# Patient Record
Sex: FEMALE | Race: WHITE | NOT HISPANIC OR LATINO | Employment: OTHER | URBAN - METROPOLITAN AREA
[De-identification: names, ages, dates, MRNs, and addresses within clinical notes are randomized per-mention and may not be internally consistent; named-entity substitution may affect disease eponyms.]

---

## 2018-03-01 ENCOUNTER — AMBULATORY CONVERSION ENCOUNTER (OUTPATIENT)
Dept: CARDIOLOGY | Facility: CLINIC | Age: 63
End: 2018-03-01

## 2018-03-14 ENCOUNTER — TRANSCRIBE ORDERS (OUTPATIENT)
Dept: SCHEDULING | Age: 63
End: 2018-03-14

## 2018-03-14 DIAGNOSIS — E11.9 DIABETES MELLITUS WITHOUT COMPLICATION (CMS/HCC): Primary | ICD-10-CM

## 2018-04-02 ENCOUNTER — HOSPITAL ENCOUNTER (OUTPATIENT)
Dept: RADIOLOGY | Facility: HOSPITAL | Age: 63
Discharge: HOME | End: 2018-04-02
Attending: INTERNAL MEDICINE
Payer: COMMERCIAL

## 2018-04-02 DIAGNOSIS — E11.9 DIABETES MELLITUS WITHOUT COMPLICATION (CMS/HCC): ICD-10-CM

## 2018-04-02 PROCEDURE — 75571 CT HRT W/O DYE W/CA TEST: CPT

## 2018-04-09 ENCOUNTER — TELEPHONE (OUTPATIENT)
Dept: CARDIOLOGY | Facility: CLINIC | Age: 63
End: 2018-04-09

## 2018-04-09 ENCOUNTER — TELEPHONE (OUTPATIENT)
Dept: SCHEDULING | Facility: CLINIC | Age: 63
End: 2018-04-09

## 2018-04-09 NOTE — TELEPHONE ENCOUNTER
Dr. Daniel Pt.  Pt returning Dr. Middleton regarding calcium score results. Pt can be reached at number provided.

## 2018-04-09 NOTE — TELEPHONE ENCOUNTER
Spoke with pt. Calcium score reviewed, elevated evidence of definite CAD. On asa/statin. Continue. Discussed symptoms, call with any concerns. Lifestyle modification.

## 2018-06-11 PROBLEM — M19.90 ARTHRITIS: Status: ACTIVE | Noted: 2018-06-11

## 2018-06-12 ENCOUNTER — OFFICE VISIT (OUTPATIENT)
Dept: FAMILY MEDICINE | Facility: CLINIC | Age: 63
End: 2018-06-12
Attending: FAMILY MEDICINE
Payer: COMMERCIAL

## 2018-06-12 VITALS
OXYGEN SATURATION: 98 % | TEMPERATURE: 97.1 F | HEART RATE: 79 BPM | BODY MASS INDEX: 38.87 KG/M2 | HEIGHT: 60 IN | WEIGHT: 198 LBS

## 2018-06-12 DIAGNOSIS — F41.9 ANXIETY: ICD-10-CM

## 2018-06-12 DIAGNOSIS — E11.69 DM TYPE 2 WITH DIABETIC MIXED HYPERLIPIDEMIA (CMS/HCC)  (CMS/HCC): Primary | ICD-10-CM

## 2018-06-12 DIAGNOSIS — E78.2 DM TYPE 2 WITH DIABETIC MIXED HYPERLIPIDEMIA (CMS/HCC)  (CMS/HCC): Primary | ICD-10-CM

## 2018-06-12 DIAGNOSIS — I10 ESSENTIAL HYPERTENSION: ICD-10-CM

## 2018-06-12 PROCEDURE — 36415 COLL VENOUS BLD VENIPUNCTURE: CPT | Performed by: FAMILY MEDICINE

## 2018-06-12 PROCEDURE — 99213 OFFICE O/P EST LOW 20 MIN: CPT | Mod: 25 | Performed by: FAMILY MEDICINE

## 2018-06-12 RX ORDER — RAMIPRIL 2.5 MG/1
2.5 CAPSULE ORAL DAILY
Qty: 90 CAPSULE | Refills: 1 | Status: SHIPPED | OUTPATIENT
Start: 2018-06-12 | End: 2018-12-18 | Stop reason: SDUPTHER

## 2018-06-12 RX ORDER — NYSTATIN AND TRIAMCINOLONE ACETONIDE 100000; 1 [USP'U]/G; MG/G
1 CREAM TOPICAL 3 TIMES DAILY
COMMUNITY
Start: 2017-06-06 | End: 2018-06-12 | Stop reason: SDUPTHER

## 2018-06-12 RX ORDER — RAMIPRIL 2.5 MG/1
1 CAPSULE ORAL DAILY
COMMUNITY
Start: 2017-12-12 | End: 2018-06-12 | Stop reason: SDUPTHER

## 2018-06-12 RX ORDER — NYSTATIN AND TRIAMCINOLONE ACETONIDE 100000; 1 [USP'U]/G; MG/G
1 CREAM TOPICAL 3 TIMES DAILY
Qty: 30 G | Refills: 1 | Status: SHIPPED | OUTPATIENT
Start: 2018-06-12 | End: 2019-06-24

## 2018-06-12 RX ORDER — ATORVASTATIN CALCIUM 40 MG/1
40 TABLET, FILM COATED ORAL DAILY
Qty: 90 TABLET | Refills: 1 | Status: SHIPPED | OUTPATIENT
Start: 2018-06-12 | End: 2018-12-18 | Stop reason: SDUPTHER

## 2018-06-12 RX ORDER — TRIAMTERENE AND HYDROCHLOROTHIAZIDE 37.5; 25 MG/1; MG/1
1 CAPSULE ORAL DAILY
COMMUNITY
Start: 2017-12-12 | End: 2018-06-12 | Stop reason: SDUPTHER

## 2018-06-12 RX ORDER — ATORVASTATIN CALCIUM 40 MG/1
1 TABLET, FILM COATED ORAL DAILY
COMMUNITY
Start: 2017-12-12 | End: 2018-06-12 | Stop reason: SDUPTHER

## 2018-06-12 RX ORDER — LORAZEPAM 1 MG/1
1 TABLET ORAL 3 TIMES DAILY
COMMUNITY
Start: 2017-12-12 | End: 2024-07-22 | Stop reason: SDUPTHER

## 2018-06-12 RX ORDER — TRIAMTERENE AND HYDROCHLOROTHIAZIDE 37.5; 25 MG/1; MG/1
1 CAPSULE ORAL DAILY
Qty: 90 CAPSULE | Refills: 1 | Status: SHIPPED | OUTPATIENT
Start: 2018-06-12 | End: 2019-06-24 | Stop reason: SDUPTHER

## 2018-06-12 RX ORDER — VIT C/E/ZN/COPPR/LUTEIN/ZEAXAN 250MG-90MG
1 CAPSULE ORAL DAILY
COMMUNITY
Start: 2013-03-11

## 2018-06-12 RX ORDER — METFORMIN HYDROCHLORIDE 500 MG/1
1 TABLET ORAL DAILY
COMMUNITY
Start: 2017-12-12 | End: 2018-06-12 | Stop reason: SDUPTHER

## 2018-06-12 RX ORDER — METFORMIN HYDROCHLORIDE 500 MG/1
500 TABLET ORAL DAILY
Qty: 90 TABLET | Refills: 1 | Status: SHIPPED | OUTPATIENT
Start: 2018-06-12 | End: 2018-12-18 | Stop reason: SDUPTHER

## 2018-06-12 NOTE — PROGRESS NOTES
Subjective     Isabel Spivey is a 62 y.o. female who presents for follow up of diabetes.. Current symptoms include: none. Patient denies foot ulcerations, nausea, paresthesia of the feet, visual disturbances and weight loss. Evaluation to date has been: hemoglobin A1C. Home sugars: patient does not check sugars. Current treatments: Continued metformin which has been somewhat effective. Last dilated eye exam last  Eye exam two mths ago.    The following portions of the patient's history were reviewed and updated as appropriate: allergies, current medications, past family history, past medical history, past social history, past surgical history and problem list..    Review of Systems  Constitutional: negative  Eyes: negative  Ears, nose, mouth, throat, and face: negative  Respiratory: negative  Cardiovascular: negative.    Objective     Pulse 79   Temp 36.2 °C (97.1 °F)   Ht 1.524 m (5')   Wt 89.8 kg (198 lb)   SpO2 98%   BMI 38.67 kg/m²   General appearance: alert, appears stated age and cooperative  Head: normocephalic, without obvious abnormality, atraumatic  Eyes: conjunctivae/corneas clear. PERRL, EOM's intact. Fundi benign.  Ears: normal TM's and external ear canals both ears  Throat: lips, mucosa, and tongue normal; teeth and gums normal  Lungs: clear to auscultation bilaterally  Extremities: extremities normal, warm and well-perfused; no cyanosis, clubbing, or edema  Pulses: 2+ and symmetric      Physical Exam    Patient was not evaluated for proper footwear and sizing.    Laboratory:  Lab Results   Component Value Date    HGBA1C 6.0 (H) 12/13/2017    HGBA1C 6.3 (H) 10/07/2016       Assessment/Plan     Diabetes mellitus Type II, under good control..    Reminded to get yearly retinal exam.  Follow up in 3 months or as needed.     Julianne CANALES, rony scribing for, and in the presence of, Fabian Padron DO.    6/12/2018 8:57 AM  Fabian CANALES DO, personally performed the services  described in this documentation as scribed by Julianne Coates in my presence, and it is both accurate and complete.

## 2018-06-13 LAB
ALBUMIN SERPL-MCNC: 4.3 G/DL (ref 3.6–5.1)
ALBUMIN/GLOB SERPL: 1.5 (CALC) (ref 1–2.5)
ALP SERPL-CCNC: 116 U/L (ref 33–130)
ALT SERPL-CCNC: 17 U/L (ref 6–29)
AST SERPL-CCNC: 16 U/L (ref 10–35)
BASOPHILS # BLD AUTO: 59 CELLS/UL (ref 0–200)
BASOPHILS NFR BLD AUTO: 1 %
BILIRUB SERPL-MCNC: 0.7 MG/DL (ref 0.2–1.2)
BUN SERPL-MCNC: 19 MG/DL (ref 7–25)
BUN/CREAT SERPL: ABNORMAL (CALC) (ref 6–22)
CALCIUM SERPL-MCNC: 9.7 MG/DL (ref 8.6–10.4)
CHLORIDE SERPL-SCNC: 105 MMOL/L (ref 98–110)
CHOLEST SERPL-MCNC: 165 MG/DL
CHOLEST/HDLC SERPL: 3.2 (CALC)
CO2 SERPL-SCNC: 26 MMOL/L (ref 20–31)
CREAT SERPL-MCNC: 0.73 MG/DL (ref 0.5–0.99)
EOSINOPHIL # BLD AUTO: 171 CELLS/UL (ref 15–500)
EOSINOPHIL NFR BLD AUTO: 2.9 %
ERYTHROCYTE [DISTWIDTH] IN BLOOD BY AUTOMATED COUNT: 12.9 % (ref 11–15)
GFR SERPL CREATININE-BSD FRML MDRD: 87 ML/MIN/1.73M2
GLOBULIN SER CALC-MCNC: 2.8 G/DL (CALC) (ref 1.9–3.7)
GLUCOSE SERPL-MCNC: 117 MG/DL (ref 65–99)
HBA1C MFR BLD: 5.9 % OF TOTAL HGB
HCT VFR BLD AUTO: 45.4 % (ref 35–45)
HDLC SERPL-MCNC: 51 MG/DL
HGB BLD-MCNC: 14.9 G/DL (ref 11.7–15.5)
LDLC SERPL CALC-MCNC: 86 MG/DL (CALC)
LYMPHOCYTES # BLD AUTO: 1410 CELLS/UL (ref 850–3900)
LYMPHOCYTES NFR BLD AUTO: 23.9 %
MCH RBC QN AUTO: 29.6 PG (ref 27–33)
MCHC RBC AUTO-ENTMCNC: 32.8 G/DL (ref 32–36)
MCV RBC AUTO: 90.3 FL (ref 80–100)
MONOCYTES # BLD AUTO: 525 CELLS/UL (ref 200–950)
MONOCYTES NFR BLD AUTO: 8.9 %
NEUTROPHILS # BLD AUTO: 3735 CELLS/UL (ref 1500–7800)
NEUTROPHILS NFR BLD AUTO: 63.3 %
NONHDLC SERPL-MCNC: 114 MG/DL (CALC)
PLATELET # BLD AUTO: 236 THOUSAND/UL (ref 140–400)
PMV BLD REES-ECKER: 10 FL (ref 7.5–12.5)
POTASSIUM SERPL-SCNC: 5 MMOL/L (ref 3.5–5.3)
PROT SERPL-MCNC: 7.1 G/DL (ref 6.1–8.1)
RBC # BLD AUTO: 5.03 MILLION/UL (ref 3.8–5.1)
SODIUM SERPL-SCNC: 142 MMOL/L (ref 135–146)
TRIGL SERPL-MCNC: 179 MG/DL
WBC # BLD AUTO: 5.9 THOUSAND/UL (ref 3.8–10.8)

## 2018-09-05 RX ORDER — PENCICLOVIR 10 MG/G
CREAM TOPICAL 4 TIMES DAILY
Qty: 1.5 G | Refills: 1 | Status: SHIPPED | OUTPATIENT
Start: 2018-09-05 | End: 2023-01-17

## 2018-12-18 ENCOUNTER — OFFICE VISIT (OUTPATIENT)
Dept: FAMILY MEDICINE | Facility: CLINIC | Age: 63
End: 2018-12-18
Payer: COMMERCIAL

## 2018-12-18 VITALS
DIASTOLIC BLOOD PRESSURE: 76 MMHG | OXYGEN SATURATION: 96 % | WEIGHT: 196 LBS | TEMPERATURE: 97.8 F | HEIGHT: 60 IN | BODY MASS INDEX: 38.48 KG/M2 | SYSTOLIC BLOOD PRESSURE: 122 MMHG | HEART RATE: 81 BPM

## 2018-12-18 DIAGNOSIS — Z23 NEED FOR VACCINATION WITH 13-POLYVALENT PNEUMOCOCCAL CONJUGATE VACCINE: ICD-10-CM

## 2018-12-18 DIAGNOSIS — E11.69 DM TYPE 2 WITH DIABETIC MIXED HYPERLIPIDEMIA (CMS/HCC)  (CMS/HCC): Primary | ICD-10-CM

## 2018-12-18 DIAGNOSIS — E11.9 DIABETES MELLITUS WITH COINCIDENT HYPERTENSION (CMS/HCC)  (CMS/HCC): ICD-10-CM

## 2018-12-18 DIAGNOSIS — E78.2 DM TYPE 2 WITH DIABETIC MIXED HYPERLIPIDEMIA (CMS/HCC)  (CMS/HCC): Primary | ICD-10-CM

## 2018-12-18 DIAGNOSIS — I10 DIABETES MELLITUS WITH COINCIDENT HYPERTENSION (CMS/HCC)  (CMS/HCC): ICD-10-CM

## 2018-12-18 PROCEDURE — 90670 PCV13 VACCINE IM: CPT | Performed by: FAMILY MEDICINE

## 2018-12-18 PROCEDURE — 99214 OFFICE O/P EST MOD 30 MIN: CPT | Mod: 25 | Performed by: FAMILY MEDICINE

## 2018-12-18 PROCEDURE — 36415 COLL VENOUS BLD VENIPUNCTURE: CPT | Performed by: FAMILY MEDICINE

## 2018-12-18 PROCEDURE — 90471 IMMUNIZATION ADMIN: CPT | Performed by: FAMILY MEDICINE

## 2018-12-18 RX ORDER — RAMIPRIL 2.5 MG/1
2.5 CAPSULE ORAL DAILY
Qty: 90 CAPSULE | Refills: 1 | Status: SHIPPED | OUTPATIENT
Start: 2018-12-18 | End: 2019-12-02 | Stop reason: SDUPTHER

## 2018-12-18 RX ORDER — ATORVASTATIN CALCIUM 40 MG/1
40 TABLET, FILM COATED ORAL DAILY
Qty: 90 TABLET | Refills: 1 | Status: SHIPPED | OUTPATIENT
Start: 2018-12-18 | End: 2019-03-07

## 2018-12-18 RX ORDER — HYDROCORTISONE 25 MG/G
CREAM TOPICAL
COMMUNITY
Start: 2015-03-24 | End: 2019-06-24

## 2018-12-18 RX ORDER — METFORMIN HYDROCHLORIDE 500 MG/1
500 TABLET ORAL DAILY
Qty: 90 TABLET | Refills: 1 | Status: SHIPPED | OUTPATIENT
Start: 2018-12-18 | End: 2019-06-24 | Stop reason: SDUPTHER

## 2018-12-18 ASSESSMENT — ENCOUNTER SYMPTOMS
BLURRED VISION: 0
CHEST TIGHTNESS: 0
HYPERTENSION: 1
FATIGUE: 0
PSYCHIATRIC NEGATIVE: 1
MUSCULOSKELETAL NEGATIVE: 1
GASTROINTESTINAL NEGATIVE: 1
WHEEZING: 0
UNEXPECTED WEIGHT CHANGE: 0
NEUROLOGICAL NEGATIVE: 1
CARDIOVASCULAR NEGATIVE: 1
WEAKNESS: 0
VISUAL CHANGE: 0

## 2018-12-18 NOTE — PROGRESS NOTES
Subjective     Patient ID: Isabel Spivey is a 63 y.o. female.    Diabetes   She presents for her follow-up diabetic visit. She has type 2 diabetes mellitus. Her disease course has been stable. There are no hypoglycemic associated symptoms. Pertinent negatives for diabetes include no blurred vision, no fatigue, no foot ulcerations, no visual change and no weakness. There are no hypoglycemic complications. Symptoms are stable. There are no diabetic complications. Risk factors for coronary artery disease include diabetes mellitus, dyslipidemia, hypertension and obesity. Current diabetic treatment includes oral agent (monotherapy). She is compliant with treatment all of the time. Her weight is stable. She has not had a previous visit with a dietitian. Her home blood glucose trend is fluctuating minimally. She does not see a podiatrist.Eye exam is not current.   Hypertension   Pertinent negatives include no blurred vision.       Review of Systems   Constitutional: Negative for fatigue and unexpected weight change.   HENT: Negative.    Eyes: Negative for blurred vision and visual disturbance.   Respiratory: Negative for chest tightness and wheezing.    Cardiovascular: Negative.    Gastrointestinal: Negative.    Genitourinary: Negative.    Musculoskeletal: Negative.    Skin: Negative.    Neurological: Negative.  Negative for weakness.   Psychiatric/Behavioral: Negative.        Objective     Vitals:    12/18/18 0825   BP: 122/76   BP Location: Left upper arm   Patient Position: Sitting   Pulse: 81   Temp: 36.6 °C (97.8 °F)   TempSrc: Oral   SpO2: 96%   Weight: 88.9 kg (196 lb)   Height: 1.524 m (5')     Body mass index is 38.28 kg/m².    Physical Exam   Constitutional: She is oriented to person, place, and time. She appears well-developed and well-nourished.   HENT:   Head: Normocephalic.   Eyes: Pupils are equal, round, and reactive to light.   Cardiovascular: Normal rate, regular rhythm and normal heart sounds.     Pulmonary/Chest: Effort normal and breath sounds normal.   Musculoskeletal: Normal range of motion.   Neurological: She is alert and oriented to person, place, and time.   Skin: Skin is warm and dry.   Psychiatric: She has a normal mood and affect.   Vitals reviewed.      Assessment/Plan   Problem List Items Addressed This Visit     None      Visit Diagnoses     DM type 2 with diabetic mixed hyperlipidemia (CMS/HCC) ([facility])    -  Primary    Relevant Medications    atorvastatin (LIPITOR) 40 mg tablet    metFORMIN (GLUCOPHAGE) 500 mg tablet    Other Relevant Orders    Comprehensive metabolic panel    Hemoglobin A1c    Diabetes mellitus with coincident hypertension (CMS/HCC)        Relevant Medications    ramipril (ALTACE) 2.5 mg capsule    metFORMIN (GLUCOPHAGE) 500 mg tablet    Other Relevant Orders    Comprehensive metabolic panel    Hemoglobin A1c      Advised to follow up in 3 month , continue with present regimen.     Julianne CANALES, am scribing for, and in the presence of, Fabian Padron DO.    12/18/2018 8:54 AM  Fabian CANALES DO, personally performed the services described in this documentation as scribed by Julianne Coates in my presence, and it is both accurate and complete.

## 2018-12-19 LAB
ALBUMIN SERPL-MCNC: 4.3 G/DL (ref 3.6–5.1)
ALBUMIN/GLOB SERPL: 1.5 (CALC) (ref 1–2.5)
ALP SERPL-CCNC: 106 U/L (ref 33–130)
ALT SERPL-CCNC: 18 U/L (ref 6–29)
AST SERPL-CCNC: 18 U/L (ref 10–35)
BILIRUB SERPL-MCNC: 0.6 MG/DL (ref 0.2–1.2)
BUN SERPL-MCNC: 16 MG/DL (ref 7–25)
BUN/CREAT SERPL: ABNORMAL (CALC) (ref 6–22)
CALCIUM SERPL-MCNC: 9.9 MG/DL (ref 8.6–10.4)
CHLORIDE SERPL-SCNC: 105 MMOL/L (ref 98–110)
CO2 SERPL-SCNC: 25 MMOL/L (ref 20–32)
CREAT SERPL-MCNC: 0.78 MG/DL (ref 0.5–0.99)
GFR SERPL CREATININE-BSD FRML MDRD: 80 ML/MIN/1.73M2
GLOBULIN SER CALC-MCNC: 2.8 G/DL (CALC) (ref 1.9–3.7)
GLUCOSE SERPL-MCNC: 120 MG/DL (ref 65–99)
HBA1C MFR BLD: 6.2 % OF TOTAL HGB
POTASSIUM SERPL-SCNC: 4.8 MMOL/L (ref 3.5–5.3)
PROT SERPL-MCNC: 7.1 G/DL (ref 6.1–8.1)
SODIUM SERPL-SCNC: 139 MMOL/L (ref 135–146)

## 2019-02-26 ENCOUNTER — OFFICE VISIT (OUTPATIENT)
Dept: FAMILY MEDICINE | Facility: CLINIC | Age: 64
End: 2019-02-26
Payer: COMMERCIAL

## 2019-02-26 VITALS
HEIGHT: 60 IN | WEIGHT: 200 LBS | OXYGEN SATURATION: 95 % | HEART RATE: 97 BPM | BODY MASS INDEX: 39.27 KG/M2 | TEMPERATURE: 98.1 F | SYSTOLIC BLOOD PRESSURE: 122 MMHG | DIASTOLIC BLOOD PRESSURE: 72 MMHG

## 2019-02-26 DIAGNOSIS — L30.9 DERMATITIS: Primary | ICD-10-CM

## 2019-02-26 PROCEDURE — 99214 OFFICE O/P EST MOD 30 MIN: CPT | Performed by: FAMILY MEDICINE

## 2019-02-26 RX ORDER — HYDROCORTISONE 25 MG/G
CREAM TOPICAL 2 TIMES DAILY
Qty: 30 G | Refills: 1 | Status: SHIPPED | OUTPATIENT
Start: 2019-02-26 | End: 2019-03-18 | Stop reason: SDUPTHER

## 2019-02-26 RX ORDER — METHYLPREDNISOLONE 4 MG/1
TABLET ORAL
Qty: 21 TABLET | Refills: 0 | Status: SHIPPED | OUTPATIENT
Start: 2019-02-26 | End: 2019-03-05

## 2019-02-26 ASSESSMENT — ENCOUNTER SYMPTOMS
SORE THROAT: 0
FEVER: 0
SHORTNESS OF BREATH: 0
FATIGUE: 0

## 2019-02-26 NOTE — PROGRESS NOTES
Subjective     Patient ID: Isabel Spivey is a 63 y.o. female.    Rash   This is a new problem. Episode onset: in the past 3 days  The problem is unchanged. The affected locations include the abdomen. The rash is characterized by itchiness and redness. It is unknown if there was an exposure to a precipitant. Pertinent negatives include no facial edema, fatigue, fever, shortness of breath or sore throat. Past treatments include nothing. There is no history of eczema.       Review of Systems   Constitutional: Negative for fatigue and fever.   HENT: Negative for sore throat.    Respiratory: Negative for shortness of breath.    Skin: Positive for rash.   All other systems reviewed and are negative.      Objective     Vitals:    02/26/19 1006   BP: 122/72   BP Location: Left upper arm   Patient Position: Sitting   Pulse: 97   Temp: 36.7 °C (98.1 °F)   TempSrc: Tympanic   SpO2: 95%   Weight: 90.7 kg (200 lb)   Height: 1.524 m (5')     Body mass index is 39.06 kg/m².    Physical Exam   Constitutional: She is oriented to person, place, and time. She appears well-developed and well-nourished.   HENT:   Head: Normocephalic.   Eyes: EOM are normal. Pupils are equal, round, and reactive to light.   Cardiovascular: Normal rate, regular rhythm and normal heart sounds.    Pulmonary/Chest: Effort normal and breath sounds normal.   Musculoskeletal: Normal range of motion.   Neurological: She is alert and oriented to person, place, and time.   Skin: Skin is warm and dry. Rash noted. There is erythema. No pallor.   Psychiatric: She has a normal mood and affect.   Vitals reviewed.      Assessment/Plan   Diagnoses and all orders for this visit:    Dermatitis (Primary)    Other orders  -     methylPREDNISolone (MEDROL DOSEPACK) 4 mg tablet; Follow package directions.  -     hydrocortisone 2.5 % cream; Apply topically 2 (two) times a day.        Advised to follow up if condition worsened or fails to improve with treatment.     Julianne CANALES  Susana Coates, am scribing for, and in the presence of, Fabian Padron DO.    2/26/2019 10:25 AM  I, Fabian Padron DO, personally performed the services described in this documentation as scribed by Julianne Coates in my presence, and it is both accurate and complete.

## 2019-03-07 ENCOUNTER — OFFICE VISIT (OUTPATIENT)
Dept: CARDIOLOGY | Facility: CLINIC | Age: 64
End: 2019-03-07
Payer: COMMERCIAL

## 2019-03-07 VITALS
SYSTOLIC BLOOD PRESSURE: 124 MMHG | BODY MASS INDEX: 38.87 KG/M2 | HEIGHT: 60 IN | WEIGHT: 198 LBS | DIASTOLIC BLOOD PRESSURE: 82 MMHG

## 2019-03-07 DIAGNOSIS — E78.2 MIXED HYPERLIPIDEMIA: ICD-10-CM

## 2019-03-07 DIAGNOSIS — I10 ESSENTIAL HYPERTENSION: ICD-10-CM

## 2019-03-07 DIAGNOSIS — I25.10 CORONARY ARTERY DISEASE INVOLVING NATIVE CORONARY ARTERY OF NATIVE HEART WITHOUT ANGINA PECTORIS: Primary | ICD-10-CM

## 2019-03-07 DIAGNOSIS — E66.01 CLASS 2 SEVERE OBESITY DUE TO EXCESS CALORIES WITH SERIOUS COMORBIDITY AND BODY MASS INDEX (BMI) OF 38.0 TO 38.9 IN ADULT (CMS/HCC): ICD-10-CM

## 2019-03-07 DIAGNOSIS — E66.812 CLASS 2 SEVERE OBESITY DUE TO EXCESS CALORIES WITH SERIOUS COMORBIDITY AND BODY MASS INDEX (BMI) OF 38.0 TO 38.9 IN ADULT (CMS/HCC): ICD-10-CM

## 2019-03-07 DIAGNOSIS — E11.8 TYPE 2 DIABETES MELLITUS WITH COMPLICATION, WITHOUT LONG-TERM CURRENT USE OF INSULIN (CMS/HCC): ICD-10-CM

## 2019-03-07 PROCEDURE — 93000 ELECTROCARDIOGRAM COMPLETE: CPT | Performed by: INTERNAL MEDICINE

## 2019-03-07 PROCEDURE — 99214 OFFICE O/P EST MOD 30 MIN: CPT | Performed by: INTERNAL MEDICINE

## 2019-03-07 RX ORDER — ASPIRIN 81 MG/1
81 TABLET ORAL DAILY
COMMUNITY

## 2019-03-07 RX ORDER — ATORVASTATIN CALCIUM 40 MG/1
60 TABLET, FILM COATED ORAL DAILY
Qty: 135 TABLET | Refills: 3 | COMMUNITY
Start: 2019-03-07 | End: 2019-06-24 | Stop reason: SDUPTHER

## 2019-03-07 ASSESSMENT — ENCOUNTER SYMPTOMS
ORTHOPNEA: 0
VOMITING: 0
LEFT EYE: 0
DEPRESSION: 0
WEIGHT GAIN: 0
SHORTNESS OF BREATH: 0
MYALGIAS: 0
PALPITATIONS: 0
SYNCOPE: 0
ABDOMINAL PAIN: 0
NEAR-SYNCOPE: 0
HEMATURIA: 0
LIGHT-HEADEDNESS: 0
BRUISES/BLEEDS EASILY: 0
HEMATOCHEZIA: 0
PND: 0
IRREGULAR HEARTBEAT: 0
ALTERED MENTAL STATUS: 0
DYSPNEA ON EXERTION: 0
WHEEZING: 0
DIZZINESS: 0
COUGH: 0
SNORING: 0
RIGHT EYE: 0
NAUSEA: 0

## 2019-03-07 NOTE — ASSESSMENT & PLAN NOTE
BMI 38.67.  --Lifestyle modification discussed, weight loss encouraged. Reviewed the cardiovascular benefits of a heart healthy diet and regular aerobic exercise.   --Consider sleep study.

## 2019-03-07 NOTE — ASSESSMENT & PLAN NOTE
From 6/12/18: Total cholesterol 165, triglycerides 179, HDL 51, LDL 86.  Comorbidities: Coronary atherosclerosis and diabetes mellitus.  --Recommend increasing atorvastatin to 60 mg daily.  --Repeat fasting lipids and CMP in 3 months.  --Goal LDL less than 70 given comorbidities.  --Lifestyle modification discussed at length.

## 2019-03-07 NOTE — ASSESSMENT & PLAN NOTE
From 12/18/18: Hemoglobin A1c 6.2%, adequately controlled with oral therapy.  --Management as per Dr. Padron.

## 2019-03-07 NOTE — ASSESSMENT & PLAN NOTE
Blood pressure acceptable, measuring 128/82 mmHg on my manual reading.   --Continue current regimen: Ramipril 2.5 mg daily and triamterene-HCTZ.   --Goal blood pressure less than 130/80 mmHg.

## 2019-03-07 NOTE — ASSESSMENT & PLAN NOTE
Coronary calcium score 4/2/18: Left main 0.  .  .  LCx 169. Composite 562 is between the 95 and 100 percentile for females her age.  Asymptomatic.  --Diagnosis discussed at length using illustration.  --Continue aspirin and high intensity statin.  --Lifestyle modification discussed at length, particularly recommending improve dietary habits, increase regular aerobic activity, and weight loss.

## 2019-03-07 NOTE — PROGRESS NOTES
Elaina Daniel MD, [facility]  Cardiology    [facility]    [facility]  [address]  [address] [place] [address]    TEL  [phone]  [facility]     2019    Reason for visit: Cardiovascular follow-up.    Isabel Spivey is a 63 y.o. female who presents for cardiovascular follow-up.  Isabel has a past medical history of hypertension, hyperlipidemia, and diabetes mellitus.  I met her in initial cardiovascular consultation on 2018 for a preventative cardiovascular visit.  I recommended a coronary calcium score given her risk factor profile, which was subsequently performed on 2018 and returned elevated with a composite of 562.  We reviewed these findings over the phone.    Today, Isabel returns for follow-up and reports feeling well from a cardiovascular standpoint.  She is quite sedentary.  She denies chest pain, shortness of breath, orthopnea, PND, lower extremity edema, palpitations, dizziness, lightens, or syncope.    She is scheduled for a [place] cruise in October and hopes to become more active in anticipation of this, although remains sedentary at this time.    Past Medical History:  1. Coronary artery disease   2. Hypertension  3. Hyperlipidemia  4. Diabetes mellitus  5. Osteoarthritis  6. Obstetric history: , no complications    Past Surgical History:  1. Ovarian cysts resection: Right,   2. Carpal tunnel repair: Bilateral  3. Hysterectomy:   4. Colon resection secondary to complication of hysterectomy:   5. Total knee arthroplasty: Right,   6. Trigger finger release    Medications: Aspirin 81 mg daily, atorvastatin 40 mg daily, metformin 500 mg twice daily, ramipril 2.5 mg daily, triamterene-HCTZ 37.5-25 mg daily, fish oil.    Allergies: No known drug allergies    Social History: , Michael. 2 adult children. Former smoker, 1 pack per month, quit . Social alcohol use.  Denies illicit drug use.    Family History: Reviewed and notable for unknown heart disease in her mother, who ultimately  of metastatic cancer at age 85.    Review of Systems   Constitution: Negative for malaise/fatigue and weight gain.   HENT: Negative for nosebleeds.    Eyes: Negative for vision loss in left eye, vision loss in right eye and visual disturbance.   Cardiovascular: Negative for chest pain, cyanosis, dyspnea on exertion, irregular heartbeat, leg swelling, near-syncope, orthopnea, palpitations, paroxysmal nocturnal dyspnea and syncope.   Respiratory: Negative for cough, shortness of breath, snoring and wheezing.    Endocrine: Negative for polyuria.   Hematologic/Lymphatic: Negative for bleeding problem. Does not bruise/bleed easily.   Skin: Negative for rash.   Musculoskeletal: Negative for arthritis and myalgias.   Gastrointestinal: Negative for abdominal pain, hematochezia, melena, nausea and vomiting.   Genitourinary: Negative for hematuria.   Neurological: Negative for dizziness and light-headedness.   Psychiatric/Behavioral: Negative for altered mental status and depression.       Exam:  Objective   Vitals:    19 1004   BP: 124/82     Body mass index is 38.67 kg/m².  Physical Exam   Constitutional: She appears well-developed and well-nourished. No distress.   HENT:   Head: Normocephalic.   Mouth/Throat: Mucous membranes are normal. Mucous membranes are not cyanotic.   Eyes: Conjunctivae are normal.   Neck: No JVD present. Carotid bruit is not present.   Cardiovascular: Normal rate, regular rhythm, S1 normal and S2 normal.   No extrasystoles are present. Exam reveals no gallop.    No murmur heard.  Pulses:       Carotid pulses are 2+ on the right side, and 2+ on the left side.       Radial pulses are 2+ on the right side, and 2+ on the left side.   Pulmonary/Chest: Effort normal. No respiratory distress. She has no wheezes. She has no rales.   Abdominal: Soft. Bowel sounds are normal.  "There is no tenderness.   Musculoskeletal: She exhibits no edema.   Lymphadenopathy:     She has no cervical adenopathy.   Neurological: She is alert.   Skin: Skin is warm and dry. No cyanosis.   Psychiatric: She has a normal mood and affect. Her speech is normal.       Labs:  Lab Results   Component Value Date    WBC 5.9 06/12/2018    HGB 14.9 06/12/2018     06/12/2018    CHOL 165 06/12/2018    TRIG 179 (H) 06/12/2018    HDL 51 06/12/2018    ALT 18 12/18/2018    AST 18 12/18/2018     12/18/2018    K 4.8 12/18/2018    CREATININE 0.78 12/18/2018    TSH 1.40 12/05/2016    HGBA1C 6.2 (H) 12/18/2018    MICROALBUR 1.0 12/05/2016     Personally reviewed, my interpretation: LDL 86.  Acceptable glucose control given diabetes.  Normal renal and liver function.    Cardiovascular Studies:   1.  Coronary calcium score 4/2/18: Composite 562.  Left main 0.  .  .  LCx 169. Composite 562 is between the 95 and 100 percentile for females between the ages of 55 and 64, as per [employer] 2006 Database. This correlates with \"extensive atherosclerotic plaque\" with \"high likelihood of at least one significant coronary narrowing\".    ECG from today personally reviewed and discussed with the patient shows sinus rhythm, low voltage.    Assessment/Plan   Problem List Items Addressed This Visit     Diabetes mellitus (CMS/HCC) (HCC)     From 12/18/18: Hemoglobin A1c 6.2%, adequately controlled with oral therapy.  --Management as per Dr. Padron.         Relevant Orders    ECG 12 lead (Completed)    Hypertension     Blood pressure acceptable, measuring 128/82 mmHg on my manual reading.   --Continue current regimen: Ramipril 2.5 mg daily and triamterene-HCTZ.   --Goal blood pressure less than 130/80 mmHg.         Relevant Orders    ECG 12 lead (Completed)    Hyperlipidemia     From 6/12/18: Total cholesterol 165, triglycerides 179, HDL 51, LDL 86.  Comorbidities: Coronary atherosclerosis and diabetes mellitus.  --Recommend " increasing atorvastatin to 60 mg daily.  --Repeat fasting lipids and CMP in 3 months.  --Goal LDL less than 70 given comorbidities.  --Lifestyle modification discussed at length.         Relevant Medications    atorvastatin (LIPITOR) 40 mg tablet    Other Relevant Orders    ECG 12 lead (Completed)    Lipid panel    Comprehensive metabolic panel    Coronary artery disease involving native coronary artery of native heart without angina pectoris - Primary     Coronary calcium score 4/2/18: Left main 0.  .  .  LCx 169. Composite 562 is between the 95 and 100 percentile for females her age.  Asymptomatic.  --Diagnosis discussed at length using illustration.  --Continue aspirin and high intensity statin.  --Lifestyle modification discussed at length, particularly recommending improve dietary habits, increase regular aerobic activity, and weight loss.         Relevant Medications    aspirin 81 mg enteric coated tablet    atorvastatin (LIPITOR) 40 mg tablet    Other Relevant Orders    ECG 12 lead (Completed)    Class 2 severe obesity due to excess calories with serious comorbidity and body mass index (BMI) of 38.0 to 38.9 in adult (CMS/[facility])     BMI 38.67.  --Lifestyle modification discussed, weight loss encouraged. Reviewed the cardiovascular benefits of a heart healthy diet and regular aerobic exercise.   --Consider sleep study.                  This letter was generated using speech recognition software. Please excuse any typographical errors.    Return in about 1 year (around 3/7/2020).        Elaina Daniel MD, [facility]

## 2019-03-07 NOTE — LETTER
2019     Fabian Padron DO  [address] [address]    Patient: Isabel Spivey   YOB: 1955   Date of Visit: 3/7/2019       Dear Dr. Padron:    Thank you for referring Isabel Spivey to me for evaluation. Below are my notes for this consultation.    If you have questions, please do not hesitate to call me. I look forward to following your patient along with you.         Sincerely,        Elaina Daniel MD        CC: No Recipients  Elaina Daniel MD  3/7/2019 11:05 AM  Sign at close encounter   Elaina Daniel MD, [facility]  Cardiology    [facility]  [facility]  [address] [place] [address]    TEL  [phone]  [facility].[employer]hc     2019    Reason for visit: Cardiovascular follow-up.    Isabel Spivey is a 63 y.o. female who presents for cardiovascular follow-up.  Isabel has a past medical history of hypertension, hyperlipidemia, and diabetes mellitus.  I met her in initial cardiovascular consultation on 2018 for a preventative cardiovascular visit.  I recommended a coronary calcium score given her risk factor profile, which was subsequently performed on 2018 and returned elevated with a composite of 562.  We reviewed these findings over the phone.    Today, Isabel returns for follow-up and reports feeling well from a cardiovascular standpoint.  She is quite sedentary.  She denies chest pain, shortness of breath, orthopnea, PND, lower extremity edema, palpitations, dizziness, lightens, or syncope.    She is scheduled for a [place] cruise in October and hopes to become more active in anticipation of this, although remains sedentary at this time.    Past Medical History:  1. Coronary artery disease   2. Hypertension  3. Hyperlipidemia  4. Diabetes mellitus  5. Osteoarthritis  6. Obstetric history: , no complications    Past Surgical  History:  1. Ovarian cysts resection: Right,   2. Carpal tunnel repair: Bilateral  3. Hysterectomy:   4. Colon resection secondary to complication of hysterectomy:   5. Total knee arthroplasty: Right,   6. Trigger finger release    Medications: Aspirin 81 mg daily, atorvastatin 40 mg daily, metformin 500 mg twice daily, ramipril 2.5 mg daily, triamterene-HCTZ 37.5-25 mg daily, fish oil.    Allergies: No known drug allergies    Social History: , Michael. 2 adult children. Former smoker, 1 pack per month, quit . Social alcohol use. Denies illicit drug use.    Family History: Reviewed and notable for unknown heart disease in her mother, who ultimately  of metastatic cancer at age 85.    Review of Systems   Constitution: Negative for malaise/fatigue and weight gain.   HENT: Negative for nosebleeds.    Eyes: Negative for vision loss in left eye, vision loss in right eye and visual disturbance.   Cardiovascular: Negative for chest pain, cyanosis, dyspnea on exertion, irregular heartbeat, leg swelling, near-syncope, orthopnea, palpitations, paroxysmal nocturnal dyspnea and syncope.   Respiratory: Negative for cough, shortness of breath, snoring and wheezing.    Endocrine: Negative for polyuria.   Hematologic/Lymphatic: Negative for bleeding problem. Does not bruise/bleed easily.   Skin: Negative for rash.   Musculoskeletal: Negative for arthritis and myalgias.   Gastrointestinal: Negative for abdominal pain, hematochezia, melena, nausea and vomiting.   Genitourinary: Negative for hematuria.   Neurological: Negative for dizziness and light-headedness.   Psychiatric/Behavioral: Negative for altered mental status and depression.       Exam:  Objective   Vitals:    19 1004   BP: 124/82     Body mass index is 38.67 kg/m².  Physical Exam   Constitutional: She appears well-developed and well-nourished. No distress.   HENT:   Head: Normocephalic.   Mouth/Throat: Mucous membranes are normal. Mucous  "membranes are not cyanotic.   Eyes: Conjunctivae are normal.   Neck: No JVD present. Carotid bruit is not present.   Cardiovascular: Normal rate, regular rhythm, S1 normal and S2 normal.   No extrasystoles are present. Exam reveals no gallop.    No murmur heard.  Pulses:       Carotid pulses are 2+ on the right side, and 2+ on the left side.       Radial pulses are 2+ on the right side, and 2+ on the left side.   Pulmonary/Chest: Effort normal. No respiratory distress. She has no wheezes. She has no rales.   Abdominal: Soft. Bowel sounds are normal. There is no tenderness.   Musculoskeletal: She exhibits no edema.   Lymphadenopathy:     She has no cervical adenopathy.   Neurological: She is alert.   Skin: Skin is warm and dry. No cyanosis.   Psychiatric: She has a normal mood and affect. Her speech is normal.       Labs:  Lab Results   Component Value Date    WBC 5.9 06/12/2018    HGB 14.9 06/12/2018     06/12/2018    CHOL 165 06/12/2018    TRIG 179 (H) 06/12/2018    HDL 51 06/12/2018    ALT 18 12/18/2018    AST 18 12/18/2018     12/18/2018    K 4.8 12/18/2018    CREATININE 0.78 12/18/2018    TSH 1.40 12/05/2016    HGBA1C 6.2 (H) 12/18/2018    MICROALBUR 1.0 12/05/2016     Personally reviewed, my interpretation: LDL 86.  Acceptable glucose control given diabetes.  Normal renal and liver function.    Cardiovascular Studies:   1.  Coronary calcium score 4/2/18: Composite 562.  Left main 0.  .  .  LCx 169. Composite 562 is between the 95 and 100 percentile for females between the ages of 55 and 64, as per [employer] 2006 Database. This correlates with \"extensive atherosclerotic plaque\" with \"high likelihood of at least one significant coronary narrowing\".    ECG from today personally reviewed and discussed with the patient shows sinus rhythm, low voltage.    Assessment/Plan   Problem List Items Addressed This Visit     Diabetes mellitus (CMS/HCC) ([facility])     From 12/18/18: Hemoglobin A1c 6.2%, " adequately controlled with oral therapy.  --Management as per Dr. Padron.         Relevant Orders    ECG 12 lead (Completed)    Hypertension     Blood pressure acceptable, measuring 128/82 mmHg on my manual reading.   --Continue current regimen: Ramipril 2.5 mg daily and triamterene-HCTZ.   --Goal blood pressure less than 130/80 mmHg.         Relevant Orders    ECG 12 lead (Completed)    Hyperlipidemia     From 6/12/18: Total cholesterol 165, triglycerides 179, HDL 51, LDL 86.  Comorbidities: Coronary atherosclerosis and diabetes mellitus.  --Recommend increasing atorvastatin to 60 mg daily.  --Repeat fasting lipids and CMP in 3 months.  --Goal LDL less than 70 given comorbidities.  --Lifestyle modification discussed at length.         Relevant Medications    atorvastatin (LIPITOR) 40 mg tablet    Other Relevant Orders    ECG 12 lead (Completed)    Lipid panel    Comprehensive metabolic panel    Coronary artery disease involving native coronary artery of native heart without angina pectoris - Primary     Coronary calcium score 4/2/18: Left main 0.  .  .  LCx 169. Composite 562 is between the 95 and 100 percentile for females her age.  Asymptomatic.  --Diagnosis discussed at length using illustration.  --Continue aspirin and high intensity statin.  --Lifestyle modification discussed at length, particularly recommending improve dietary habits, increase regular aerobic activity, and weight loss.         Relevant Medications    aspirin 81 mg enteric coated tablet    atorvastatin (LIPITOR) 40 mg tablet    Other Relevant Orders    ECG 12 lead (Completed)    Class 2 severe obesity due to excess calories with serious comorbidity and body mass index (BMI) of 38.0 to 38.9 in adult (CMS/[facility])     BMI 38.67.  --Lifestyle modification discussed, weight loss encouraged. Reviewed the cardiovascular benefits of a heart healthy diet and regular aerobic exercise.   --Consider sleep study.                  This letter  was generated using speech recognition software. Please excuse any typographical errors.    Return in about 1 year (around 3/7/2020).        Elaina Daniel MD, FACC

## 2019-03-18 RX ORDER — HYDROCORTISONE 25 MG/G
CREAM TOPICAL 2 TIMES DAILY
Qty: 30 G | Refills: 1 | Status: SHIPPED | OUTPATIENT
Start: 2019-03-18 | End: 2020-01-06

## 2019-06-24 ENCOUNTER — OFFICE VISIT (OUTPATIENT)
Dept: FAMILY MEDICINE | Facility: CLINIC | Age: 64
End: 2019-06-24
Payer: COMMERCIAL

## 2019-06-24 VITALS
OXYGEN SATURATION: 98 % | SYSTOLIC BLOOD PRESSURE: 120 MMHG | WEIGHT: 197 LBS | RESPIRATION RATE: 16 BRPM | TEMPERATURE: 96.8 F | HEIGHT: 60 IN | DIASTOLIC BLOOD PRESSURE: 80 MMHG | HEART RATE: 85 BPM | BODY MASS INDEX: 38.68 KG/M2

## 2019-06-24 DIAGNOSIS — E11.9 DIABETES MELLITUS WITH COINCIDENT HYPERTENSION (CMS/HCC)  (CMS/HCC): ICD-10-CM

## 2019-06-24 DIAGNOSIS — I10 DIABETES MELLITUS WITH COINCIDENT HYPERTENSION (CMS/HCC)  (CMS/HCC): ICD-10-CM

## 2019-06-24 DIAGNOSIS — E11.69 DM TYPE 2 WITH DIABETIC MIXED HYPERLIPIDEMIA (CMS/HCC)  (CMS/HCC): Primary | ICD-10-CM

## 2019-06-24 DIAGNOSIS — Z12.11 ENCOUNTER FOR FIT (FECAL IMMUNOCHEMICAL TEST) SCREENING: ICD-10-CM

## 2019-06-24 DIAGNOSIS — E78.2 DM TYPE 2 WITH DIABETIC MIXED HYPERLIPIDEMIA (CMS/HCC)  (CMS/HCC): Primary | ICD-10-CM

## 2019-06-24 PROBLEM — E75.6 LIPIDOSIS: Status: ACTIVE | Noted: 2019-06-24

## 2019-06-24 PROCEDURE — 99214 OFFICE O/P EST MOD 30 MIN: CPT | Performed by: FAMILY MEDICINE

## 2019-06-24 RX ORDER — ATORVASTATIN CALCIUM 20 MG/1
20 TABLET, FILM COATED ORAL DAILY
Qty: 90 TABLET | Refills: 1 | Status: SHIPPED | OUTPATIENT
Start: 2019-06-24 | End: 2020-07-30 | Stop reason: SDUPTHER

## 2019-06-24 RX ORDER — ATORVASTATIN CALCIUM 20 MG/1
20 TABLET, FILM COATED ORAL DAILY
Qty: 90 TABLET | Refills: 1 | Status: SHIPPED | OUTPATIENT
Start: 2019-06-24 | End: 2021-01-29 | Stop reason: ENTERED-IN-ERROR

## 2019-06-24 RX ORDER — METFORMIN HYDROCHLORIDE 500 MG/1
500 TABLET ORAL DAILY
Qty: 90 TABLET | Refills: 1 | Status: SHIPPED | OUTPATIENT
Start: 2019-06-24 | End: 2021-01-29 | Stop reason: ENTERED-IN-ERROR

## 2019-06-24 RX ORDER — ATORVASTATIN CALCIUM 40 MG/1
40 TABLET, FILM COATED ORAL DAILY
Qty: 90 TABLET | Refills: 3 | Status: SHIPPED | OUTPATIENT
Start: 2019-06-24 | End: 2019-06-24 | Stop reason: SDUPTHER

## 2019-06-24 RX ORDER — TRIAMTERENE AND HYDROCHLOROTHIAZIDE 37.5; 25 MG/1; MG/1
1 CAPSULE ORAL DAILY
Qty: 90 CAPSULE | Refills: 1 | Status: SHIPPED | OUTPATIENT
Start: 2019-06-24 | End: 2021-01-29 | Stop reason: ENTERED-IN-ERROR

## 2019-06-24 RX ORDER — TRIAMTERENE AND HYDROCHLOROTHIAZIDE 37.5; 25 MG/1; MG/1
1 CAPSULE ORAL DAILY
Qty: 90 CAPSULE | Refills: 1 | Status: SHIPPED | OUTPATIENT
Start: 2019-06-24 | End: 2019-06-24 | Stop reason: SDUPTHER

## 2019-06-24 NOTE — PROGRESS NOTES
Subjective     Isabel Spivey is a 63 y.o. female who presents for follow up of diabetes.. Current symptoms include: none. Patient denies foot ulcerations, nausea, visual disturbances and weight loss. Evaluation to date has been: hemoglobin A1C. Home sugars: patient does not check sugars. Current treatments: Continued metformin which has been effective and Continued statin which has been effective. Last dilated eye exam patient reports within the past 6 months will request report.    The following portions of the patient's history were reviewed and updated as appropriate: allergies, current medications, past family history, past medical history, past social history, past surgical history and problem list..    Review of Systems  Constitutional: negative  Eyes: negative  Ears, nose, mouth, throat, and face: negative  Cardiovascular: negative  Musculoskeletal:negative.    Objective     /80 (BP Location: Left upper arm, Patient Position: Sitting)   Pulse 85   Temp (!) 36 °C (96.8 °F) (Tympanic)   Resp 16   Ht 1.524 m (5')   Wt 89.4 kg (197 lb)   SpO2 98%   BMI 38.47 kg/m²   General appearance: alert, appears stated age and cooperative  Head: normocephalic, without obvious abnormality, atraumatic  Eyes: conjunctivae/corneas clear. PERRL, EOM's intact. Fundi benign.  Ears: normal TM's and external ear canals both ears  Throat: lips, mucosa, and tongue normal; teeth and gums normal  Lungs: clear to auscultation bilaterally  Extremities: extremities normal, warm and well-perfused; no cyanosis, clubbing, or edema  Pulses: 2+ and symmetric      Physical Exam    Patient was not evaluated for proper footwear and sizing.    Laboratory:  Lab Results   Component Value Date    HGBA1C 6.2 (H) 12/18/2018    HGBA1C 6.3 (H) 10/07/2016       Assessment/Plan     Diabetes mellitus Type II, under adequate control..    Follow up in 3 months or as needed.     Julianne CANALES, am scribing for, and in the presence of, Fabian  STAN Padron DO.    6/24/2019 8:47 AM  IFabian DO, personally performed the services described in this documentation as scribed by Julianne Coates in my presence, and it is both accurate and complete.

## 2019-06-25 LAB — HBA1C MFR BLD: 6.1 % OF TOTAL HGB

## 2019-09-10 ENCOUNTER — TELEPHONE (OUTPATIENT)
Dept: FAMILY MEDICINE | Facility: CLINIC | Age: 64
End: 2019-09-10

## 2019-09-10 NOTE — TELEPHONE ENCOUNTER
Patient has been having chronic constipation since cardiologist changed Lipitor to 60 MG daily    Please call [phone] to see if there's anything we can do

## 2019-09-17 ENCOUNTER — OFFICE VISIT (OUTPATIENT)
Dept: FAMILY MEDICINE | Facility: CLINIC | Age: 64
End: 2019-09-17
Payer: COMMERCIAL

## 2019-09-17 VITALS
SYSTOLIC BLOOD PRESSURE: 120 MMHG | RESPIRATION RATE: 16 BRPM | TEMPERATURE: 98.3 F | BODY MASS INDEX: 37.89 KG/M2 | HEIGHT: 60 IN | HEART RATE: 96 BPM | OXYGEN SATURATION: 98 % | DIASTOLIC BLOOD PRESSURE: 72 MMHG | WEIGHT: 193 LBS

## 2019-09-17 DIAGNOSIS — Z96.659 STATUS POST REVISION OF TOTAL KNEE REPLACEMENT, UNSPECIFIED LATERALITY: Primary | ICD-10-CM

## 2019-09-17 DIAGNOSIS — J06.9 ACUTE URI: ICD-10-CM

## 2019-09-17 DIAGNOSIS — I10 ESSENTIAL HYPERTENSION: ICD-10-CM

## 2019-09-17 PROCEDURE — 99213 OFFICE O/P EST LOW 20 MIN: CPT | Performed by: FAMILY MEDICINE

## 2019-09-17 RX ORDER — AMOXICILLIN 500 MG/1
500 CAPSULE ORAL 3 TIMES DAILY
Qty: 30 CAPSULE | Refills: 1 | Status: SHIPPED | OUTPATIENT
Start: 2019-09-17 | End: 2019-09-27

## 2019-09-17 ASSESSMENT — ENCOUNTER SYMPTOMS
SINUS PAIN: 0
SORE THROAT: 1
RHINORRHEA: 1
FEVER: 0
CHILLS: 0
SINUS PRESSURE: 0
DIAPHORESIS: 0
COUGH: 1

## 2019-09-17 NOTE — PROGRESS NOTES
Subjective      Patient ID: Isabel Spivey is a 64 y.o. female.    Sore throat left   Mucous  Cough, when lay down      Going to [place] in [place] Sunday     started Friday      No systemic    No sick contacts      Clear pnd  Mild erythema   No exudate              The following have been reviewed and updated as appropriate in this visit:         Past Medical History: She  has a past medical history of Arthritis; Hypertension; Lipid disorder; and Type 2 diabetes mellitus (CMS/HCC) (HCC).  Past Surgical History: She  has a past surgical history that includes Oophorectomy (Right); Carpal tunnel release (Bilateral); Trigger finger release (Right); Colon surgery; Joint replacement (Right); and Hysterectomy.    Current Outpatient Prescriptions:   •  aspirin 81 mg enteric coated tablet, Take 81 mg by mouth daily., Disp: , Rfl:   •  atorvastatin (LIPITOR) 20 mg tablet, Take 1 tablet (20 mg total) by mouth daily., Disp: 90 tablet, Rfl: 1  •  atorvastatin (LIPITOR) 20 mg tablet, Take 1 tablet (20 mg total) by mouth daily., Disp: 90 tablet, Rfl: 1  •  hydrocortisone 2.5 % cream, Apply topically 2 (two) times a day., Disp: 30 g, Rfl: 1  •  LORazepam (ATIVAN) 1 mg tablet, Take 1 tablet by mouth 3 (three) times a day., Disp: , Rfl:   •  metFORMIN (GLUCOPHAGE) 500 mg tablet, Take 1 tablet (500 mg total) by mouth daily., Disp: 90 tablet, Rfl: 1  •  omega-3 fatty acids-fish oil (omega-3) 360-1,200 mg capsule, Take 1 capsule by mouth daily., Disp: , Rfl:   •  penciclovir (DENAVIR) 1 % cream, Apply topically 4 (four) times a day., Disp: 1.5 g, Rfl: 1  •  triamterene-hydrochlorothiazid (DYAZIDE) 37.5-25 mg per capsule, Take 1 capsule by mouth daily., Disp: 90 capsule, Rfl: 1  Allergies: She is allergic to no known drug allergies.  Social History: She  reports that she quit smoking about 9 years ago. She has never used smokeless tobacco. She reports that she drinks alcohol. She reports that she does not use drugs.    Review of  Systems:  Review of Systems   Constitutional: Negative for chills, diaphoresis and fever.   HENT: Positive for ear pain, postnasal drip, rhinorrhea and sore throat. Negative for ear discharge, sinus pain and sinus pressure.    Respiratory: Positive for cough.        /72 (BP Location: Left upper arm, Patient Position: Sitting)   Pulse 96   Temp 36.8 °C (98.3 °F) (Oral)   Resp 16   Ht 1.524 m (5')   Wt 87.5 kg (193 lb)   SpO2 98%   BMI 37.69 kg/m²   Objective     Physical Exam   Constitutional: She is oriented to person, place, and time. She appears well-developed and well-nourished. No distress.   HENT:   Head: Normocephalic and atraumatic.   Right Ear: External ear normal.   Left Ear: External ear normal.   Nose: Nose normal.   Mouth/Throat: No oropharyngeal exudate.   Clear pnd, mild erythema   Eyes: Pupils are equal, round, and reactive to light. Conjunctivae and EOM are normal. Right eye exhibits no discharge. Left eye exhibits no discharge. No scleral icterus.   Neck: Neck supple. No JVD present. No tracheal deviation present.   Cardiovascular: Normal rate, regular rhythm and normal heart sounds.    Pulmonary/Chest: Effort normal and breath sounds normal. No stridor. No respiratory distress. She has no wheezes. She has no rales.   Abdominal: Bowel sounds are normal.   Musculoskeletal: She exhibits no edema or deformity.   Lymphadenopathy:     She has no cervical adenopathy.   Neurological: She is alert and oriented to person, place, and time. No cranial nerve deficit. Coordination normal.   Skin: Skin is warm and dry. She is not diaphoretic. No erythema. No pallor.   Psychiatric: She has a normal mood and affect. Her behavior is normal. Judgment and thought content normal.       Assessment/Plan   Problem List Items Addressed This Visit        Respiratory    Acute URI     Appears viral, amox only if worsens/persists         Relevant Medications    amoxicillin (AMOXIL) 500 mg capsule       Circulatory     Hypertension     Controlled, continue present regimen              Other    Status post revision of total knee replacement - Primary     Knows to take 4 tabs 500mg amox before dental procedure pending             No problem-specific Assessment & Plan notes found for this encounter.    Problem List Items Addressed This Visit     None

## 2019-09-19 ENCOUNTER — TELEPHONE (OUTPATIENT)
Dept: FAMILY MEDICINE | Facility: CLINIC | Age: 64
End: 2019-09-19

## 2019-09-19 NOTE — TELEPHONE ENCOUNTER
Patient was seen on Tues, she says feels worse now, post nasal drip, cough, colored when blowing nose.    Moving into chest and has cough. [place] antibiotic she was given for dental appt.    Asking for antibiotic and cough med called into    [facility], [phone]    Patient can be reached at [phone]

## 2019-09-19 NOTE — TELEPHONE ENCOUNTER
You can call in amoxicillin as rx'd in her chart. Promethizine dm 5cc q 4 hours prn disp 8 ounces.  No refills either. If you have questions you can ask Dr. Padron since he is there today.

## 2019-12-02 RX ORDER — RAMIPRIL 2.5 MG/1
2.5 CAPSULE ORAL DAILY
Qty: 90 CAPSULE | Refills: 1 | Status: SHIPPED | OUTPATIENT
Start: 2019-12-02 | End: 2019-12-24 | Stop reason: SDUPTHER

## 2019-12-02 NOTE — TELEPHONE ENCOUNTER
Medicine Refill Request    Last Office Visit: 9/17/2019  Next Office Visit: 12/24/2019        Current Outpatient Medications:   •  aspirin 81 mg enteric coated tablet, Take 81 mg by mouth daily., Disp: , Rfl:   •  atorvastatin (LIPITOR) 20 mg tablet, Take 1 tablet (20 mg total) by mouth daily., Disp: 90 tablet, Rfl: 1  •  atorvastatin (LIPITOR) 20 mg tablet, Take 1 tablet (20 mg total) by mouth daily., Disp: 90 tablet, Rfl: 1  •  hydrocortisone 2.5 % cream, Apply topically 2 (two) times a day., Disp: 30 g, Rfl: 1  •  LORazepam (ATIVAN) 1 mg tablet, Take 1 tablet by mouth 3 (three) times a day., Disp: , Rfl:   •  metFORMIN (GLUCOPHAGE) 500 mg tablet, Take 1 tablet (500 mg total) by mouth daily., Disp: 90 tablet, Rfl: 1  •  omega-3 fatty acids-fish oil (omega-3) 360-1,200 mg capsule, Take 1 capsule by mouth daily., Disp: , Rfl:   •  penciclovir (DENAVIR) 1 % cream, Apply topically 4 (four) times a day., Disp: 1.5 g, Rfl: 1  •  triamterene-hydrochlorothiazid (DYAZIDE) 37.5-25 mg per capsule, Take 1 capsule by mouth daily., Disp: 90 capsule, Rfl: 1      BP Readings from Last 3 Encounters:   09/17/19 120/72   06/24/19 120/80   03/07/19 124/82       Recent Lab results:  Lab Results   Component Value Date    CHOL 165 06/12/2018   ,   Lab Results   Component Value Date    HDL 51 06/12/2018   ,   Lab Results   Component Value Date    LDLCALC 86 06/12/2018   ,   Lab Results   Component Value Date    TRIG 179 (H) 06/12/2018        Lab Results   Component Value Date    GLUCOSE 120 (H) 12/18/2018   ,   Lab Results   Component Value Date    HGBA1C 6.1 (H) 06/24/2019         Lab Results   Component Value Date    CREATININE 0.78 12/18/2018       Lab Results   Component Value Date    TSH 1.40 12/05/2016            Biopsy Type: H and E

## 2019-12-24 ENCOUNTER — OFFICE VISIT (OUTPATIENT)
Dept: FAMILY MEDICINE | Facility: CLINIC | Age: 64
End: 2019-12-24
Payer: COMMERCIAL

## 2019-12-24 VITALS
HEIGHT: 60 IN | DIASTOLIC BLOOD PRESSURE: 86 MMHG | OXYGEN SATURATION: 94 % | TEMPERATURE: 98 F | WEIGHT: 196.1 LBS | HEART RATE: 67 BPM | RESPIRATION RATE: 16 BRPM | SYSTOLIC BLOOD PRESSURE: 120 MMHG | BODY MASS INDEX: 38.5 KG/M2

## 2019-12-24 DIAGNOSIS — I10 DIABETES MELLITUS WITH COINCIDENT HYPERTENSION (CMS/HCC)  (CMS/HCC): ICD-10-CM

## 2019-12-24 DIAGNOSIS — E78.2 DM TYPE 2 WITH DIABETIC MIXED HYPERLIPIDEMIA (CMS/HCC)  (CMS/HCC): Primary | ICD-10-CM

## 2019-12-24 DIAGNOSIS — E11.9 DIABETES MELLITUS WITH COINCIDENT HYPERTENSION (CMS/HCC)  (CMS/HCC): ICD-10-CM

## 2019-12-24 DIAGNOSIS — E11.69 DM TYPE 2 WITH DIABETIC MIXED HYPERLIPIDEMIA (CMS/HCC)  (CMS/HCC): Primary | ICD-10-CM

## 2019-12-24 PROBLEM — J06.9 ACUTE URI: Status: RESOLVED | Noted: 2019-09-17 | Resolved: 2019-12-24

## 2019-12-24 PROCEDURE — 99214 OFFICE O/P EST MOD 30 MIN: CPT | Performed by: FAMILY MEDICINE

## 2019-12-24 PROCEDURE — 36415 COLL VENOUS BLD VENIPUNCTURE: CPT | Performed by: FAMILY MEDICINE

## 2019-12-24 RX ORDER — ATORVASTATIN CALCIUM 40 MG/1
TABLET, FILM COATED ORAL
COMMUNITY
Start: 2002-07-19 | End: 2020-06-15

## 2019-12-24 RX ORDER — TRIAMTERENE AND HYDROCHLOROTHIAZIDE 37.5; 25 MG/1; MG/1
1 CAPSULE ORAL EVERY MORNING
Qty: 90 CAPSULE | Refills: 1 | Status: SHIPPED | OUTPATIENT
Start: 2019-12-24

## 2019-12-24 RX ORDER — NYSTATIN AND TRIAMCINOLONE ACETONIDE 100000; 1 [USP'U]/G; MG/G
CREAM TOPICAL DAILY
COMMUNITY
Start: 2016-04-19 | End: 2022-01-11 | Stop reason: SDUPTHER

## 2019-12-24 RX ORDER — METFORMIN HYDROCHLORIDE 500 MG/1
500 TABLET ORAL DAILY
COMMUNITY
End: 2019-12-24 | Stop reason: SDUPTHER

## 2019-12-24 RX ORDER — RAMIPRIL 2.5 MG/1
2.5 CAPSULE ORAL DAILY
Qty: 90 CAPSULE | Refills: 1 | Status: SHIPPED | OUTPATIENT
Start: 2019-12-24 | End: 2020-07-30 | Stop reason: SDUPTHER

## 2019-12-24 RX ORDER — ATORVASTATIN CALCIUM 20 MG/1
20 TABLET, FILM COATED ORAL DAILY
COMMUNITY
End: 2019-12-24 | Stop reason: SDUPTHER

## 2019-12-24 RX ORDER — LINACLOTIDE 290 UG/1
CAPSULE, GELATIN COATED ORAL
Refills: 1 | COMMUNITY
Start: 2019-09-27 | End: 2019-12-24 | Stop reason: SDUPTHER

## 2019-12-24 RX ORDER — METFORMIN HYDROCHLORIDE 500 MG/1
500 TABLET ORAL DAILY
Qty: 90 TABLET | Refills: 1 | Status: SHIPPED | OUTPATIENT
Start: 2019-12-24 | End: 2020-07-30 | Stop reason: SDUPTHER

## 2019-12-24 RX ORDER — TRIAMTERENE AND HYDROCHLOROTHIAZIDE 37.5; 25 MG/1; MG/1
1 CAPSULE ORAL EVERY MORNING
COMMUNITY
End: 2019-12-24 | Stop reason: SDUPTHER

## 2019-12-24 RX ORDER — ATORVASTATIN CALCIUM 20 MG/1
20 TABLET, FILM COATED ORAL DAILY
Qty: 90 TABLET | Refills: 1 | Status: SHIPPED | OUTPATIENT
Start: 2019-12-24 | End: 2021-01-29 | Stop reason: ENTERED-IN-ERROR

## 2019-12-24 RX ORDER — HYDROCORTISONE 25 MG/G
CREAM TOPICAL
COMMUNITY
Start: 2015-03-24 | End: 2020-07-30 | Stop reason: SDUPTHER

## 2019-12-24 RX ORDER — LINACLOTIDE 290 UG/1
290 CAPSULE, GELATIN COATED ORAL
Qty: 90 CAPSULE | Refills: 1 | Status: SHIPPED | OUTPATIENT
Start: 2019-12-24 | End: 2021-08-23 | Stop reason: SDUPTHER

## 2019-12-24 NOTE — PROGRESS NOTES
Subjective     Isabel Spivey is here for follow up of elevated cholesterol. Compliance with treatment has been excellent. The patient exercises rarely. Patient denies muscle pain associated with her medications.    The following portions of the patient's history were reviewed and updated as appropriate: allergies, current medications, past family history, past medical history, past social history, past surgical history and problem list.    Review of Systems  Constitutional: negative  Eyes: negative  Ears, nose, mouth, throat, and face: negative  Respiratory: negative  Cardiovascular: negative  Musculoskeletal:negative  Neurological: negative  Behavioral/Psych: negative.    Objective     Visit Vitals  /86 (BP Location: Left upper arm, Patient Position: Sitting)   Pulse 67   Temp 36.7 °C (98 °F)   Resp 16   Ht 1.524 m (5')   Wt 89 kg (196 lb 1.6 oz)   SpO2 94%   BMI 38.30 kg/m²   Fabian CANALES DO, personally performed the services described in this documentation as scribed by Julianne Coates in my presence, and it is both accurate and complete.  General appearance: alert, appears stated age and cooperative  Head: normocephalic, without obvious abnormality, atraumatic  Eyes: conjunctivae/corneas clear. PERRL, EOM's intact. Fundi benign.  Ears: normal TM's and external ear canals both ears  Throat: lips, mucosa, and tongue normal; teeth and gums normal  Lungs: clear to auscultation bilaterally  Extremities: extremities normal, warm and well-perfused; no cyanosis, clubbing, or edema  Pulses: 2+ and symmetric.    Lab Review  Lab Results   Component Value Date    CHOL 165 06/12/2018    CHOL 141 12/13/2017    CHOL 166 06/06/2017    TRIG 179 (H) 06/12/2018    TRIG 161 (H) 12/13/2017    TRIG 214 (H) 06/06/2017    HDL 51 06/12/2018    HDL 45 (L) 12/13/2017    HDL 46 06/06/2017       Assessment/Plan     Dyslipidemia under adequate control.    1. Continue dietary measures.  2. Continue regular exercise.  3.  Lipid-lowering medications: Atorvastatin 40mg QHS  4. Follow up in 6 months.    Julianne CANALES, am scribing for, and in the presence of, Fabian Padron DO.    12/24/2019 8:35 AM

## 2019-12-25 LAB
ALBUMIN SERPL-MCNC: 4.1 G/DL (ref 3.6–5.1)
ALBUMIN/GLOB SERPL: 1.5 (CALC) (ref 1–2.5)
ALP SERPL-CCNC: 99 U/L (ref 33–130)
ALT SERPL-CCNC: 18 U/L (ref 6–29)
AST SERPL-CCNC: 15 U/L (ref 10–35)
BILIRUB SERPL-MCNC: 0.5 MG/DL (ref 0.2–1.2)
BUN SERPL-MCNC: 18 MG/DL (ref 7–25)
BUN/CREAT SERPL: ABNORMAL (CALC) (ref 6–22)
CALCIUM SERPL-MCNC: 9.5 MG/DL (ref 8.6–10.4)
CHLORIDE SERPL-SCNC: 107 MMOL/L (ref 98–110)
CHOLEST SERPL-MCNC: 154 MG/DL
CHOLEST/HDLC SERPL: 3.3 (CALC)
CO2 SERPL-SCNC: 28 MMOL/L (ref 20–32)
CREAT SERPL-MCNC: 0.68 MG/DL (ref 0.5–0.99)
GLOBULIN SER CALC-MCNC: 2.7 G/DL (CALC) (ref 1.9–3.7)
GLUCOSE SERPL-MCNC: 119 MG/DL (ref 65–99)
HBA1C MFR BLD: 6.2 % OF TOTAL HGB
HDLC SERPL-MCNC: 46 MG/DL
LDLC SERPL CALC-MCNC: 78 MG/DL (CALC)
NONHDLC SERPL-MCNC: 108 MG/DL (CALC)
POTASSIUM SERPL-SCNC: 5 MMOL/L (ref 3.5–5.3)
PROT SERPL-MCNC: 6.8 G/DL (ref 6.1–8.1)
QUEST EGFR NON-AFR. AMERICAN: 91 ML/MIN/1.73M2
SODIUM SERPL-SCNC: 141 MMOL/L (ref 135–146)
TRIGL SERPL-MCNC: 199 MG/DL

## 2020-01-06 RX ORDER — HYDROCORTISONE 25 MG/G
CREAM TOPICAL 2 TIMES DAILY
Qty: 28.35 G | Refills: 1 | Status: SHIPPED | OUTPATIENT
Start: 2020-01-06 | End: 2022-07-15 | Stop reason: SDUPTHER

## 2020-07-30 ENCOUNTER — OFFICE VISIT (OUTPATIENT)
Dept: FAMILY MEDICINE | Facility: CLINIC | Age: 65
End: 2020-07-30
Payer: COMMERCIAL

## 2020-07-30 VITALS
WEIGHT: 188 LBS | SYSTOLIC BLOOD PRESSURE: 110 MMHG | TEMPERATURE: 97.7 F | BODY MASS INDEX: 36.91 KG/M2 | HEART RATE: 71 BPM | HEIGHT: 60 IN | OXYGEN SATURATION: 98 % | RESPIRATION RATE: 16 BRPM | DIASTOLIC BLOOD PRESSURE: 60 MMHG

## 2020-07-30 DIAGNOSIS — Z23 NEED FOR 23-POLYVALENT PNEUMOCOCCAL POLYSACCHARIDE VACCINE: ICD-10-CM

## 2020-07-30 DIAGNOSIS — E11.69 DM TYPE 2 WITH DIABETIC MIXED HYPERLIPIDEMIA (CMS/HCC)  (CMS/HCC): Primary | ICD-10-CM

## 2020-07-30 DIAGNOSIS — E78.2 DM TYPE 2 WITH DIABETIC MIXED HYPERLIPIDEMIA (CMS/HCC)  (CMS/HCC): Primary | ICD-10-CM

## 2020-07-30 PROCEDURE — 90732 PPSV23 VACC 2 YRS+ SUBQ/IM: CPT | Performed by: FAMILY MEDICINE

## 2020-07-30 PROCEDURE — 99214 OFFICE O/P EST MOD 30 MIN: CPT | Mod: 25 | Performed by: FAMILY MEDICINE

## 2020-07-30 PROCEDURE — 90471 IMMUNIZATION ADMIN: CPT | Performed by: FAMILY MEDICINE

## 2020-07-30 RX ORDER — RAMIPRIL 2.5 MG/1
2.5 CAPSULE ORAL DAILY
Qty: 90 CAPSULE | Refills: 1 | Status: SHIPPED | OUTPATIENT
Start: 2020-07-30 | End: 2021-02-24

## 2020-07-30 RX ORDER — ATORVASTATIN CALCIUM 20 MG/1
20 TABLET, FILM COATED ORAL DAILY
Qty: 90 TABLET | Refills: 1 | Status: SHIPPED | OUTPATIENT
Start: 2020-07-30 | End: 2021-01-29 | Stop reason: ENTERED-IN-ERROR

## 2020-07-30 RX ORDER — HYDROCORTISONE 25 MG/G
CREAM TOPICAL 2 TIMES DAILY
Qty: 30 G | Refills: 1 | Status: SHIPPED | OUTPATIENT
Start: 2020-07-30 | End: 2023-01-19 | Stop reason: SDUPTHER

## 2020-07-30 RX ORDER — METFORMIN HYDROCHLORIDE 500 MG/1
500 TABLET ORAL DAILY
Qty: 90 TABLET | Refills: 1 | Status: SHIPPED | OUTPATIENT
Start: 2020-07-30 | End: 2021-02-24

## 2020-07-30 SDOH — HEALTH STABILITY: MENTAL HEALTH: HOW OFTEN DO YOU HAVE SIX OR MORE DRINKS ON ONE OCCASION?: NEVER

## 2020-07-30 SDOH — HEALTH STABILITY: MENTAL HEALTH: HOW OFTEN DO YOU HAVE A DRINK CONTAINING ALCOHOL?: MONTHLY OR LESS

## 2020-07-30 NOTE — PROGRESS NOTES
Subjective     Isabel Spivey is here for follow up of elevated cholesterol. Compliance with treatment has been excellent. The patient walks for exercise. Patient denies muscle pain associated with her medications.    The following portions of the patient's history were reviewed and updated as appropriate: allergies, current medications, past family history, past medical history, past social history, past surgical history and problem list.    Review of Systems  Constitutional: negative  Eyes: negative  Ears, nose, mouth, throat, and face: negative  Respiratory: negative  Cardiovascular: negative  Musculoskeletal:negative.    Objective     Visit Vitals  /60 (BP Location: Left upper arm, Patient Position: Sitting)   Pulse 71   Temp 36.5 °C (97.7 °F) (Skin)   Resp 16   Ht 1.524 m (5')   Wt 85.3 kg (188 lb)   SpO2 98%   BMI 36.72 kg/m²     General appearance: alert, appears stated age and cooperative  Head: normocephalic, without obvious abnormality, atraumatic  Eyes: conjunctivae/corneas clear. PERRL, EOM's intact. Fundi benign.  Ears: normal TM's and external ear canals both ears  Throat: lips, mucosa, and tongue normal; teeth and gums normal  Lungs: clear to auscultation bilaterally  Extremities: extremities normal, warm and well-perfused; no cyanosis, clubbing, or edema  Pulses: 2+ and symmetric.    Lab Review  Lab Results   Component Value Date    CHOL 154 12/24/2019    CHOL 165 06/12/2018    CHOL 141 12/13/2017    TRIG 199 (H) 12/24/2019    TRIG 179 (H) 06/12/2018    TRIG 161 (H) 12/13/2017    HDL 46 (L) 12/24/2019    HDL 51 06/12/2018    HDL 45 (L) 12/13/2017       Assessment/Plan     Dyslipidemia under adequate control.  Diabetes is controlled.   Fasting labs ordered   1. Continue dietary measures.  2. Continue regular exercise.  3. Lipid-lowering medications: Atorvastatin 20mg.   4. Follow up in 6 months.    Julianne CANALES, am scribing for, and in the presence of, Fabian Padron,  .    7/30/2020 8:22 AM  IFabian DO, personally performed the services described in this documentation as scribed by Julianne Coates in my presence, and it is both accurate and complete.

## 2020-07-31 LAB
ALBUMIN SERPL-MCNC: 4.3 G/DL (ref 3.6–5.1)
ALBUMIN/GLOB SERPL: 1.6 (CALC) (ref 1–2.5)
ALP SERPL-CCNC: 106 U/L (ref 37–153)
ALT SERPL-CCNC: 20 U/L (ref 6–29)
AST SERPL-CCNC: 19 U/L (ref 10–35)
BASOPHILS # BLD AUTO: 57 CELLS/UL (ref 0–200)
BASOPHILS NFR BLD AUTO: 0.9 %
BILIRUB SERPL-MCNC: 0.6 MG/DL (ref 0.2–1.2)
BUN SERPL-MCNC: 15 MG/DL (ref 7–25)
BUN/CREAT SERPL: ABNORMAL (CALC) (ref 6–22)
CALCIUM SERPL-MCNC: 9.7 MG/DL (ref 8.6–10.4)
CHLORIDE SERPL-SCNC: 104 MMOL/L (ref 98–110)
CHOLEST SERPL-MCNC: 137 MG/DL
CHOLEST/HDLC SERPL: 3.1 (CALC)
CO2 SERPL-SCNC: 22 MMOL/L (ref 20–32)
CREAT SERPL-MCNC: 0.75 MG/DL (ref 0.5–0.99)
EOSINOPHIL # BLD AUTO: 221 CELLS/UL (ref 15–500)
EOSINOPHIL NFR BLD AUTO: 3.5 %
ERYTHROCYTE [DISTWIDTH] IN BLOOD BY AUTOMATED COUNT: 13 % (ref 11–15)
GLOBULIN SER CALC-MCNC: 2.7 G/DL (CALC) (ref 1.9–3.7)
GLUCOSE SERPL-MCNC: 116 MG/DL (ref 65–99)
HBA1C MFR BLD: 6 % OF TOTAL HGB
HCT VFR BLD AUTO: 44 % (ref 35–45)
HDLC SERPL-MCNC: 44 MG/DL
HGB BLD-MCNC: 14.7 G/DL (ref 11.7–15.5)
LDLC SERPL CALC-MCNC: 67 MG/DL (CALC)
LYMPHOCYTES # BLD AUTO: 1424 CELLS/UL (ref 850–3900)
LYMPHOCYTES NFR BLD AUTO: 22.6 %
MCH RBC QN AUTO: 30.1 PG (ref 27–33)
MCHC RBC AUTO-ENTMCNC: 33.4 G/DL (ref 32–36)
MCV RBC AUTO: 90 FL (ref 80–100)
MONOCYTES # BLD AUTO: 548 CELLS/UL (ref 200–950)
MONOCYTES NFR BLD AUTO: 8.7 %
NEUTROPHILS # BLD AUTO: 4051 CELLS/UL (ref 1500–7800)
NEUTROPHILS NFR BLD AUTO: 64.3 %
NONHDLC SERPL-MCNC: 93 MG/DL (CALC)
PLATELET # BLD AUTO: 263 THOUSAND/UL (ref 140–400)
PMV BLD REES-ECKER: 10.6 FL (ref 7.5–12.5)
POTASSIUM SERPL-SCNC: 4.3 MMOL/L (ref 3.5–5.3)
PROT SERPL-MCNC: 7 G/DL (ref 6.1–8.1)
QUEST EGFR NON-AFR. AMERICAN: 82 ML/MIN/1.73M2
RBC # BLD AUTO: 4.89 MILLION/UL (ref 3.8–5.1)
SODIUM SERPL-SCNC: 138 MMOL/L (ref 135–146)
TRIGL SERPL-MCNC: 192 MG/DL
WBC # BLD AUTO: 6.3 THOUSAND/UL (ref 3.8–10.8)

## 2020-10-08 ENCOUNTER — CLINICAL SUPPORT (OUTPATIENT)
Dept: FAMILY MEDICINE | Facility: CLINIC | Age: 65
End: 2020-10-08
Payer: COMMERCIAL

## 2020-10-08 VITALS — TEMPERATURE: 97.5 F

## 2020-10-08 DIAGNOSIS — Z23 NEED FOR IMMUNIZATION AGAINST INFLUENZA: Primary | ICD-10-CM

## 2020-10-08 PROCEDURE — 200200 INFLUENZA VACCINE QUAD ADJUVANTED 65 AND OLDER: Performed by: INTERNAL MEDICINE

## 2020-10-08 PROCEDURE — 200200 PR NO CHARGE: Performed by: INTERNAL MEDICINE

## 2020-10-08 PROCEDURE — 90471 IMMUNIZATION ADMIN: CPT | Performed by: INTERNAL MEDICINE

## 2020-10-08 PROCEDURE — 90694 VACC AIIV4 NO PRSRV 0.5ML IM: CPT | Performed by: INTERNAL MEDICINE

## 2020-12-16 RX ORDER — ATORVASTATIN CALCIUM 40 MG/1
40 TABLET, FILM COATED ORAL
Qty: 90 TABLET | Refills: 1 | Status: SHIPPED | OUTPATIENT
Start: 2020-12-16 | End: 2021-05-11 | Stop reason: SDUPTHER

## 2020-12-16 NOTE — TELEPHONE ENCOUNTER
Medicine Refill Request    Last Office Visit: 7/30/2020  Last Telemedicine Visit: Visit date not found    Next Office Visit: 1/29/2021  Next Telemedicine Visit: Visit date not found         Current Outpatient Medications:   •  aspirin 81 mg enteric coated tablet, Take 81 mg by mouth daily., Disp: , Rfl:   •  atorvastatin (LIPITOR) 20 mg tablet, Take 1 tablet (20 mg total) by mouth daily., Disp: 90 tablet, Rfl: 1  •  atorvastatin (LIPITOR) 20 mg tablet, Take 1 tablet (20 mg total) by mouth daily., Disp: 90 tablet, Rfl: 1  •  atorvastatin (LIPITOR) 20 mg tablet, Take 1 tablet (20 mg total) by mouth daily., Disp: 90 tablet, Rfl: 1  •  atorvastatin (LIPITOR) 40 mg tablet, Take 1 tablet (40 mg total) by mouth once daily., Disp: 90 tablet, Rfl: 1  •  hydrocortisone 2.5 % cream, Apply topically 2 (two) times a day., Disp: 28.35 g, Rfl: 1  •  hydrocortisone 2.5 % cream, Apply topically 2 (two) times a day., Disp: 30 g, Rfl: 1  •  LINZESS 290 mcg capsule, Take 1 capsule (290 mcg total) by mouth daily before breakfast., Disp: 90 capsule, Rfl: 1  •  LORazepam (ATIVAN) 1 mg tablet, Take 1 tablet by mouth 3 (three) times a day., Disp: , Rfl:   •  metFORMIN (GLUCOPHAGE) 500 mg tablet, Take 1 tablet (500 mg total) by mouth daily., Disp: 90 tablet, Rfl: 1  •  metFORMIN (GLUCOPHAGE) 500 mg tablet, Take 1 tablet (500 mg total) by mouth daily., Disp: 90 tablet, Rfl: 1  •  nystatin-triamcinolone (MYCOLOG II) 100,000-0.1 unit/g-% cream, Apply topically daily., Disp: , Rfl:   •  omega-3 fatty acids-fish oil (omega-3) 360-1,200 mg capsule, Take 1 capsule by mouth daily., Disp: , Rfl:   •  penciclovir (DENAVIR) 1 % cream, Apply topically 4 (four) times a day., Disp: 1.5 g, Rfl: 1  •  ramipriL (ALTACE) 2.5 mg capsule, Take 1 capsule (2.5 mg total) by mouth daily., Disp: 90 capsule, Rfl: 1  •  triamterene-hydrochlorothiazid (DYAZIDE) 37.5-25 mg per capsule, Take 1 capsule by mouth daily., Disp: 90 capsule, Rfl: 1  •   triamterene-hydrochlorothiazid (DYAZIDE) 37.5-25 mg per capsule, Take 1 capsule by mouth every morning., Disp: 90 capsule, Rfl: 1      BP Readings from Last 3 Encounters:   07/30/20 110/60   12/24/19 120/86   09/17/19 120/72       Recent Lab results:  Lab Results   Component Value Date    CHOL 137 07/30/2020   ,   Lab Results   Component Value Date    HDL 44 (L) 07/30/2020   ,   Lab Results   Component Value Date    LDLCALC 67 07/30/2020   ,   Lab Results   Component Value Date    TRIG 192 (H) 07/30/2020        Lab Results   Component Value Date    GLUCOSE 116 (H) 07/30/2020   ,   Lab Results   Component Value Date    HGBA1C 6.0 (H) 07/30/2020         Lab Results   Component Value Date    CREATININE 0.75 07/30/2020       Lab Results   Component Value Date    TSH 1.40 12/05/2016

## 2021-01-29 ENCOUNTER — OFFICE VISIT (OUTPATIENT)
Dept: FAMILY MEDICINE | Facility: CLINIC | Age: 66
End: 2021-01-29
Payer: COMMERCIAL

## 2021-01-29 VITALS
BODY MASS INDEX: 36.06 KG/M2 | DIASTOLIC BLOOD PRESSURE: 82 MMHG | HEART RATE: 86 BPM | SYSTOLIC BLOOD PRESSURE: 132 MMHG | TEMPERATURE: 97.1 F | HEIGHT: 61 IN | OXYGEN SATURATION: 97 % | WEIGHT: 191 LBS | RESPIRATION RATE: 16 BRPM

## 2021-01-29 DIAGNOSIS — I10 ESSENTIAL HYPERTENSION: ICD-10-CM

## 2021-01-29 DIAGNOSIS — E78.2 MIXED HYPERLIPIDEMIA: ICD-10-CM

## 2021-01-29 DIAGNOSIS — E66.01 SEVERE OBESITY WITH BODY MASS INDEX (BMI) OF 36.0 TO 36.9 WITH SERIOUS COMORBIDITY (CMS/HCC): ICD-10-CM

## 2021-01-29 DIAGNOSIS — I25.10 CORONARY ARTERY DISEASE INVOLVING NATIVE CORONARY ARTERY OF NATIVE HEART WITHOUT ANGINA PECTORIS: ICD-10-CM

## 2021-01-29 DIAGNOSIS — E11.69 DM TYPE 2 WITH DIABETIC MIXED HYPERLIPIDEMIA (CMS/HCC)  (CMS/HCC): Primary | ICD-10-CM

## 2021-01-29 DIAGNOSIS — E78.2 DM TYPE 2 WITH DIABETIC MIXED HYPERLIPIDEMIA (CMS/HCC)  (CMS/HCC): Primary | ICD-10-CM

## 2021-01-29 PROBLEM — E66.812 CLASS 2 SEVERE OBESITY DUE TO EXCESS CALORIES WITH SERIOUS COMORBIDITY AND BODY MASS INDEX (BMI) OF 38.0 TO 38.9 IN ADULT (CMS/HCC): Status: RESOLVED | Noted: 2019-03-07 | Resolved: 2021-01-29

## 2021-01-29 PROCEDURE — 99214 OFFICE O/P EST MOD 30 MIN: CPT | Performed by: INTERNAL MEDICINE

## 2021-01-29 ASSESSMENT — PATIENT HEALTH QUESTIONNAIRE - PHQ9: SUM OF ALL RESPONSES TO PHQ9 QUESTIONS 1 & 2: 0

## 2021-01-29 NOTE — PROGRESS NOTES
"      Subjective      Patient ID: Isabel Spivey is a 65 y.o. female.  1955      64 y/o female presents for follow up   Feeling well   No acute issues/concerns    HTN - no headaches, dizziness, lightheadedness, CP  Checking at home   Tolerating meds without side effects    DM - on metformin impaired fasting glucose     Lipids at goal on statin         The following have been reviewed and updated as appropriate in this visit:  Problems       Review of Systems   All other systems reviewed and are negative.      Objective     Vitals:    01/29/21 0902   BP: 132/82   BP Location: Left upper arm   Patient Position: Sitting   Pulse: 86   Resp: 16   Temp: 36.2 °C (97.1 °F)   TempSrc: Temporal   SpO2: 97%   Weight: 86.6 kg (191 lb)   Height: 1.537 m (5' 0.5\")     Body mass index is 36.69 kg/m².    Physical Exam  Constitutional:       Appearance: Normal appearance.   Cardiovascular:      Rate and Rhythm: Normal rate and regular rhythm.      Pulses: Normal pulses.      Heart sounds: Normal heart sounds. No murmur.   Pulmonary:      Effort: Pulmonary effort is normal.      Breath sounds: Normal breath sounds. No wheezing or rhonchi.   Neurological:      Mental Status: She is alert.         Assessment/Plan   Diagnoses and all orders for this visit:    DM type 2 with diabetic mixed hyperlipidemia (CMS/HCC) (Primary)  -     Comprehensive metabolic panel  -     CBC and Differential  -     Hemoglobin A1c  -     Lipid panel  Labs today doing well   Mixed hyperlipidemia  At goal on statin   Coronary artery disease involving native coronary artery of native heart without angina pectoris  Follows up cardio  Essential hypertension  At  Goal   Severe obesity with body mass index (BMI) of 36.0 to 36.9 with serious comorbidity (CMS/HCC)  Lifestyle conscious      "

## 2021-01-30 LAB
ALBUMIN SERPL-MCNC: 4.3 G/DL (ref 3.6–5.1)
ALBUMIN/GLOB SERPL: 1.7 (CALC) (ref 1–2.5)
ALP SERPL-CCNC: 100 U/L (ref 37–153)
ALT SERPL-CCNC: 23 U/L (ref 6–29)
AST SERPL-CCNC: 20 U/L (ref 10–35)
BASOPHILS # BLD AUTO: 81 CELLS/UL (ref 0–200)
BASOPHILS NFR BLD AUTO: 1.1 %
BILIRUB SERPL-MCNC: 0.5 MG/DL (ref 0.2–1.2)
BUN SERPL-MCNC: 15 MG/DL (ref 7–25)
BUN/CREAT SERPL: ABNORMAL (CALC) (ref 6–22)
CALCIUM SERPL-MCNC: 9.8 MG/DL (ref 8.6–10.4)
CHLORIDE SERPL-SCNC: 107 MMOL/L (ref 98–110)
CHOLEST SERPL-MCNC: 128 MG/DL
CHOLEST/HDLC SERPL: 2.8 (CALC)
CO2 SERPL-SCNC: 27 MMOL/L (ref 20–32)
CREAT SERPL-MCNC: 0.66 MG/DL (ref 0.5–0.99)
EOSINOPHIL # BLD AUTO: 237 CELLS/UL (ref 15–500)
EOSINOPHIL NFR BLD AUTO: 3.2 %
ERYTHROCYTE [DISTWIDTH] IN BLOOD BY AUTOMATED COUNT: 12.6 % (ref 11–15)
GLOBULIN SER CALC-MCNC: 2.6 G/DL (CALC) (ref 1.9–3.7)
GLUCOSE SERPL-MCNC: 110 MG/DL (ref 65–99)
HBA1C MFR BLD: 5.7 % OF TOTAL HGB
HCT VFR BLD AUTO: 43.1 % (ref 35–45)
HDLC SERPL-MCNC: 46 MG/DL
HGB BLD-MCNC: 14.3 G/DL (ref 11.7–15.5)
LDLC SERPL CALC-MCNC: 61 MG/DL (CALC)
LYMPHOCYTES # BLD AUTO: 1428 CELLS/UL (ref 850–3900)
LYMPHOCYTES NFR BLD AUTO: 19.3 %
MCH RBC QN AUTO: 29.4 PG (ref 27–33)
MCHC RBC AUTO-ENTMCNC: 33.2 G/DL (ref 32–36)
MCV RBC AUTO: 88.5 FL (ref 80–100)
MONOCYTES # BLD AUTO: 688 CELLS/UL (ref 200–950)
MONOCYTES NFR BLD AUTO: 9.3 %
NEUTROPHILS # BLD AUTO: 4965 CELLS/UL (ref 1500–7800)
NEUTROPHILS NFR BLD AUTO: 67.1 %
NONHDLC SERPL-MCNC: 82 MG/DL (CALC)
PLATELET # BLD AUTO: 264 THOUSAND/UL (ref 140–400)
PMV BLD REES-ECKER: 10.4 FL (ref 7.5–12.5)
POTASSIUM SERPL-SCNC: 4.6 MMOL/L (ref 3.5–5.3)
PROT SERPL-MCNC: 6.9 G/DL (ref 6.1–8.1)
QUEST EGFR NON-AFR. AMERICAN: 91 ML/MIN/1.73M2
RBC # BLD AUTO: 4.87 MILLION/UL (ref 3.8–5.1)
SODIUM SERPL-SCNC: 141 MMOL/L (ref 135–146)
TRIGL SERPL-MCNC: 125 MG/DL
WBC # BLD AUTO: 7.4 THOUSAND/UL (ref 3.8–10.8)

## 2021-02-11 ENCOUNTER — TELEPHONE (OUTPATIENT)
Dept: FAMILY MEDICINE | Facility: CLINIC | Age: 66
End: 2021-02-11

## 2021-02-24 RX ORDER — RAMIPRIL 2.5 MG/1
CAPSULE ORAL
Qty: 90 CAPSULE | Refills: 1 | Status: SHIPPED | OUTPATIENT
Start: 2021-02-24 | End: 2021-08-23 | Stop reason: SDUPTHER

## 2021-02-24 RX ORDER — METFORMIN HYDROCHLORIDE 500 MG/1
TABLET ORAL
Qty: 90 TABLET | Refills: 1 | Status: SHIPPED | OUTPATIENT
Start: 2021-02-24 | End: 2021-08-23

## 2021-02-24 NOTE — TELEPHONE ENCOUNTER
Medicine Refill Request    Last Office Visit: 1/29/2021  Last Telemedicine Visit: Visit date not found    Next Office Visit: 7/9/2021  Next Telemedicine Visit: Visit date not found         Current Outpatient Medications:   •  aspirin 81 mg enteric coated tablet, Take 81 mg by mouth daily., Disp: , Rfl:   •  atorvastatin (LIPITOR) 40 mg tablet, Take 1 tablet (40 mg total) by mouth once daily., Disp: 90 tablet, Rfl: 1  •  hydrocortisone 2.5 % cream, Apply topically 2 (two) times a day., Disp: 28.35 g, Rfl: 1  •  hydrocortisone 2.5 % cream, Apply topically 2 (two) times a day., Disp: 30 g, Rfl: 1  •  LINZESS 290 mcg capsule, Take 1 capsule (290 mcg total) by mouth daily before breakfast., Disp: 90 capsule, Rfl: 1  •  LORazepam (ATIVAN) 1 mg tablet, Take 1 tablet by mouth 3 (three) times a day., Disp: , Rfl:   •  metFORMIN (GLUCOPHAGE) 500 mg tablet, Take 1 tablet (500 mg total) by mouth daily., Disp: 90 tablet, Rfl: 1  •  nystatin-triamcinolone (MYCOLOG II) 100,000-0.1 unit/g-% cream, Apply topically daily., Disp: , Rfl:   •  omega-3 fatty acids-fish oil (omega-3) 360-1,200 mg capsule, Take 1 capsule by mouth daily., Disp: , Rfl:   •  penciclovir (DENAVIR) 1 % cream, Apply topically 4 (four) times a day., Disp: 1.5 g, Rfl: 1  •  ramipriL (ALTACE) 2.5 mg capsule, Take 1 capsule (2.5 mg total) by mouth daily., Disp: 90 capsule, Rfl: 1  •  triamterene-hydrochlorothiazid (DYAZIDE) 37.5-25 mg per capsule, Take 1 capsule by mouth every morning. (Patient taking differently: Take 1 capsule by mouth 2 (two) times a week (Mon, Thu).  ), Disp: 90 capsule, Rfl: 1      BP Readings from Last 3 Encounters:   01/29/21 132/82   07/30/20 110/60   12/24/19 120/86       Recent Lab results:  Lab Results   Component Value Date    CHOL 128 01/29/2021   ,   Lab Results   Component Value Date    HDL 46 (L) 01/29/2021   ,   Lab Results   Component Value Date    LDLCALC 61 01/29/2021   ,   Lab Results   Component Value Date    TRIG 125  01/29/2021        Lab Results   Component Value Date    GLUCOSE 110 (H) 01/29/2021   ,   Lab Results   Component Value Date    HGBA1C 5.7 (H) 01/29/2021         Lab Results   Component Value Date    CREATININE 0.66 01/29/2021       Lab Results   Component Value Date    TSH 1.40 12/05/2016

## 2021-03-04 ENCOUNTER — OFFICE VISIT (OUTPATIENT)
Dept: CARDIOLOGY | Facility: CLINIC | Age: 66
End: 2021-03-04
Payer: COMMERCIAL

## 2021-03-04 VITALS
SYSTOLIC BLOOD PRESSURE: 120 MMHG | BODY MASS INDEX: 37.11 KG/M2 | HEIGHT: 60 IN | HEART RATE: 92 BPM | WEIGHT: 189 LBS | DIASTOLIC BLOOD PRESSURE: 78 MMHG | OXYGEN SATURATION: 97 %

## 2021-03-04 DIAGNOSIS — I25.10 CORONARY ARTERY DISEASE INVOLVING NATIVE CORONARY ARTERY OF NATIVE HEART WITHOUT ANGINA PECTORIS: Primary | ICD-10-CM

## 2021-03-04 DIAGNOSIS — E78.2 MIXED HYPERLIPIDEMIA: ICD-10-CM

## 2021-03-04 DIAGNOSIS — I10 ESSENTIAL HYPERTENSION: ICD-10-CM

## 2021-03-04 PROCEDURE — 93000 ELECTROCARDIOGRAM COMPLETE: CPT | Performed by: INTERNAL MEDICINE

## 2021-03-04 PROCEDURE — 99214 OFFICE O/P EST MOD 30 MIN: CPT | Performed by: INTERNAL MEDICINE

## 2021-03-04 RX ORDER — ASCORBIC ACID 250 MG
250 TABLET ORAL DAILY
COMMUNITY

## 2021-03-04 RX ORDER — VITAMIN B COMPLEX
1 CAPSULE ORAL DAILY
COMMUNITY

## 2021-03-04 ASSESSMENT — ENCOUNTER SYMPTOMS
WEIGHT GAIN: 0
SNORING: 0
BRUISES/BLEEDS EASILY: 0
DYSPNEA ON EXERTION: 0
VOMITING: 0
LEFT EYE: 0
WHEEZING: 0
MYALGIAS: 0
ABDOMINAL PAIN: 0
DEPRESSION: 0
RIGHT EYE: 0
SYNCOPE: 0
DIZZINESS: 1
COUGH: 0
PALPITATIONS: 0
PND: 0
ALTERED MENTAL STATUS: 0
NEAR-SYNCOPE: 0
LIGHT-HEADEDNESS: 0
HEMATURIA: 0
NAUSEA: 0
SHORTNESS OF BREATH: 0
IRREGULAR HEARTBEAT: 0
ORTHOPNEA: 0
HEMATOCHEZIA: 0

## 2021-03-04 NOTE — LETTER
March 4, 2021                                                                                                                                                                                                                                                                                                                   Patient: Isabel Spivey   YOB: 1955   Date of Visit: 3/4/2021       Dear Dr. Bowens:    Thank you for the opportunity of seeing your patient, Isabel Spivey, today, 3/4/2021. Following is a summary of today's visit and my recommendation(s).    Thank you for allowing us to participate in Isabel's care.  Please feel free to contact me with any questions.      Assessment / Plan:  Problem List Items Addressed This Visit        Circulatory    Coronary artery disease involving native coronary artery of native heart without angina pectoris - Primary     Coronary calcium score 4/2/18: Left main 0.  .  .  LCx 169. Composite 562 is between the 95 and 100 percentile for females her age.  Asymptomatic.  --Continue aspirin and high intensity statin.  --Lifestyle modification discussed at length, particularly recommending improve dietary habits, increase regular aerobic activity, and weight loss.         Relevant Orders    ECG 12 lead (Completed)    Essential hypertension     Blood pressure acceptable, measuring 120/78 mmHg on my manual reading.   --Continue current regimen: Ramipril 2.5 mg daily and triamterene-HCTZ.   --Goal blood pressure less than 130/80 mmHg.  --Lifestyle modification discussed, encouraged increased exercise which she has started.            Endocrine/Metabolic    Mixed hyperlipidemia     From 1/29/2021: Total cholesterol 128, triglycerides 125, HDL 46, LDL 61.  Low HDL with acceptable LDL.  Severe constipation with higher dose atorvastatin.   Comorbidities: Coronary atherosclerosis and diabetes mellitus.  --Continue high intensity statin: Atorvastatin 40 mg  daily.  --Goal LDL less than 70 given comorbidities.  --Lifestyle modification discussed at length.                      Sincerely,      Elaina Daniel MD    CC: No Recipients

## 2021-03-04 NOTE — ASSESSMENT & PLAN NOTE
Blood pressure acceptable, measuring 120/78 mmHg on my manual reading.   --Continue current regimen: Ramipril 2.5 mg daily and triamterene-HCTZ.   --Goal blood pressure less than 130/80 mmHg.  --Lifestyle modification discussed, encouraged increased exercise which she has started.

## 2021-03-04 NOTE — ASSESSMENT & PLAN NOTE
From 1/29/2021: Total cholesterol 128, triglycerides 125, HDL 46, LDL 61.  Low HDL with acceptable LDL.  Severe constipation with higher dose atorvastatin.   Comorbidities: Coronary atherosclerosis and diabetes mellitus.  --Continue high intensity statin: Atorvastatin 40 mg daily.  --Goal LDL less than 70 given comorbidities.  --Lifestyle modification discussed at length.

## 2021-03-04 NOTE — ASSESSMENT & PLAN NOTE
Coronary calcium score 4/2/18: Left main 0.  .  .  LCx 169. Composite 562 is between the 95 and 100 percentile for females her age.  Asymptomatic.  --Continue aspirin and high intensity statin.  --Lifestyle modification discussed at length, particularly recommending improve dietary habits, increase regular aerobic activity, and weight loss.

## 2021-03-04 NOTE — PROGRESS NOTES
Elaina Daniel MD, [facility]  Cardiology    [facility]    [facility] [address]  [address] [place] [address]    TEL  [phone]  [facility]     2021    Reason for visit: Cardiovascular follow-up.    Isabel Spivey is a 65 y.o. female who presents for cardiovascular follow-up.  Isabel has a past medical history of hypertension, hyperlipidemia, and diabetes mellitus.  I last saw her in the office on 2019 at which time I recommended increasing atorvastatin for suboptimal LDL given her known coronary artery disease.  Interval history obtained from the patient and extensive review of all available medical records.    She tells me today she did increase atorvastatin to 60mg in 2019, however she had worsening of her constipation which was so severe she had to go to urgent care. She went back to 40mg.     Today, Isabel returns for follow-up and reports feeling overall well from a cardiovascular standpoint.    She does report an episode of dizziness the other day. She had left ear pain that lasted one minute at most, then she had dizziness while standing at the sink. Symptoms passed quickly. No associated chest pain, dyspnea, or palpitations. No syncope. Symptoms seem most consistent with vertigo, which she will discuss with her PCP.    She is now exercising, walking up to 5 days per week.   She denies chest pain, shortness of breath, orthopnea, PND, lower extremity edema, palpitations, or syncope.    Past Medical History:  1. Coronary artery disease   2. Hypertension  3. Hyperlipidemia  4. Diabetes mellitus  5. Osteoarthritis  6. Obstetric history: , no complications    Past Surgical History:  1. Ovarian cysts resection: Right,   2. Carpal tunnel repair: Bilateral  3. Hysterectomy:   4. Colon resection secondary to complication of hysterectomy:   5. Total knee arthroplasty: Right,   6. Trigger finger  release    Medications: Aspirin 81 mg daily, atorvastatin 40 mg daily, metformin 500 mg twice daily, ramipril 2.5 mg daily, triamterene-HCTZ 37.5-25 mg daily, fish oil, vitamin C, D, and B.    Allergies: No known drug allergies    Social History: , Michael. 2 adult children. Former smoker, 1 pack per month, quit . Social alcohol use. Denies illicit drug use.    Family History: Reviewed and notable for unknown heart disease in her mother, who ultimately  of metastatic cancer at age 85.    Review of Systems   Constitution: Negative for malaise/fatigue and weight gain.   HENT: Negative for nosebleeds.    Eyes: Negative for vision loss in left eye, vision loss in right eye and visual disturbance.   Cardiovascular: Negative for chest pain, cyanosis, dyspnea on exertion, irregular heartbeat, leg swelling, near-syncope, orthopnea, palpitations, paroxysmal nocturnal dyspnea and syncope.   Respiratory: Negative for cough, shortness of breath, snoring and wheezing.    Endocrine: Negative for polyuria.   Hematologic/Lymphatic: Negative for bleeding problem. Does not bruise/bleed easily.   Skin: Negative for rash.   Musculoskeletal: Negative for arthritis and myalgias.   Gastrointestinal: Negative for abdominal pain, hematochezia, melena, nausea and vomiting.   Genitourinary: Negative for hematuria.   Neurological: Positive for dizziness. Negative for light-headedness.   Psychiatric/Behavioral: Negative for altered mental status and depression.       Exam:  Objective   Vitals:    21 0919   BP: 120/78   Pulse: 92   SpO2: 97%     Body mass index is 36.91 kg/m².  Physical Exam   Constitutional: She appears well-developed and well-nourished. No distress.   HENT:   Head: Normocephalic.   Mouth/Throat: Mucous membranes are normal. Mucous membranes are not cyanotic.   Eyes: Conjunctivae are normal.   Neck: No JVD present. Carotid bruit is not present.   Cardiovascular: Normal rate, regular rhythm, S1 normal and S2  "normal.  No extrasystoles are present. Exam reveals no gallop.   No murmur heard.  Pulses:       Carotid pulses are 2+ on the right side and 2+ on the left side.       Radial pulses are 2+ on the right side and 2+ on the left side.   Pulmonary/Chest: Effort normal. No respiratory distress. She has no wheezes. She has no rales.   Abdominal: Soft. Bowel sounds are normal. There is no abdominal tenderness.   Musculoskeletal:         General: No edema.   Lymphadenopathy:     She has no cervical adenopathy.   Neurological: She is alert.   Skin: Skin is warm and dry. No cyanosis.   Psychiatric: She has a normal mood and affect. Her speech is normal.       Labs:  Lab Results   Component Value Date    WBC 7.4 01/29/2021    HGB 14.3 01/29/2021     01/29/2021    CHOL 128 01/29/2021    TRIG 125 01/29/2021    HDL 46 (L) 01/29/2021    ALT 23 01/29/2021    AST 20 01/29/2021     01/29/2021    K 4.6 01/29/2021    CREATININE 0.66 01/29/2021    TSH 1.40 12/05/2016    HGBA1C 5.7 (H) 01/29/2021    MICROALBUR 1.0 12/05/2016     Personally reviewed, my interpretation: LDL 61.  Acceptable glucose control given diabetes.  Normal renal and liver function.    Cardiovascular Studies:   1.  Coronary calcium score 4/2/18: Composite 562.  Left main 0.  .  .  LCx 169. Composite 562 is between the 95 and 100 percentile for females between the ages of 55 and 64, as per [employer] 2006 Database. This correlates with \"extensive atherosclerotic plaque\" with \"high likelihood of at least one significant coronary narrowing\".    ECG from today personally reviewed and discussed with the patient shows sinus rhythm, low voltage.    Assessment/Plan   Problem List Items Addressed This Visit        Circulatory    Coronary artery disease involving native coronary artery of native heart without angina pectoris - Primary     Coronary calcium score 4/2/18: Left main 0.  .  .  LCx 169. Composite 562 is between the 95 and 100 " percentile for females her age.  Asymptomatic.  --Continue aspirin and high intensity statin.  --Lifestyle modification discussed at length, particularly recommending improve dietary habits, increase regular aerobic activity, and weight loss.         Relevant Orders    ECG 12 lead (Completed)    Essential hypertension     Blood pressure acceptable, measuring 120/78 mmHg on my manual reading.   --Continue current regimen: Ramipril 2.5 mg daily and triamterene-HCTZ.   --Goal blood pressure less than 130/80 mmHg.  --Lifestyle modification discussed, encouraged increased exercise which she has started.            Endocrine/Metabolic    Mixed hyperlipidemia     From 1/29/2021: Total cholesterol 128, triglycerides 125, HDL 46, LDL 61.  Low HDL with acceptable LDL.  Severe constipation with higher dose atorvastatin.   Comorbidities: Coronary atherosclerosis and diabetes mellitus.  --Continue high intensity statin: Atorvastatin 40 mg daily.  --Goal LDL less than 70 given comorbidities.  --Lifestyle modification discussed at length.                  This letter was generated using speech recognition software. Please excuse any typographical errors.    Return in about 1 year (around 3/4/2022).        Elaina Daniel MD, FACC

## 2021-03-30 RX ORDER — AMOXICILLIN 500 MG/1
2000 TABLET, FILM COATED ORAL ONCE
Qty: 4 TABLET | Refills: 0 | Status: SHIPPED | OUTPATIENT
Start: 2021-03-30 | End: 2021-03-30

## 2021-03-30 NOTE — TELEPHONE ENCOUNTER
Medicine Refill Request    Last Office Visit: 1/29/2021  Last Telemedicine Visit: Visit date not found    Next Office Visit: 7/9/2021  Next Telemedicine Visit: Visit date not found         Current Outpatient Medications:   •  ascorbic acid (VITAMIN C) 250 mg tablet, Take 250 mg by mouth daily., Disp: , Rfl:   •  aspirin 81 mg enteric coated tablet, Take 81 mg by mouth daily., Disp: , Rfl:   •  atorvastatin (LIPITOR) 40 mg tablet, Take 1 tablet (40 mg total) by mouth once daily., Disp: 90 tablet, Rfl: 1  •  b complex vitamins capsule, Take 1 capsule by mouth daily., Disp: , Rfl:   •  ergocalciferol, vitamin D2, (VITAMIN D2 ORAL), Take by mouth., Disp: , Rfl:   •  hydrocortisone 2.5 % cream, Apply topically 2 (two) times a day., Disp: 28.35 g, Rfl: 1  •  hydrocortisone 2.5 % cream, Apply topically 2 (two) times a day., Disp: 30 g, Rfl: 1  •  LINZESS 290 mcg capsule, Take 1 capsule (290 mcg total) by mouth daily before breakfast., Disp: 90 capsule, Rfl: 1  •  LORazepam (ATIVAN) 1 mg tablet, Take 1 tablet by mouth 3 (three) times a day., Disp: , Rfl:   •  metFORMIN (GLUCOPHAGE) 500 mg tablet, TAKE 1 TABLET BY MOUTH EVERY DAY, Disp: 90 tablet, Rfl: 1  •  nystatin-triamcinolone (MYCOLOG II) 100,000-0.1 unit/g-% cream, Apply topically daily., Disp: , Rfl:   •  omega-3 fatty acids-fish oil (omega-3) 360-1,200 mg capsule, Take 1 capsule by mouth daily., Disp: , Rfl:   •  penciclovir (DENAVIR) 1 % cream, Apply topically 4 (four) times a day., Disp: 1.5 g, Rfl: 1  •  ramipriL (ALTACE) 2.5 mg capsule, TAKE 1 CAPSULE BY MOUTH EVERY DAY, Disp: 90 capsule, Rfl: 1  •  triamterene-hydrochlorothiazid (DYAZIDE) 37.5-25 mg per capsule, Take 1 capsule by mouth every morning. (Patient taking differently: Take 1 capsule by mouth 2 (two) times a week (Mon, Thu).  ), Disp: 90 capsule, Rfl: 1      BP Readings from Last 3 Encounters:   03/04/21 120/78   01/29/21 132/82   07/30/20 110/60       Recent Lab results:  Lab Results   Component Value  Date    CHOL 128 01/29/2021   ,   Lab Results   Component Value Date    HDL 46 (L) 01/29/2021   ,   Lab Results   Component Value Date    LDLCALC 61 01/29/2021   ,   Lab Results   Component Value Date    TRIG 125 01/29/2021        Lab Results   Component Value Date    GLUCOSE 110 (H) 01/29/2021   ,   Lab Results   Component Value Date    HGBA1C 5.7 (H) 01/29/2021         Lab Results   Component Value Date    CREATININE 0.66 01/29/2021       Lab Results   Component Value Date    TSH 1.40 12/05/2016

## 2021-03-30 NOTE — TELEPHONE ENCOUNTER
Patient contacted office to request prescription refill:    Name of medication:amoxil  Strength of medication:500mg  SI tablet 3x day for 10 days  Quantity:30  Requested number refills:  Preferred Pharmacy:[facility]  Pharmacy number:[id number]

## 2021-05-10 RX ORDER — ATORVASTATIN CALCIUM 20 MG/1
TABLET, FILM COATED ORAL
Qty: 90 TABLET | Refills: 1 | OUTPATIENT
Start: 2021-05-10

## 2021-05-11 RX ORDER — ATORVASTATIN CALCIUM 40 MG/1
40 TABLET, FILM COATED ORAL
Qty: 90 TABLET | Refills: 1 | Status: SHIPPED | OUTPATIENT
Start: 2021-05-11 | End: 2021-08-21 | Stop reason: SDUPTHER

## 2021-06-25 ENCOUNTER — TRANSCRIBE ORDERS (OUTPATIENT)
Dept: SCHEDULING | Age: 66
End: 2021-06-25

## 2021-06-25 DIAGNOSIS — Z12.31 ENCOUNTER FOR SCREENING MAMMOGRAM FOR MALIGNANT NEOPLASM OF BREAST: Primary | ICD-10-CM

## 2021-07-06 ENCOUNTER — HOSPITAL ENCOUNTER (OUTPATIENT)
Dept: RADIOLOGY | Facility: CLINIC | Age: 66
Discharge: HOME | End: 2021-07-06
Attending: OBSTETRICS & GYNECOLOGY
Payer: COMMERCIAL

## 2021-07-06 DIAGNOSIS — Z12.31 ENCOUNTER FOR SCREENING MAMMOGRAM FOR MALIGNANT NEOPLASM OF BREAST: ICD-10-CM

## 2021-07-06 PROCEDURE — 77063 BREAST TOMOSYNTHESIS BI: CPT

## 2021-07-09 ENCOUNTER — OFFICE VISIT (OUTPATIENT)
Dept: FAMILY MEDICINE | Facility: CLINIC | Age: 66
End: 2021-07-09
Payer: COMMERCIAL

## 2021-07-09 VITALS
SYSTOLIC BLOOD PRESSURE: 124 MMHG | OXYGEN SATURATION: 97 % | DIASTOLIC BLOOD PRESSURE: 72 MMHG | RESPIRATION RATE: 16 BRPM | BODY MASS INDEX: 37.3 KG/M2 | HEIGHT: 60 IN | TEMPERATURE: 97.6 F | WEIGHT: 190 LBS | HEART RATE: 81 BPM

## 2021-07-09 DIAGNOSIS — E11.69 DM TYPE 2 WITH DIABETIC MIXED HYPERLIPIDEMIA (CMS/HCC)  (CMS/HCC): Primary | ICD-10-CM

## 2021-07-09 DIAGNOSIS — I10 ESSENTIAL HYPERTENSION: ICD-10-CM

## 2021-07-09 DIAGNOSIS — E78.2 MIXED HYPERLIPIDEMIA: ICD-10-CM

## 2021-07-09 DIAGNOSIS — E78.2 DM TYPE 2 WITH DIABETIC MIXED HYPERLIPIDEMIA (CMS/HCC)  (CMS/HCC): Primary | ICD-10-CM

## 2021-07-09 DIAGNOSIS — E66.01 SEVERE OBESITY WITH BODY MASS INDEX (BMI) OF 36.0 TO 36.9 WITH SERIOUS COMORBIDITY (CMS/HCC): ICD-10-CM

## 2021-07-09 PROCEDURE — 3008F BODY MASS INDEX DOCD: CPT | Performed by: INTERNAL MEDICINE

## 2021-07-09 PROCEDURE — 99214 OFFICE O/P EST MOD 30 MIN: CPT | Performed by: INTERNAL MEDICINE

## 2021-07-09 RX ORDER — AMOXICILLIN 500 MG/1
CAPSULE ORAL
COMMUNITY
Start: 2021-05-22

## 2021-07-09 ASSESSMENT — PATIENT HEALTH QUESTIONNAIRE - PHQ9: SUM OF ALL RESPONSES TO PHQ9 QUESTIONS 1 & 2: 0

## 2021-07-09 NOTE — PROGRESS NOTES
Isabel Spivey is a 65 y.o. female     66 y/o female presents for follow up   DM Tolerating meds   Any vision changes none   Last Ophtho yearly  Dietary changes - none   Numbness/paresthesias - none    HTN - no headaches, dizziness, lightheadedness, CP  Checking at home   Tolerating meds without side effects     Lipids at goal       Past Medical History:   Diagnosis Date   • Arthritis    • Hypertension    • Lipid disorder    • Type 2 diabetes mellitus (CMS/HCC)        Past Surgical History:   Procedure Laterality Date   • CARPAL TUNNEL RELEASE Bilateral    • COLON SURGERY     • HYSTERECTOMY      total   • JOINT REPLACEMENT Right     knee   • OOPHORECTOMY Right    • TRIGGER FINGER RELEASE Right     middle finger        Social History     Socioeconomic History   • Marital status:      Spouse name: None   • Number of children: None   • Years of education: None   • Highest education level: None   Occupational History   • None   Tobacco Use   • Smoking status: Former Smoker     Quit date:      Years since quittin.5   • Smokeless tobacco: Never Used   Substance and Sexual Activity   • Alcohol use: Yes     Comment: ocassionally   • Drug use: No   • Sexual activity: Yes     Partners: Male   Other Topics Concern   • None   Social History Narrative   • None     Social Determinants of Health     Financial Resource Strain:    • Difficulty of Paying Living Expenses:    Food Insecurity:    • Worried About Running Out of Food in the Last Year:    • Ran Out of Food in the Last Year:    Transportation Needs:    • Lack of Transportation (Medical):    • Lack of Transportation (Non-Medical):    Physical Activity:    • Days of Exercise per Week:    • Minutes of Exercise per Session:    Stress:    • Feeling of Stress :    Social Connections:    • Frequency of Communication with Friends and Family:    • Frequency of Social Gatherings with Friends and Family:    • Attends Judaism Services:    • Active Member of Clubs or  Organizations:    • Attends Club or Organization Meetings:    • Marital Status:    Intimate Partner Violence:    • Fear of Current or Ex-Partner:    • Emotionally Abused:    • Physically Abused:    • Sexually Abused:        History reviewed. No pertinent family history.    No known drug allergies    Current Outpatient Medications   Medication Sig Dispense Refill   • amoxicillin (AMOXIL) 500 mg capsule TAKE 4 CAPSULES BY MOUTH ONE HOUR PRIOR TO DENTAL APPOINTMENT     • ascorbic acid (VITAMIN C) 250 mg tablet Take 250 mg by mouth daily.     • aspirin 81 mg enteric coated tablet Take 81 mg by mouth daily.     • atorvastatin (LIPITOR) 40 mg tablet Take 1 tablet (40 mg total) by mouth once daily. 90 tablet 1   • b complex vitamins capsule Take 1 capsule by mouth daily.     • ergocalciferol, vitamin D2, (VITAMIN D2 ORAL) Take by mouth.     • hydrocortisone 2.5 % cream Apply topically 2 (two) times a day. 28.35 g 1   • LINZESS 290 mcg capsule Take 1 capsule (290 mcg total) by mouth daily before breakfast. 90 capsule 1   • LORazepam (ATIVAN) 1 mg tablet Take 1 tablet by mouth 3 (three) times a day.     • metFORMIN (GLUCOPHAGE) 500 mg tablet TAKE 1 TABLET BY MOUTH EVERY DAY 90 tablet 1   • nystatin-triamcinolone (MYCOLOG II) 100,000-0.1 unit/g-% cream Apply topically daily.     • omega-3 fatty acids-fish oil (omega-3) 360-1,200 mg capsule Take 1 capsule by mouth daily.     • penciclovir (DENAVIR) 1 % cream Apply topically 4 (four) times a day. 1.5 g 1   • ramipriL (ALTACE) 2.5 mg capsule TAKE 1 CAPSULE BY MOUTH EVERY DAY 90 capsule 1   • triamterene-hydrochlorothiazid (DYAZIDE) 37.5-25 mg per capsule Take 1 capsule by mouth every morning. (Patient taking differently: Take 1 capsule by mouth 2 (two) times a week (Mon, Thu).  ) 90 capsule 1   • hydrocortisone 2.5 % cream Apply topically 2 (two) times a day. 30 g 1     No current facility-administered medications for this visit.       Review of Systems   All other systems  reviewed and are negative.         Vitals:    07/09/21 0914   BP: 124/72   Pulse: 81   Resp: 16   Temp: 36.4 °C (97.6 °F)   SpO2: 97%       Body mass index is 37.11 kg/m².      Physical Exam  Constitutional:       Appearance: Normal appearance.   Cardiovascular:      Rate and Rhythm: Normal rate and regular rhythm.      Pulses: Normal pulses.      Heart sounds: Normal heart sounds. No murmur heard.     Pulmonary:      Effort: Pulmonary effort is normal.      Breath sounds: Normal breath sounds. No wheezing or rhonchi.   Neurological:      Mental Status: She is alert.          Assessment/Plan      Diagnoses and all orders for this visit:    DM type 2 with diabetic mixed hyperlipidemia (CMS/HCC) (Primary)    Severe obesity with body mass index (BMI) of 36.0 to 36.9 with serious comorbidity (CMS/HCC)    Mixed hyperlipidemia    Essential hypertension          New Medications Ordered This Visit   Medications   • amoxicillin (AMOXIL) 500 mg capsule     Sig: TAKE 4 CAPSULES BY MOUTH ONE HOUR PRIOR TO DENTAL APPOINTMENT       There are no discontinued medications.     No orders of the defined types were placed in this encounter.       John Bowens,   7/9/2021

## 2021-07-12 LAB
ALBUMIN SERPL-MCNC: 4.3 G/DL (ref 3.6–5.1)
ALBUMIN/CREAT UR: 3 MCG/MG CREAT
ALBUMIN/GLOB SERPL: 1.5 (CALC) (ref 1–2.5)
ALP SERPL-CCNC: 110 U/L (ref 37–153)
ALT SERPL-CCNC: 20 U/L (ref 6–29)
AST SERPL-CCNC: 18 U/L (ref 10–35)
BASOPHILS # BLD AUTO: 59 CELLS/UL (ref 0–200)
BASOPHILS NFR BLD AUTO: 1 %
BILIRUB SERPL-MCNC: 0.6 MG/DL (ref 0.2–1.2)
BUN SERPL-MCNC: 20 MG/DL (ref 7–25)
BUN/CREAT SERPL: ABNORMAL (CALC) (ref 6–22)
CALCIUM SERPL-MCNC: 10 MG/DL (ref 8.6–10.4)
CHLORIDE SERPL-SCNC: 104 MMOL/L (ref 98–110)
CHOLEST SERPL-MCNC: 148 MG/DL
CHOLEST/HDLC SERPL: 2.8 (CALC)
CO2 SERPL-SCNC: 29 MMOL/L (ref 20–32)
CREAT SERPL-MCNC: 0.77 MG/DL (ref 0.5–0.99)
CREAT UR-MCNC: 71 MG/DL (ref 20–275)
EOSINOPHIL # BLD AUTO: 159 CELLS/UL (ref 15–500)
EOSINOPHIL NFR BLD AUTO: 2.7 %
ERYTHROCYTE [DISTWIDTH] IN BLOOD BY AUTOMATED COUNT: 12.7 % (ref 11–15)
GLOBULIN SER CALC-MCNC: 2.8 G/DL (CALC) (ref 1.9–3.7)
GLUCOSE SERPL-MCNC: 118 MG/DL (ref 65–99)
HBA1C MFR BLD: 5.8 % OF TOTAL HGB
HCT VFR BLD AUTO: 43.4 % (ref 35–45)
HDLC SERPL-MCNC: 53 MG/DL
HGB BLD-MCNC: 14.6 G/DL (ref 11.7–15.5)
LDLC SERPL CALC-MCNC: 73 MG/DL (CALC)
LYMPHOCYTES # BLD AUTO: 1623 CELLS/UL (ref 850–3900)
LYMPHOCYTES NFR BLD AUTO: 27.5 %
MCH RBC QN AUTO: 30.2 PG (ref 27–33)
MCHC RBC AUTO-ENTMCNC: 33.6 G/DL (ref 32–36)
MCV RBC AUTO: 89.7 FL (ref 80–100)
MICROALBUMIN UR-MCNC: 0.2 MG/DL
MONOCYTES # BLD AUTO: 543 CELLS/UL (ref 200–950)
MONOCYTES NFR BLD AUTO: 9.2 %
NEUTROPHILS # BLD AUTO: 3516 CELLS/UL (ref 1500–7800)
NEUTROPHILS NFR BLD AUTO: 59.6 %
NONHDLC SERPL-MCNC: 95 MG/DL (CALC)
PLATELET # BLD AUTO: 238 THOUSAND/UL (ref 140–400)
PMV BLD REES-ECKER: 10.2 FL (ref 7.5–12.5)
POTASSIUM SERPL-SCNC: 5.2 MMOL/L (ref 3.5–5.3)
PROT SERPL-MCNC: 7.1 G/DL (ref 6.1–8.1)
QUEST EGFR AFRICAN AMERICAN: 93 ML/MIN/1.73M2
QUEST EGFR NON-AFR. AMERICAN: 80 ML/MIN/1.73M2
RBC # BLD AUTO: 4.84 MILLION/UL (ref 3.8–5.1)
SODIUM SERPL-SCNC: 140 MMOL/L (ref 135–146)
TRIGL SERPL-MCNC: 135 MG/DL
WBC # BLD AUTO: 5.9 THOUSAND/UL (ref 3.8–10.8)

## 2021-07-15 ENCOUNTER — TELEPHONE (OUTPATIENT)
Dept: FAMILY MEDICINE | Facility: CLINIC | Age: 66
End: 2021-07-15

## 2021-08-23 RX ORDER — RAMIPRIL 2.5 MG/1
2.5 CAPSULE ORAL
Qty: 90 CAPSULE | Refills: 1 | Status: SHIPPED | OUTPATIENT
Start: 2021-08-23 | End: 2022-01-11 | Stop reason: SDUPTHER

## 2021-08-23 RX ORDER — METFORMIN HYDROCHLORIDE 500 MG/1
TABLET ORAL
Qty: 90 TABLET | Refills: 1 | Status: SHIPPED | OUTPATIENT
Start: 2021-08-23 | End: 2022-01-11

## 2021-08-23 RX ORDER — LINACLOTIDE 290 UG/1
290 CAPSULE, GELATIN COATED ORAL
Qty: 90 CAPSULE | Refills: 1 | Status: SHIPPED | OUTPATIENT
Start: 2021-08-23 | End: 2023-01-17 | Stop reason: SDUPTHER

## 2021-08-23 RX ORDER — ATORVASTATIN CALCIUM 40 MG/1
40 TABLET, FILM COATED ORAL
Qty: 90 TABLET | Refills: 1 | Status: SHIPPED | OUTPATIENT
Start: 2021-08-23 | End: 2022-01-11 | Stop reason: SDUPTHER

## 2021-08-23 NOTE — TELEPHONE ENCOUNTER
Medicine Refill Request    Last Office Visit: 7/9/2021  Last Telemedicine Visit: Visit date not found    Next Office Visit: 1/11/2022  Next Telemedicine Visit: Visit date not found         Current Outpatient Medications:   •  amoxicillin (AMOXIL) 500 mg capsule, TAKE 4 CAPSULES BY MOUTH ONE HOUR PRIOR TO DENTAL APPOINTMENT, Disp: , Rfl:   •  ascorbic acid (VITAMIN C) 250 mg tablet, Take 250 mg by mouth daily., Disp: , Rfl:   •  aspirin 81 mg enteric coated tablet, Take 81 mg by mouth daily., Disp: , Rfl:   •  atorvastatin (LIPITOR) 40 mg tablet, Take 1 tablet (40 mg total) by mouth once daily., Disp: 90 tablet, Rfl: 1  •  b complex vitamins capsule, Take 1 capsule by mouth daily., Disp: , Rfl:   •  ergocalciferol, vitamin D2, (VITAMIN D2 ORAL), Take by mouth., Disp: , Rfl:   •  hydrocortisone 2.5 % cream, Apply topically 2 (two) times a day., Disp: 28.35 g, Rfl: 1  •  hydrocortisone 2.5 % cream, Apply topically 2 (two) times a day., Disp: 30 g, Rfl: 1  •  LINZESS 290 mcg capsule, Take 1 capsule (290 mcg total) by mouth daily before breakfast., Disp: 90 capsule, Rfl: 1  •  LORazepam (ATIVAN) 1 mg tablet, Take 1 tablet by mouth 3 (three) times a day., Disp: , Rfl:   •  metFORMIN (GLUCOPHAGE) 500 mg tablet, TAKE 1 TABLET BY MOUTH EVERY DAY, Disp: 90 tablet, Rfl: 1  •  nystatin-triamcinolone (MYCOLOG II) 100,000-0.1 unit/g-% cream, Apply topically daily., Disp: , Rfl:   •  omega-3 fatty acids-fish oil (omega-3) 360-1,200 mg capsule, Take 1 capsule by mouth daily., Disp: , Rfl:   •  penciclovir (DENAVIR) 1 % cream, Apply topically 4 (four) times a day., Disp: 1.5 g, Rfl: 1  •  ramipriL (ALTACE) 2.5 mg capsule, TAKE 1 CAPSULE BY MOUTH EVERY DAY, Disp: 90 capsule, Rfl: 1  •  triamterene-hydrochlorothiazid (DYAZIDE) 37.5-25 mg per capsule, Take 1 capsule by mouth every morning. (Patient taking differently: Take 1 capsule by mouth 2 (two) times a week (Mon, Thu).  ), Disp: 90 capsule, Rfl: 1      BP Readings from Last 3  Encounters:   07/09/21 124/72   03/04/21 120/78   01/29/21 132/82       Recent Lab results:  Lab Results   Component Value Date    CHOL 148 07/09/2021   ,   Lab Results   Component Value Date    HDL 53 07/09/2021   ,   Lab Results   Component Value Date    LDLCALC 73 07/09/2021   ,   Lab Results   Component Value Date    TRIG 135 07/09/2021        Lab Results   Component Value Date    GLUCOSE 118 (H) 07/09/2021   ,   Lab Results   Component Value Date    HGBA1C 5.8 (H) 07/09/2021         Lab Results   Component Value Date    CREATININE 0.77 07/09/2021       Lab Results   Component Value Date    TSH 1.40 12/05/2016

## 2022-01-11 ENCOUNTER — OFFICE VISIT (OUTPATIENT)
Dept: FAMILY MEDICINE | Facility: CLINIC | Age: 67
End: 2022-01-11
Payer: COMMERCIAL

## 2022-01-11 VITALS
TEMPERATURE: 96.9 F | RESPIRATION RATE: 16 BRPM | HEIGHT: 61 IN | WEIGHT: 177.8 LBS | HEART RATE: 88 BPM | DIASTOLIC BLOOD PRESSURE: 70 MMHG | OXYGEN SATURATION: 98 % | BODY MASS INDEX: 33.57 KG/M2 | SYSTOLIC BLOOD PRESSURE: 102 MMHG

## 2022-01-11 DIAGNOSIS — E11.69 DM TYPE 2 WITH DIABETIC MIXED HYPERLIPIDEMIA (CMS/HCC)  (CMS/HCC): Primary | ICD-10-CM

## 2022-01-11 DIAGNOSIS — E78.2 DM TYPE 2 WITH DIABETIC MIXED HYPERLIPIDEMIA (CMS/HCC)  (CMS/HCC): Primary | ICD-10-CM

## 2022-01-11 DIAGNOSIS — I25.10 CORONARY ARTERY DISEASE INVOLVING NATIVE CORONARY ARTERY OF NATIVE HEART WITHOUT ANGINA PECTORIS: ICD-10-CM

## 2022-01-11 DIAGNOSIS — I10 ESSENTIAL HYPERTENSION: ICD-10-CM

## 2022-01-11 DIAGNOSIS — E78.2 MIXED HYPERLIPIDEMIA: ICD-10-CM

## 2022-01-11 PROBLEM — K59.04 FUNCTIONAL CONSTIPATION: Status: ACTIVE | Noted: 2022-01-11

## 2022-01-11 PROCEDURE — 3008F BODY MASS INDEX DOCD: CPT | Performed by: INTERNAL MEDICINE

## 2022-01-11 PROCEDURE — 99214 OFFICE O/P EST MOD 30 MIN: CPT | Performed by: INTERNAL MEDICINE

## 2022-01-11 RX ORDER — ATORVASTATIN CALCIUM 40 MG/1
40 TABLET, FILM COATED ORAL
Qty: 90 TABLET | Refills: 1 | Status: SHIPPED | OUTPATIENT
Start: 2022-01-11 | End: 2023-01-19 | Stop reason: SDUPTHER

## 2022-01-11 RX ORDER — NYSTATIN AND TRIAMCINOLONE ACETONIDE 100000; 1 [USP'U]/G; MG/G
CREAM TOPICAL 2 TIMES DAILY PRN
Qty: 60 G | Refills: 3 | Status: SHIPPED | OUTPATIENT
Start: 2022-01-11 | End: 2023-01-19 | Stop reason: SDUPTHER

## 2022-01-11 RX ORDER — RAMIPRIL 2.5 MG/1
2.5 CAPSULE ORAL
Qty: 90 CAPSULE | Refills: 1 | Status: SHIPPED | OUTPATIENT
Start: 2022-01-11 | End: 2022-09-15

## 2022-01-11 ASSESSMENT — ENCOUNTER SYMPTOMS
PSYCHIATRIC NEGATIVE: 1
ALLERGIC/IMMUNOLOGIC NEGATIVE: 1
RESPIRATORY NEGATIVE: 1
EYES NEGATIVE: 1
CARDIOVASCULAR NEGATIVE: 1
MUSCULOSKELETAL NEGATIVE: 1
ENDOCRINE NEGATIVE: 1
HEMATOLOGIC/LYMPHATIC NEGATIVE: 1
CONSTITUTIONAL NEGATIVE: 1
GASTROINTESTINAL NEGATIVE: 1
NEUROLOGICAL NEGATIVE: 1

## 2022-01-11 ASSESSMENT — PATIENT HEALTH QUESTIONNAIRE - PHQ9: SUM OF ALL RESPONSES TO PHQ9 QUESTIONS 1 & 2: 0

## 2022-01-11 NOTE — PROGRESS NOTES
Isabel Spivey is a 66 y.o. female     65 y/o female presents for follow up   Ozermpic 12 lb weight loss     HTN - no headaches, dizziness, lightheadedness, CP  Checking at home   Tolerating meds without side effects     Lipids - tolerating statin     Constipation - linzess worked very well   Not getting covered in the past   Tried several over meds in the past without benefit    CAD ASA, Statin   Medical management         Past Medical History:   Diagnosis Date   • Arthritis    • Hypertension    • Lipid disorder    • Type 2 diabetes mellitus (CMS/HCC)        Past Surgical History:   Procedure Laterality Date   • CARPAL TUNNEL RELEASE Bilateral    • COLON SURGERY     • HYSTERECTOMY      total   • JOINT REPLACEMENT Right     knee   • OOPHORECTOMY Right    • TRIGGER FINGER RELEASE Right     middle finger        Social History     Socioeconomic History   • Marital status:      Spouse name: None   • Number of children: None   • Years of education: None   • Highest education level: None   Occupational History   • None   Tobacco Use   • Smoking status: Former Smoker     Quit date:      Years since quittin.0   • Smokeless tobacco: Never Used   Substance and Sexual Activity   • Alcohol use: Yes     Comment: ocassionally   • Drug use: No   • Sexual activity: Yes     Partners: Male   Other Topics Concern   • None   Social History Narrative   • None     Social Determinants of Health     Financial Resource Strain: Not on file   Food Insecurity: Not on file   Transportation Needs: Not on file   Physical Activity: Not on file   Stress: Not on file   Social Connections: Not on file   Intimate Partner Violence: Not on file   Housing Stability: Not on file       History reviewed. No pertinent family history.    No known drug allergies    Current Outpatient Medications   Medication Sig Dispense Refill   • amoxicillin (AMOXIL) 500 mg capsule TAKE 4 CAPSULES BY MOUTH ONE HOUR PRIOR TO DENTAL APPOINTMENT     • ascorbic  "acid (VITAMIN C) 250 mg tablet Take 250 mg by mouth daily.     • aspirin 81 mg enteric coated tablet Take 81 mg by mouth daily.     • atorvastatin (LIPITOR) 40 mg tablet Take 1 tablet (40 mg total) by mouth once daily. 90 tablet 1   • b complex vitamins capsule Take 1 capsule by mouth daily.     • ergocalciferol, vitamin D2, (VITAMIN D2 ORAL) Take by mouth.     • hydrocortisone 2.5 % cream Apply topically 2 (two) times a day. 30 g 1   • LINZESS 290 mcg capsule Take 1 capsule (290 mcg total) by mouth daily before breakfast. 90 capsule 1   • LORazepam (ATIVAN) 1 mg tablet Take 1 tablet by mouth 3 (three) times a day.     • lubiprostone (AMITIZA) 8 mcg capsule Take 1 capsule (8 mcg total) by mouth 2 (two) times a day with meals. 30 capsule 0   • metFORMIN (GLUCOPHAGE) 500 mg tablet TAKE 1 TABLET BY MOUTH EVERY DAY 90 tablet 1   • nystatin-triamcinolone (MYCOLOG II) 100,000-0.1 unit/g-% cream Apply topically daily.     • omega-3 fatty acids-fish oil (omega-3) 360-1,200 mg capsule Take 1 capsule by mouth daily.     • pen needle, diabetic (NOVOFINE PLUS) 32 gauge x 1/6\" needle Use with ozempic weekly 100 each 0   • penciclovir (DENAVIR) 1 % cream Apply topically 4 (four) times a day. 1.5 g 1   • ramipriL (ALTACE) 2.5 mg capsule Take 1 capsule (2.5 mg total) by mouth once daily. 90 capsule 1   • semaglutide 0.25 mg or 0.5 mg(2 mg/1.5 mL) pen injector Inject 0.25 mg under the skin once a week. 1.5 mL 3   • triamterene-hydrochlorothiazid (DYAZIDE) 37.5-25 mg per capsule Take 1 capsule by mouth every morning. (Patient taking differently: Take 1 capsule by mouth 2 (two) times a week (Mon, Thu).  ) 90 capsule 1   • hydrocortisone 2.5 % cream Apply topically 2 (two) times a day. 28.35 g 1     No current facility-administered medications for this visit.       Review of Systems   Constitutional: Negative.    HENT: Negative.    Eyes: Negative.    Respiratory: Negative.    Cardiovascular: Negative.    Gastrointestinal: Negative.  "   Endocrine: Negative.    Genitourinary: Negative.    Musculoskeletal: Negative.    Skin: Negative.    Allergic/Immunologic: Negative.    Neurological: Negative.    Hematological: Negative.    Psychiatric/Behavioral: Negative.    All other systems reviewed and are negative.         Vitals:    01/11/22 0902   BP: 102/70   Pulse: 88   Resp: 16   Temp: (!) 36.1 °C (96.9 °F)   SpO2: 98%       Body mass index is 34.15 kg/m².      Physical Exam  Constitutional:       Appearance: Normal appearance.   HENT:      Right Ear: Tympanic membrane, ear canal and external ear normal.      Left Ear: Tympanic membrane, ear canal and external ear normal.   Eyes:      Extraocular Movements: Extraocular movements intact.      Conjunctiva/sclera: Conjunctivae normal.      Pupils: Pupils are equal, round, and reactive to light.      Comments: anicteric no pallor         Cardiovascular:      Rate and Rhythm: Normal rate and regular rhythm.      Pulses: Normal pulses.      Heart sounds: Normal heart sounds. No murmur heard.  Pulmonary:      Effort: Pulmonary effort is normal.      Breath sounds: Normal breath sounds. No wheezing or rhonchi.   Abdominal:      General: Abdomen is flat. Bowel sounds are normal.      Palpations: Abdomen is soft. There is no mass.      Tenderness: There is no abdominal tenderness.   Musculoskeletal:      Right lower leg: No edema.      Left lower leg: No edema.   Neurological:      General: No focal deficit present.      Mental Status: She is alert and oriented to person, place, and time.   Psychiatric:         Mood and Affect: Mood normal.         Behavior: Behavior normal.          Assessment/Plan      Diagnoses and all orders for this visit:    DM type 2 with diabetic mixed hyperlipidemia (CMS/HCC) (Primary)  -     CBC and differential; Future  -     Hemoglobin A1c; Future  Doing very well on ozempic   Continue and for follow up labs   Essential hypertension  At goal   Mixed hyperlipidemia  -      Comprehensive metabolic panel; Future  -     Lipid panel; Future  Statin   Coronary artery disease involving native coronary artery of native heart without angina pectoris    Other orders  -     semaglutide 0.25 mg or 0.5 mg(2 mg/1.5 mL) pen injector; Inject 0.25 mg under the skin once a week.  -     ramipriL 2.5 mg capsule; Take 1 capsule (2.5 mg total) by mouth once daily.  -     atorvastatin 40 mg tablet; Take 1 tablet (40 mg total) by mouth once daily.  -     nystatin-triamcinolone 100,000-0.1 unit/g-% cream; Apply topically 2 (two) times a day as needed for itching or rash.          No orders of the defined types were placed in this encounter.      There are no discontinued medications.     No orders of the defined types were placed in this encounter.       John Bowens,   1/11/2022

## 2022-01-18 LAB
ALBUMIN SERPL-MCNC: 4.4 G/DL (ref 3.6–5.1)
ALBUMIN/GLOB SERPL: 1.6 (CALC) (ref 1–2.5)
ALP SERPL-CCNC: 108 U/L (ref 37–153)
ALT SERPL-CCNC: 24 U/L (ref 6–29)
AST SERPL-CCNC: 22 U/L (ref 10–35)
BASOPHILS # BLD AUTO: 74 CELLS/UL (ref 0–200)
BASOPHILS NFR BLD AUTO: 1.1 %
BILIRUB SERPL-MCNC: 0.6 MG/DL (ref 0.2–1.2)
BUN SERPL-MCNC: 16 MG/DL (ref 7–25)
BUN/CREAT SERPL: NORMAL (CALC) (ref 6–22)
CALCIUM SERPL-MCNC: 9.9 MG/DL (ref 8.6–10.4)
CHLORIDE SERPL-SCNC: 103 MMOL/L (ref 98–110)
CHOLEST SERPL-MCNC: 139 MG/DL
CHOLEST/HDLC SERPL: 3.1 (CALC)
CO2 SERPL-SCNC: 29 MMOL/L (ref 20–32)
CREAT SERPL-MCNC: 0.68 MG/DL (ref 0.6–0.93)
EOSINOPHIL # BLD AUTO: 235 CELLS/UL (ref 15–500)
EOSINOPHIL NFR BLD AUTO: 3.5 %
ERYTHROCYTE [DISTWIDTH] IN BLOOD BY AUTOMATED COUNT: 12.3 % (ref 11–15)
GLOBULIN SER CALC-MCNC: 2.8 G/DL (CALC) (ref 1.9–3.7)
GLUCOSE SERPL-MCNC: 94 MG/DL (ref 65–99)
HBA1C MFR BLD: 5.3 % OF TOTAL HGB
HCT VFR BLD AUTO: 44.4 % (ref 35–45)
HDLC SERPL-MCNC: 45 MG/DL
HGB BLD-MCNC: 14.8 G/DL (ref 11.7–15.5)
LDLC SERPL CALC-MCNC: 71 MG/DL (CALC)
LYMPHOCYTES # BLD AUTO: 1642 CELLS/UL (ref 850–3900)
LYMPHOCYTES NFR BLD AUTO: 24.5 %
MCH RBC QN AUTO: 28.9 PG (ref 27–33)
MCHC RBC AUTO-ENTMCNC: 33.3 G/DL (ref 32–36)
MCV RBC AUTO: 86.7 FL (ref 80–100)
MONOCYTES # BLD AUTO: 603 CELLS/UL (ref 200–950)
MONOCYTES NFR BLD AUTO: 9 %
NEUTROPHILS # BLD AUTO: 4147 CELLS/UL (ref 1500–7800)
NEUTROPHILS NFR BLD AUTO: 61.9 %
NONHDLC SERPL-MCNC: 94 MG/DL (CALC)
PLATELET # BLD AUTO: 272 THOUSAND/UL (ref 140–400)
PMV BLD REES-ECKER: 10.2 FL (ref 7.5–12.5)
POTASSIUM SERPL-SCNC: 4.1 MMOL/L (ref 3.5–5.3)
PROT SERPL-MCNC: 7.2 G/DL (ref 6.1–8.1)
QUEST EGFR AFRICAN AMERICAN: 103 ML/MIN/1.73M2
QUEST EGFR NON-AFR. AMERICAN: 89 ML/MIN/1.73M2
RBC # BLD AUTO: 5.12 MILLION/UL (ref 3.8–5.1)
SODIUM SERPL-SCNC: 140 MMOL/L (ref 135–146)
TRIGL SERPL-MCNC: 148 MG/DL
WBC # BLD AUTO: 6.7 THOUSAND/UL (ref 3.8–10.8)

## 2022-01-24 ENCOUNTER — TELEPHONE (OUTPATIENT)
Dept: FAMILY MEDICINE | Facility: CLINIC | Age: 67
End: 2022-01-24
Payer: COMMERCIAL

## 2022-02-09 NOTE — TELEPHONE ENCOUNTER
"Medicine Refill Request    Last Office: 1/11/2022   Last Consult Visit: Visit date not found  Last Telemedicine Visit: Visit date not found    Next Appointment: 7/15/2022      Current Outpatient Medications:   •  amoxicillin (AMOXIL) 500 mg capsule, TAKE 4 CAPSULES BY MOUTH ONE HOUR PRIOR TO DENTAL APPOINTMENT, Disp: , Rfl:   •  ascorbic acid (VITAMIN C) 250 mg tablet, Take 250 mg by mouth daily., Disp: , Rfl:   •  aspirin 81 mg enteric coated tablet, Take 81 mg by mouth daily., Disp: , Rfl:   •  atorvastatin 40 mg tablet, Take 1 tablet (40 mg total) by mouth once daily., Disp: 90 tablet, Rfl: 1  •  b complex vitamins capsule, Take 1 capsule by mouth daily., Disp: , Rfl:   •  ergocalciferol, vitamin D2, (VITAMIN D2 ORAL), Take by mouth., Disp: , Rfl:   •  hydrocortisone 2.5 % cream, Apply topically 2 (two) times a day., Disp: 28.35 g, Rfl: 1  •  hydrocortisone 2.5 % cream, Apply topically 2 (two) times a day., Disp: 30 g, Rfl: 1  •  LINZESS 290 mcg capsule, Take 1 capsule (290 mcg total) by mouth daily before breakfast., Disp: 90 capsule, Rfl: 1  •  LORazepam (ATIVAN) 1 mg tablet, Take 1 tablet by mouth 3 (three) times a day., Disp: , Rfl:   •  lubiprostone (AMITIZA) 8 mcg capsule, Take 1 capsule (8 mcg total) by mouth 2 (two) times a day with meals., Disp: 30 capsule, Rfl: 0  •  nystatin-triamcinolone 100,000-0.1 unit/g-% cream, Apply topically 2 (two) times a day as needed for itching or rash., Disp: 60 g, Rfl: 3  •  omega-3 fatty acids-fish oil (omega-3) 360-1,200 mg capsule, Take 1 capsule by mouth daily., Disp: , Rfl:   •  pen needle, diabetic (NOVOFINE PLUS) 32 gauge x 1/6\" needle, Use with ozempic weekly, Disp: 100 each, Rfl: 0  •  penciclovir (DENAVIR) 1 % cream, Apply topically 4 (four) times a day., Disp: 1.5 g, Rfl: 1  •  ramipriL 2.5 mg capsule, Take 1 capsule (2.5 mg total) by mouth once daily., Disp: 90 capsule, Rfl: 1  •  semaglutide 0.25 mg or 0.5 mg(2 mg/1.5 mL) pen injector, Inject 0.25 mg under " the skin once a week., Disp: 1.5 mL, Rfl: 3  •  triamterene-hydrochlorothiazid (DYAZIDE) 37.5-25 mg per capsule, Take 1 capsule by mouth every morning. (Patient taking differently: Take 1 capsule by mouth 2 (two) times a week (Mon, Thu).  ), Disp: 90 capsule, Rfl: 1      BP Readings from Last 3 Encounters:   01/11/22 102/70   07/09/21 124/72   03/04/21 120/78       Recent Lab results:  Lab Results   Component Value Date    CHOL 139 01/18/2022   ,   Lab Results   Component Value Date    HDL 45 (L) 01/18/2022   ,   Lab Results   Component Value Date    LDLCALC 71 01/18/2022   ,   Lab Results   Component Value Date    TRIG 148 01/18/2022        Lab Results   Component Value Date    GLUCOSE 94 01/18/2022   ,   Lab Results   Component Value Date    HGBA1C 5.3 01/18/2022         Lab Results   Component Value Date    CREATININE 0.68 01/18/2022       Lab Results   Component Value Date    TSH 1.40 12/05/2016

## 2022-02-10 RX ORDER — LUBIPROSTONE 8 UG/1
8 CAPSULE ORAL 2 TIMES DAILY WITH MEALS
Qty: 30 CAPSULE | Refills: 0 | Status: SHIPPED | OUTPATIENT
Start: 2022-02-10 | End: 2023-01-17

## 2022-03-28 ENCOUNTER — TELEPHONE (OUTPATIENT)
Dept: FAMILY MEDICINE | Facility: CLINIC | Age: 67
End: 2022-03-28
Payer: COMMERCIAL

## 2022-03-28 RX ORDER — PROMETHAZINE HYDROCHLORIDE AND DEXTROMETHORPHAN HYDROBROMIDE 6.25; 15 MG/5ML; MG/5ML
5 SYRUP ORAL EVERY 4 HOURS PRN
Qty: 240 ML | Refills: 0 | Status: SHIPPED | OUTPATIENT
Start: 2022-03-28 | End: 2022-04-07

## 2022-03-28 NOTE — TELEPHONE ENCOUNTER
RN spoke to patient. Reports symptoms since Sat. Sinus pressure and congestion. Cough due to post nasal drip. Denies fever. Not interested in Covid test. Discussed with dano Chan to send promethazine for nighttime use only. Advised OTC cough syrup, humidifier, Flonase. Patient advised to call office if symptoms do not improve.

## 2022-03-28 NOTE — TELEPHONE ENCOUNTER
----- Message from Isha Mansfield MA sent at 3/28/2022 10:55 AM EDT -----  Regarding: FW: Head cold    ----- Message -----  From: Isabel Spivey  Sent: 3/27/2022   9:02 PM EDT  To: [facility]  Subject: Head cold                                        Apparently Michael gave me his head cold, runny nose, sinus pressure, breathing thru my mouth which makes my throat sore. I know I shouldn’t have taken his medicine but I needed to get some sleep since I didn’t sleep good Saturday nite.     Will you prescribe the same medicine for me promethazine?    Thank you   Qing

## 2022-04-28 ENCOUNTER — OFFICE VISIT (OUTPATIENT)
Dept: CARDIOLOGY | Facility: CLINIC | Age: 67
End: 2022-04-28
Payer: COMMERCIAL

## 2022-04-28 VITALS
OXYGEN SATURATION: 99 % | HEIGHT: 61 IN | BODY MASS INDEX: 32.28 KG/M2 | HEART RATE: 88 BPM | WEIGHT: 171 LBS | DIASTOLIC BLOOD PRESSURE: 78 MMHG | SYSTOLIC BLOOD PRESSURE: 124 MMHG

## 2022-04-28 DIAGNOSIS — E66.811 CLASS 1 OBESITY DUE TO EXCESS CALORIES WITH SERIOUS COMORBIDITY AND BODY MASS INDEX (BMI) OF 32.0 TO 32.9 IN ADULT: ICD-10-CM

## 2022-04-28 DIAGNOSIS — E78.2 MIXED HYPERLIPIDEMIA: ICD-10-CM

## 2022-04-28 DIAGNOSIS — I10 ESSENTIAL HYPERTENSION: ICD-10-CM

## 2022-04-28 DIAGNOSIS — I25.10 CORONARY ARTERY DISEASE INVOLVING NATIVE CORONARY ARTERY OF NATIVE HEART WITHOUT ANGINA PECTORIS: Primary | ICD-10-CM

## 2022-04-28 DIAGNOSIS — E66.09 CLASS 1 OBESITY DUE TO EXCESS CALORIES WITH SERIOUS COMORBIDITY AND BODY MASS INDEX (BMI) OF 32.0 TO 32.9 IN ADULT: ICD-10-CM

## 2022-04-28 PROCEDURE — 99214 OFFICE O/P EST MOD 30 MIN: CPT | Performed by: INTERNAL MEDICINE

## 2022-04-28 PROCEDURE — 3008F BODY MASS INDEX DOCD: CPT | Performed by: INTERNAL MEDICINE

## 2022-04-28 NOTE — ASSESSMENT & PLAN NOTE
From 1/18/2022: Total cholesterol 139, triglycerides 148, HDL 45, LDL 71.  Low HDL with acceptable LDL.  Severe constipation with higher dose atorvastatin.   Comorbidities: Coronary atherosclerosis and diabetes mellitus.  --Continue high intensity statin: Atorvastatin 40 mg daily.  --Goal LDL less than 70 given comorbidities.  --Lifestyle modification discussed at length.

## 2022-04-28 NOTE — PROGRESS NOTES
Elaian Daniel MD, [facility]  Cardiology    [facility]    [facility]  [address]  [address] [place] [address]    TEL  [phone]  [facility]/ml     2022    Reason for visit: Cardiovascular follow-up.    Isabel Spivey is a 66 y.o. female who presents for cardiovascular follow-up.  Isabel has a past medical history of hypertension, hyperlipidemia, and diabetes mellitus.  I last saw her in the office on 2021 at which time she was feeling well and no changes were made.    Interval history obtained from the patient and extensive review of all available medical records.  She has had some weight loss using Ozempic. She is down 18 pounds since our last office visit.  Lipids 2022 with low HDL and acceptable LDL. Hemoglobin A1c was down to 5.3%!    Today, Isabel returns for follow-up and reports feeling overall well from a cardiovascular standpoint. She states she feels really good.   She is walking 3-4 days per week. She is having pain in her left knee which is limiting her some. She is headed to knee replacement.   She denies chest pain, shortness of breath, orthopnea, PND, lower extremity edema, palpitations, or syncope.    Past Medical History:  1. Coronary artery disease   2. Hypertension  3. Hyperlipidemia  4. Diabetes mellitus  5. Osteoarthritis  6. Obstetric history: , no complications    Past Surgical History:  1. Ovarian cysts resection: Right,   2. Carpal tunnel repair: Bilateral  3. Hysterectomy:   4. Colon resection secondary to complication of hysterectomy:   5. Total knee arthroplasty: Right,   6. Trigger finger release    Medications: Aspirin 81 mg daily, atorvastatin 40 mg daily, metformin 500 mg twice daily, ramipril 2.5 mg daily, triamterene-HCTZ 37.5-25 mg as needed, semaglutide 0.25 mg subcu weekly, fish oil, vitamin C, D, and B.    Allergies: No known drug  allergies    Social History: , Michael. 2 adult children. Former smoker, 1 pack per month, quit . Social alcohol use. Denies illicit drug use.    Family History: Reviewed and notable for unknown heart disease in her mother, who ultimately  of metastatic cancer at age 85.    Exam:  Objective   Vitals:    22 1119   BP: 124/78   Pulse: 88   SpO2: 99%     Body mass index is 32.85 kg/m².  Physical Exam  Constitutional:       General: She is not in acute distress.     Appearance: She is well-developed.   HENT:      Head: Normocephalic.      Mouth/Throat:      Mouth: Mucous membranes are not cyanotic.   Eyes:      Conjunctiva/sclera: Conjunctivae normal.   Neck:      Vascular: No carotid bruit or JVD.   Cardiovascular:      Rate and Rhythm: Normal rate and regular rhythm.  No extrasystoles are present.     Pulses:           Carotid pulses are 2+ on the right side and 2+ on the left side.       Radial pulses are 2+ on the right side and 2+ on the left side.      Heart sounds: S1 normal and S2 normal. No murmur heard.    No gallop.   Pulmonary:      Effort: Pulmonary effort is normal. No respiratory distress.      Breath sounds: No wheezing or rales.   Abdominal:      General: Bowel sounds are normal.      Palpations: Abdomen is soft.      Tenderness: There is no abdominal tenderness.   Lymphadenopathy:      Cervical: No cervical adenopathy.   Skin:     General: Skin is warm and dry.   Neurological:      Mental Status: She is alert.   Psychiatric:         Speech: Speech normal.         Labs:  Lab Results   Component Value Date    WBC 6.7 2022    HGB 14.8 2022     2022    CHOL 139 2022    TRIG 148 2022    HDL 45 (L) 2022    ALT 24 2022    AST 22 2022     2022    K 4.1 2022    CREATININE 0.68 2022    TSH 1.40 2016    HGBA1C 5.3 2022    MICROALBUR 0.2 2021     Personally reviewed, my interpretation: LDL 71.   "Improved A1c.  Normal renal and liver function.    Cardiovascular Studies:   1.  Coronary calcium score 4/2/18: Composite 562.  Left main 0.  .  .  LCx 169. Composite 562 is between the 95 and 100 percentile for females between the ages of 55 and 64, as per [employer] 2006 Database. This correlates with \"extensive atherosclerotic plaque\" with \"high likelihood of at least one significant coronary narrowing\".    Assessment/Plan   Problem List Items Addressed This Visit        Circulatory    Coronary artery disease involving native coronary artery of native heart without angina pectoris - Primary     Coronary calcium score 4/2/18: Left main 0.  .  .  LCx 169. Composite 562 is between the 95 and 100 percentile for females her age.  Asymptomatic.  --Continue aspirin and high intensity statin.  --Lifestyle modification discussed at length, particularly recommending improve dietary habits, increase regular aerobic activity, and weight loss.           Essential hypertension     Blood pressure acceptable, measuring 124/78 mmHg on my manual reading.   --Continue current regimen: Ramipril 2.5 mg daily and triamterene-HCTZ.   --Goal blood pressure less than 130/80 mmHg.  --Lifestyle modification discussed, encouraged increased exercise which she has started.              Endocrine/Metabolic    Mixed hyperlipidemia     From 1/18/2022: Total cholesterol 139, triglycerides 148, HDL 45, LDL 71.  Low HDL with acceptable LDL.  Severe constipation with higher dose atorvastatin.   Comorbidities: Coronary atherosclerosis and diabetes mellitus.  --Continue high intensity statin: Atorvastatin 40 mg daily.  --Goal LDL less than 70 given comorbidities.  --Lifestyle modification discussed at length.           Class 1 obesity due to excess calories with serious comorbidity and body mass index (BMI) of 32.0 to 32.9 in adult     BMI 32.85.  She is down 18 pounds since our last office visit!  --Continued lifestyle " modification discussed, weight loss encouraged. Reviewed the cardiovascular benefits of a heart healthy diet and regular aerobic exercise.                     This letter was generated using speech recognition software. Please excuse any typographical errors.    Return in about 1 year (around 4/28/2023).        Elaina Daniel MD, [facility]

## 2022-04-28 NOTE — ASSESSMENT & PLAN NOTE
BMI 32.85.  She is down 18 pounds since our last office visit!  --Continued lifestyle modification discussed, weight loss encouraged. Reviewed the cardiovascular benefits of a heart healthy diet and regular aerobic exercise.

## 2022-04-28 NOTE — ASSESSMENT & PLAN NOTE
Blood pressure acceptable, measuring 124/78 mmHg on my manual reading.   --Continue current regimen: Ramipril 2.5 mg daily and triamterene-HCTZ.   --Goal blood pressure less than 130/80 mmHg.  --Lifestyle modification discussed, encouraged increased exercise which she has started.

## 2022-07-06 ENCOUNTER — TRANSCRIBE ORDERS (OUTPATIENT)
Dept: SCHEDULING | Age: 67
End: 2022-07-06

## 2022-07-06 DIAGNOSIS — Z12.31 ENCOUNTER FOR SCREENING MAMMOGRAM FOR MALIGNANT NEOPLASM OF BREAST: Primary | ICD-10-CM

## 2022-07-07 ENCOUNTER — HOSPITAL ENCOUNTER (OUTPATIENT)
Dept: RADIOLOGY | Facility: CLINIC | Age: 67
Discharge: HOME | End: 2022-07-07
Attending: OBSTETRICS & GYNECOLOGY
Payer: COMMERCIAL

## 2022-07-07 DIAGNOSIS — Z12.31 ENCOUNTER FOR SCREENING MAMMOGRAM FOR MALIGNANT NEOPLASM OF BREAST: ICD-10-CM

## 2022-07-07 LAB
ALBUMIN SERPL-MCNC: 4.2 G/DL (ref 3.6–5.1)
ALBUMIN/GLOB SERPL: 1.5 (CALC) (ref 1–2.5)
ALP SERPL-CCNC: 108 U/L (ref 37–153)
ALT SERPL-CCNC: 19 U/L (ref 6–29)
AST SERPL-CCNC: 21 U/L (ref 10–35)
BASOPHILS # BLD AUTO: 50 CELLS/UL (ref 0–200)
BASOPHILS NFR BLD AUTO: 0.8 %
BILIRUB SERPL-MCNC: 0.7 MG/DL (ref 0.2–1.2)
BUN SERPL-MCNC: 16 MG/DL (ref 7–25)
BUN/CREAT SERPL: NORMAL (CALC) (ref 6–22)
CALCIUM SERPL-MCNC: 10 MG/DL (ref 8.6–10.4)
CHLORIDE SERPL-SCNC: 104 MMOL/L (ref 98–110)
CHOLEST SERPL-MCNC: 138 MG/DL
CHOLEST/HDLC SERPL: 3 (CALC)
CO2 SERPL-SCNC: 29 MMOL/L (ref 20–32)
CREAT SERPL-MCNC: 0.73 MG/DL (ref 0.5–0.99)
EOSINOPHIL # BLD AUTO: 252 CELLS/UL (ref 15–500)
EOSINOPHIL NFR BLD AUTO: 4 %
ERYTHROCYTE [DISTWIDTH] IN BLOOD BY AUTOMATED COUNT: 12.6 % (ref 11–15)
GLOBULIN SER CALC-MCNC: 2.8 G/DL (CALC) (ref 1.9–3.7)
GLUCOSE SERPL-MCNC: 94 MG/DL (ref 65–99)
HBA1C MFR BLD: 5.3 % OF TOTAL HGB
HCT VFR BLD AUTO: 43 % (ref 35–45)
HDLC SERPL-MCNC: 46 MG/DL
HGB BLD-MCNC: 13.9 G/DL (ref 11.7–15.5)
LDLC SERPL CALC-MCNC: 71 MG/DL (CALC)
LYMPHOCYTES # BLD AUTO: 1468 CELLS/UL (ref 850–3900)
LYMPHOCYTES NFR BLD AUTO: 23.3 %
MCH RBC QN AUTO: 28.9 PG (ref 27–33)
MCHC RBC AUTO-ENTMCNC: 32.3 G/DL (ref 32–36)
MCV RBC AUTO: 89.4 FL (ref 80–100)
MONOCYTES # BLD AUTO: 561 CELLS/UL (ref 200–950)
MONOCYTES NFR BLD AUTO: 8.9 %
NEUTROPHILS # BLD AUTO: 3969 CELLS/UL (ref 1500–7800)
NEUTROPHILS NFR BLD AUTO: 63 %
NONHDLC SERPL-MCNC: 92 MG/DL (CALC)
PLATELET # BLD AUTO: 274 THOUSAND/UL (ref 140–400)
PMV BLD REES-ECKER: 10.1 FL (ref 7.5–12.5)
POTASSIUM SERPL-SCNC: 4.7 MMOL/L (ref 3.5–5.3)
PROT SERPL-MCNC: 7 G/DL (ref 6.1–8.1)
RBC # BLD AUTO: 4.81 MILLION/UL (ref 3.8–5.1)
SODIUM SERPL-SCNC: 141 MMOL/L (ref 135–146)
TRIGL SERPL-MCNC: 125 MG/DL
WBC # BLD AUTO: 6.3 THOUSAND/UL (ref 3.8–10.8)

## 2022-07-07 PROCEDURE — 77063 BREAST TOMOSYNTHESIS BI: CPT

## 2022-07-15 ENCOUNTER — OFFICE VISIT (OUTPATIENT)
Dept: FAMILY MEDICINE | Facility: CLINIC | Age: 67
End: 2022-07-15
Payer: COMMERCIAL

## 2022-07-15 VITALS
WEIGHT: 168 LBS | TEMPERATURE: 98.4 F | OXYGEN SATURATION: 97 % | HEIGHT: 61 IN | HEART RATE: 88 BPM | DIASTOLIC BLOOD PRESSURE: 82 MMHG | BODY MASS INDEX: 31.72 KG/M2 | SYSTOLIC BLOOD PRESSURE: 128 MMHG

## 2022-07-15 DIAGNOSIS — I25.10 CORONARY ARTERY DISEASE INVOLVING NATIVE CORONARY ARTERY OF NATIVE HEART WITHOUT ANGINA PECTORIS: ICD-10-CM

## 2022-07-15 DIAGNOSIS — I10 ESSENTIAL HYPERTENSION: ICD-10-CM

## 2022-07-15 DIAGNOSIS — E11.9 TYPE 2 DIABETES MELLITUS WITHOUT COMPLICATION, WITHOUT LONG-TERM CURRENT USE OF INSULIN (CMS/HCC): Primary | ICD-10-CM

## 2022-07-15 DIAGNOSIS — E78.2 MIXED HYPERLIPIDEMIA: ICD-10-CM

## 2022-07-15 PROCEDURE — 3008F BODY MASS INDEX DOCD: CPT | Performed by: INTERNAL MEDICINE

## 2022-07-15 PROCEDURE — 99214 OFFICE O/P EST MOD 30 MIN: CPT | Performed by: INTERNAL MEDICINE

## 2022-07-15 ASSESSMENT — ENCOUNTER SYMPTOMS
HEMATOLOGIC/LYMPHATIC NEGATIVE: 1
EYES NEGATIVE: 1
GASTROINTESTINAL NEGATIVE: 1
ALLERGIC/IMMUNOLOGIC NEGATIVE: 1
ENDOCRINE NEGATIVE: 1
MUSCULOSKELETAL NEGATIVE: 1
CONSTITUTIONAL NEGATIVE: 1
NEUROLOGICAL NEGATIVE: 1
CARDIOVASCULAR NEGATIVE: 1
RESPIRATORY NEGATIVE: 1
PSYCHIATRIC NEGATIVE: 1

## 2022-07-15 NOTE — PROGRESS NOTES
Isabel Spivey is a 67 y.o. female     66 y/o female presents for follow up   Reviewed labs at goal   LDL, A1c - signifcant reduction with ozempic     HTN - no headaches, dizziness, lightheadedness, CP  Checking at home   Tolerating meds without side effects     Saw Cardiology in April   Favorable evaluation     Wt Readings from Last 3 Encounters:  07/15/22 : 76.2 kg (168 lb)  22 : 77.6 kg (171 lb)  22 : 80.6 kg (177 lb 12.8 oz)  ozempic working well  Health conscious diet            Past Medical History:   Diagnosis Date   • Arthritis    • Hypertension    • Lipid disorder    • Type 2 diabetes mellitus (CMS/HCC)        Past Surgical History:   Procedure Laterality Date   • CARPAL TUNNEL RELEASE Bilateral    • COLON SURGERY     • HYSTERECTOMY      total   • JOINT REPLACEMENT Right     knee   • OOPHORECTOMY Right    • TRIGGER FINGER RELEASE Right     middle finger        Social History     Socioeconomic History   • Marital status:      Spouse name: None   • Number of children: None   • Years of education: None   • Highest education level: None   Tobacco Use   • Smoking status: Former Smoker     Quit date:      Years since quittin.5   • Smokeless tobacco: Never Used   Substance and Sexual Activity   • Alcohol use: Yes     Comment: ocassionally   • Drug use: No   • Sexual activity: Yes     Partners: Male       History reviewed. No pertinent family history.    No known drug allergies    Current Outpatient Medications   Medication Sig Dispense Refill   • amoxicillin (AMOXIL) 500 mg capsule TAKE 4 CAPSULES BY MOUTH ONE HOUR PRIOR TO DENTAL APPOINTMENT     • ascorbic acid (VITAMIN C) 250 mg tablet Take 250 mg by mouth daily.     • aspirin 81 mg enteric coated tablet Take 81 mg by mouth daily.     • atorvastatin 40 mg tablet Take 1 tablet (40 mg total) by mouth once daily. 90 tablet 1   • b complex vitamins capsule Take 1 capsule by mouth daily.     • ergocalciferol, vitamin D2, (VITAMIN D2 ORAL)  "Take by mouth.     • hydrocortisone 2.5 % cream Apply topically 2 (two) times a day. 30 g 1   • LINZESS 290 mcg capsule Take 1 capsule (290 mcg total) by mouth daily before breakfast. 90 capsule 1   • LORazepam (ATIVAN) 1 mg tablet Take 1 tablet by mouth 3 (three) times a day.     • lubiprostone (AMITIZA) 8 mcg capsule Take 1 capsule (8 mcg total) by mouth 2 (two) times a day with meals. 30 capsule 0   • nystatin-triamcinolone 100,000-0.1 unit/g-% cream Apply topically 2 (two) times a day as needed for itching or rash. 60 g 3   • omega-3 fatty acids-fish oil 360-1,200 mg capsule Take 1 capsule by mouth daily.     • OZEMPIC 0.25 mg or 0.5 mg(2 mg/1.5 mL) pen injector INJECT 0.25 MG UNDER THE SKIN ONCE A WEEK. 1.5 mL 3   • pen needle, diabetic (NOVOFINE PLUS) 32 gauge x 1/6\" needle Use with ozempic weekly 100 each 0   • penciclovir (DENAVIR) 1 % cream Apply topically 4 (four) times a day. 1.5 g 1   • ramipriL 2.5 mg capsule Take 1 capsule (2.5 mg total) by mouth once daily. 90 capsule 1   • triamterene-hydrochlorothiazid (DYAZIDE) 37.5-25 mg per capsule Take 1 capsule by mouth every morning. (Patient taking differently: Take 1 capsule by mouth 2 (two) times a week (Mon, Thu).) 90 capsule 1   • hydrocortisone 2.5 % cream Apply topically 2 (two) times a day. 28.35 g 1     No current facility-administered medications for this visit.       Review of Systems   Constitutional: Negative.    HENT: Negative.    Eyes: Negative.    Respiratory: Negative.    Cardiovascular: Negative.    Gastrointestinal: Negative.    Endocrine: Negative.    Genitourinary: Negative.    Musculoskeletal: Negative.    Skin: Negative.    Allergic/Immunologic: Negative.    Neurological: Negative.    Hematological: Negative.    Psychiatric/Behavioral: Negative.           Vitals:    07/15/22 0902   BP: 128/82   Pulse: 88   Temp: 36.9 °C (98.4 °F)   SpO2: 97%       Body mass index is 32.27 kg/m².      Physical Exam  Constitutional:       Appearance: " Normal appearance.   HENT:      Right Ear: Tympanic membrane, ear canal and external ear normal.      Left Ear: Tympanic membrane, ear canal and external ear normal.   Eyes:      Extraocular Movements: Extraocular movements intact.      Conjunctiva/sclera: Conjunctivae normal.      Pupils: Pupils are equal, round, and reactive to light.      Comments: anicteric no pallor         Cardiovascular:      Rate and Rhythm: Normal rate and regular rhythm.      Pulses: Normal pulses.      Heart sounds: Normal heart sounds. No murmur heard.  Pulmonary:      Effort: Pulmonary effort is normal.      Breath sounds: Normal breath sounds. No wheezing or rhonchi.   Abdominal:      General: Abdomen is flat. Bowel sounds are normal.      Palpations: Abdomen is soft. There is no mass.      Tenderness: There is no abdominal tenderness.   Musculoskeletal:      Right lower leg: No edema.      Left lower leg: No edema.   Neurological:      General: No focal deficit present.      Mental Status: She is alert and oriented to person, place, and time.   Psychiatric:         Mood and Affect: Mood normal.         Behavior: Behavior normal.          Assessment/Plan      Diagnoses and all orders for this visit:    Type 2 diabetes mellitus without complication, without long-term current use of insulin (CMS/[facility]) (Primary)  Marked improvement in A1c  Continue healthy lifestyle changes  Weight loss continues  Doing very well  Mixed hyperlipidemia  LDL at goal  Essential hypertension  Blood pressure at goal  Coronary artery disease involving native coronary artery of native heart without angina pectoris  Follows cardiology CAD stable    Patient for 2-week holiday off statin to evaluate if nocturnal cramping improves        No orders of the defined types were placed in this encounter.      There are no discontinued medications.     No orders of the defined types were placed in this encounter.       John Bowens,   7/15/2022

## 2022-08-11 ENCOUNTER — DOCUMENTATION (OUTPATIENT)
Dept: FAMILY MEDICINE | Facility: CLINIC | Age: 67
End: 2022-08-11
Payer: COMMERCIAL

## 2022-08-11 NOTE — PROGRESS NOTES
Akosua Mariee MA has completed a chart review for Qing Spivey and have determined that the care gap has been satisfied.    Care Gap Source:  (BC/BS)    Care Gap(s) Identified: Diabetic Eye Exam    Chart Review Completed: Yes     Normal eye exam completed on 5/27/21.

## 2022-08-25 RX ORDER — SEMAGLUTIDE 1.34 MG/ML
INJECTION, SOLUTION SUBCUTANEOUS
Qty: 1.5 ML | Refills: 3 | Status: CANCELLED | OUTPATIENT
Start: 2022-08-25

## 2022-08-25 NOTE — TELEPHONE ENCOUNTER
"Medicine Refill Request    Last Office: 7/15/2022   Last Consult Visit: Visit date not found  Last Telemedicine Visit: Visit date not found    Next Appointment: 1/17/2023      Current Outpatient Medications:   •  amoxicillin (AMOXIL) 500 mg capsule, TAKE 4 CAPSULES BY MOUTH ONE HOUR PRIOR TO DENTAL APPOINTMENT, Disp: , Rfl:   •  ascorbic acid (VITAMIN C) 250 mg tablet, Take 250 mg by mouth daily., Disp: , Rfl:   •  aspirin 81 mg enteric coated tablet, Take 81 mg by mouth daily., Disp: , Rfl:   •  atorvastatin 40 mg tablet, Take 1 tablet (40 mg total) by mouth once daily., Disp: 90 tablet, Rfl: 1  •  b complex vitamins capsule, Take 1 capsule by mouth daily., Disp: , Rfl:   •  ergocalciferol, vitamin D2, (VITAMIN D2 ORAL), Take by mouth., Disp: , Rfl:   •  hydrocortisone 2.5 % cream, Apply topically 2 (two) times a day., Disp: 30 g, Rfl: 1  •  LINZESS 290 mcg capsule, Take 1 capsule (290 mcg total) by mouth daily before breakfast., Disp: 90 capsule, Rfl: 1  •  LORazepam (ATIVAN) 1 mg tablet, Take 1 tablet by mouth 3 (three) times a day., Disp: , Rfl:   •  lubiprostone (AMITIZA) 8 mcg capsule, Take 1 capsule (8 mcg total) by mouth 2 (two) times a day with meals., Disp: 30 capsule, Rfl: 0  •  nystatin-triamcinolone 100,000-0.1 unit/g-% cream, Apply topically 2 (two) times a day as needed for itching or rash., Disp: 60 g, Rfl: 3  •  omega-3 fatty acids-fish oil 360-1,200 mg capsule, Take 1 capsule by mouth daily., Disp: , Rfl:   •  OZEMPIC 0.25 mg or 0.5 mg(2 mg/1.5 mL) pen injector, INJECT 0.25 MG UNDER THE SKIN ONCE A WEEK., Disp: 1.5 mL, Rfl: 3  •  pen needle, diabetic (NOVOFINE PLUS) 32 gauge x 1/6\" needle, Use with ozempic weekly, Disp: 100 each, Rfl: 0  •  penciclovir (DENAVIR) 1 % cream, Apply topically 4 (four) times a day., Disp: 1.5 g, Rfl: 1  •  ramipriL 2.5 mg capsule, Take 1 capsule (2.5 mg total) by mouth once daily., Disp: 90 capsule, Rfl: 1  •  triamterene-hydrochlorothiazid (DYAZIDE) 37.5-25 mg per " capsule, Take 1 capsule by mouth every morning. (Patient taking differently: Take 1 capsule by mouth 2 (two) times a week (Mon, Thu).), Disp: 90 capsule, Rfl: 1      BP Readings from Last 3 Encounters:   07/15/22 128/82   04/28/22 124/78   01/11/22 102/70       Recent Lab results:  Lab Results   Component Value Date    CHOL 138 07/07/2022   ,   Lab Results   Component Value Date    HDL 46 (L) 07/07/2022   ,   Lab Results   Component Value Date    LDLCALC 71 07/07/2022   ,   Lab Results   Component Value Date    TRIG 125 07/07/2022        Lab Results   Component Value Date    GLUCOSE 94 07/07/2022   ,   Lab Results   Component Value Date    HGBA1C 5.3 07/07/2022         Lab Results   Component Value Date    CREATININE 0.73 07/07/2022       Lab Results   Component Value Date    TSH 1.40 12/05/2016

## 2022-08-26 RX ORDER — SEMAGLUTIDE 1.34 MG/ML
INJECTION, SOLUTION SUBCUTANEOUS
Qty: 1.5 ML | Refills: 3 | Status: SHIPPED | OUTPATIENT
Start: 2022-08-26 | End: 2023-01-19 | Stop reason: SDUPTHER

## 2022-11-29 ENCOUNTER — TELEPHONE (OUTPATIENT)
Dept: FAMILY MEDICINE | Facility: CLINIC | Age: 67
End: 2022-11-29
Payer: COMMERCIAL

## 2022-11-29 NOTE — TELEPHONE ENCOUNTER
RN spoke to patient. Reports symptoms for a week and a half. Symptoms have improved. Currently reports congestion in throat, cough, sinus drip. Phlegm was yellow, now clear. Denies other symptoms. Patient did not test for Covid. Advised Mucinex, nasal sprays and informing office if symptoms do not continue to improve or worsen.

## 2022-11-29 NOTE — TELEPHONE ENCOUNTER
"----- Message from Luzma Olson MA sent at 11/29/2022 10:33 AM EST -----  Regarding: FW: Telemedicine schedule  Contact: [phone]    ----- Message -----  From: Isabel Spivey \"Qing\"  Sent: 11/29/2022  10:31 AM EST  To: [facility] Clinical Support [facility]  Subject: Telemedicine schedule                            No I havent     "

## 2023-01-17 ENCOUNTER — OFFICE VISIT (OUTPATIENT)
Dept: FAMILY MEDICINE | Facility: CLINIC | Age: 68
End: 2023-01-17
Payer: COMMERCIAL

## 2023-01-17 VITALS
OXYGEN SATURATION: 98 % | DIASTOLIC BLOOD PRESSURE: 84 MMHG | HEIGHT: 61 IN | WEIGHT: 166 LBS | RESPIRATION RATE: 16 BRPM | BODY MASS INDEX: 31.34 KG/M2 | HEART RATE: 78 BPM | SYSTOLIC BLOOD PRESSURE: 122 MMHG

## 2023-01-17 DIAGNOSIS — Z00.00 PREVENTATIVE HEALTH CARE: Primary | ICD-10-CM

## 2023-01-17 DIAGNOSIS — I10 ESSENTIAL HYPERTENSION: ICD-10-CM

## 2023-01-17 DIAGNOSIS — E78.2 MIXED HYPERLIPIDEMIA: ICD-10-CM

## 2023-01-17 DIAGNOSIS — E78.2 DM TYPE 2 WITH DIABETIC MIXED HYPERLIPIDEMIA (CMS/HCC)  (CMS/HCC): ICD-10-CM

## 2023-01-17 DIAGNOSIS — Z13.820 SCREENING FOR OSTEOPOROSIS: ICD-10-CM

## 2023-01-17 DIAGNOSIS — K59.04 FUNCTIONAL CONSTIPATION: ICD-10-CM

## 2023-01-17 DIAGNOSIS — E11.69 DM TYPE 2 WITH DIABETIC MIXED HYPERLIPIDEMIA (CMS/HCC)  (CMS/HCC): ICD-10-CM

## 2023-01-17 PROCEDURE — 99397 PER PM REEVAL EST PAT 65+ YR: CPT | Performed by: INTERNAL MEDICINE

## 2023-01-17 PROCEDURE — 3008F BODY MASS INDEX DOCD: CPT | Performed by: INTERNAL MEDICINE

## 2023-01-17 RX ORDER — LINACLOTIDE 290 UG/1
290 CAPSULE, GELATIN COATED ORAL
Qty: 90 CAPSULE | Refills: 1 | Status: SHIPPED | OUTPATIENT
Start: 2023-01-17 | End: 2024-03-15

## 2023-01-17 RX ORDER — LINACLOTIDE 290 UG/1
290 CAPSULE, GELATIN COATED ORAL
Qty: 90 CAPSULE | Refills: 1 | Status: SHIPPED | OUTPATIENT
Start: 2023-01-17 | End: 2023-01-17 | Stop reason: SDUPTHER

## 2023-01-17 ASSESSMENT — ENCOUNTER SYMPTOMS
GASTROINTESTINAL NEGATIVE: 1
ALLERGIC/IMMUNOLOGIC NEGATIVE: 1
CONSTITUTIONAL NEGATIVE: 1
MUSCULOSKELETAL NEGATIVE: 1
RESPIRATORY NEGATIVE: 1
ENDOCRINE NEGATIVE: 1
CARDIOVASCULAR NEGATIVE: 1
NEUROLOGICAL NEGATIVE: 1
PSYCHIATRIC NEGATIVE: 1
EYES NEGATIVE: 1
HEMATOLOGIC/LYMPHATIC NEGATIVE: 1

## 2023-01-17 ASSESSMENT — PATIENT HEALTH QUESTIONNAIRE - PHQ9: SUM OF ALL RESPONSES TO PHQ9 QUESTIONS 1 & 2: 0

## 2023-01-17 NOTE — PROGRESS NOTES
Isabel Spivey is a 67 y.o. female     68 y/o female presents for CPE     HTN - no headaches, dizziness, lightheadedness, CP  Checking at home   Tolerating meds without side effects     DM - Fasting BG - 120s   Post Prandial BG - 150-170  Tolerating meds   Any vision changes - none   Dietary changes - Health cosncious   Numbness/paresthesias - none    Last Ophtho -   Dr. Diggs  2022 - eye exam     Wt Readings from Last 3 Encounters:  23 : 75.3 kg (166 lb)  07/15/22 : 76.2 kg (168 lb)  22 : 77.6 kg (171 lb)         Past Medical History:   Diagnosis Date   • Arthritis    • Hypertension    • Lipid disorder    • Type 2 diabetes mellitus (CMS/HCC)        Past Surgical History:   Procedure Laterality Date   • CARPAL TUNNEL RELEASE Bilateral    • COLON SURGERY     • HYSTERECTOMY      total   • JOINT REPLACEMENT Right     knee   • OOPHORECTOMY Right    • TRIGGER FINGER RELEASE Right     middle finger        Social History     Socioeconomic History   • Marital status:      Spouse name: None   • Number of children: None   • Years of education: None   • Highest education level: None   Tobacco Use   • Smoking status: Former     Types: Cigarettes     Quit date:      Years since quittin.0   • Smokeless tobacco: Never   Substance and Sexual Activity   • Alcohol use: Yes     Comment: ocassionally   • Drug use: No   • Sexual activity: Yes     Partners: Male       History reviewed. No pertinent family history.    No known drug allergies    Current Outpatient Medications   Medication Sig Dispense Refill   • amoxicillin (AMOXIL) 500 mg capsule TAKE 4 CAPSULES BY MOUTH ONE HOUR PRIOR TO DENTAL APPOINTMENT     • ascorbic acid (VITAMIN C) 250 mg tablet Take 250 mg by mouth daily.     • aspirin 81 mg enteric coated tablet Take 81 mg by mouth daily.     • atorvastatin 40 mg tablet Take 1 tablet (40 mg total) by mouth once daily. 90 tablet 1   • b complex vitamins capsule Take 1 capsule by mouth daily.   "   • ergocalciferol, vitamin D2, (VITAMIN D2 ORAL) Take by mouth.     • hydrocortisone 2.5 % cream Apply topically 2 (two) times a day. 30 g 1   • LINZESS 290 mcg capsule Take 1 capsule (290 mcg total) by mouth daily before breakfast. 90 capsule 1   • LORazepam (ATIVAN) 1 mg tablet Take 1 tablet by mouth 3 (three) times a day.     • nystatin-triamcinolone 100,000-0.1 unit/g-% cream Apply topically 2 (two) times a day as needed for itching or rash. 60 g 3   • omega-3 fatty acids-fish oil 360-1,200 mg capsule Take 1 capsule by mouth daily.     • pen needle, diabetic (NOVOFINE PLUS) 32 gauge x 1/6\" needle Use with ozempic weekly 100 each 0   • ramipriL (ALTACE) 2.5 mg capsule TAKE 1 CAPSULE (2.5 MG TOTAL) BY MOUTH ONCE DAILY. 90 capsule 1   • semaglutide (OZEMPIC) 0.25 mg or 0.5 mg(2 mg/1.5 mL) pen injector INJECT 0.25 MG UNDER THE SKIN ONCE A WEEK. 1.5 mL 3   • triamterene-hydrochlorothiazid (DYAZIDE) 37.5-25 mg per capsule Take 1 capsule by mouth every morning. (Patient taking differently: Take 1 capsule by mouth 2 (two) times a week (Mon, Thu).) 90 capsule 1     No current facility-administered medications for this visit.       Review of Systems   Constitutional: Negative.    HENT: Negative.    Eyes: Negative.    Respiratory: Negative.    Cardiovascular: Negative.    Gastrointestinal: Negative.    Endocrine: Negative.    Genitourinary: Negative.    Musculoskeletal: Negative.    Skin: Negative.    Allergic/Immunologic: Negative.    Neurological: Negative.    Hematological: Negative.    Psychiatric/Behavioral: Negative.           Vitals:    01/17/23 0919   BP: 120/82   Pulse: 78   Resp: 16   SpO2: 98%       Body mass index is 31.89 kg/m².      Physical Exam  Constitutional:       Appearance: Normal appearance.   HENT:      Right Ear: Tympanic membrane, ear canal and external ear normal.      Left Ear: Tympanic membrane, ear canal and external ear normal.   Eyes:      Extraocular Movements: Extraocular movements " intact.      Conjunctiva/sclera: Conjunctivae normal.      Pupils: Pupils are equal, round, and reactive to light.      Comments: anicteric no pallor         Cardiovascular:      Rate and Rhythm: Normal rate and regular rhythm.      Pulses: Normal pulses.      Heart sounds: Normal heart sounds. No murmur heard.  Pulmonary:      Effort: Pulmonary effort is normal.      Breath sounds: Normal breath sounds. No wheezing or rhonchi.   Abdominal:      General: Abdomen is flat. Bowel sounds are normal.      Palpations: Abdomen is soft. There is no mass.      Tenderness: There is no abdominal tenderness.   Musculoskeletal:      Right lower leg: No edema.      Left lower leg: No edema.   Neurological:      General: No focal deficit present.      Mental Status: She is alert and oriented to person, place, and time.   Psychiatric:         Mood and Affect: Mood normal.         Behavior: Behavior normal.          Assessment/Plan      Diagnoses and all orders for this visit:    Preventative health care (Primary)  Immunizations UTD including COVID vaccine with booster   [place] UTD   Mammo UTD   For Dexa  No acute needs   Essential hypertension  At goal   Mixed hyperlipidemia  On statin   DM type 2 with diabetic mixed hyperlipidemia (CMS/HCC)  -     Comprehensive metabolic panel; Future  -     CBC and differential; Future  -     Lipid panel; Future  -     Hemoglobin A1c; Future  -     Microalbumin / creatinine urine ratio; Future  Continue lifestyle changes  At goal  Doing well on Ozempic  Continued weight loss  Functional constipation  Failure of Amitiza  For authorization of Linzess as has been only medicine beneficial  Screening for osteoporosis  -     DEXA BONE DENSITY; Future    Other orders  -     LINZESS 290 mcg capsule; Take 1 capsule (290 mcg total) by mouth daily before breakfast.          New Medications Ordered This Visit   Medications   • LINZESS 290 mcg capsule     Sig: Take 1 capsule (290 mcg total) by mouth daily  before breakfast.     Dispense:  90 capsule     Refill:  1       Medications Discontinued During This Encounter   Medication Reason   • lubiprostone (AMITIZA) 8 mcg capsule Patient Discontinued Medication   • LINZESS 290 mcg capsule Reorder   • penciclovir (DENAVIR) 1 % cream Patient Discontinued Medication   • LINZESS 290 mcg capsule Reorder        No orders of the defined types were placed in this encounter.       John Bowens,   1/17/2023

## 2023-01-19 ENCOUNTER — TELEPHONE (OUTPATIENT)
Dept: FAMILY MEDICINE | Facility: CLINIC | Age: 68
End: 2023-01-19

## 2023-01-19 NOTE — TELEPHONE ENCOUNTER
Medication Request   Patient PCP: John Bowens, DO  Next Office Visit: Visit date not found  Has this provider prescribed this medication before?: Yes   Medication Name:Nystatin-trimcinolone  Medication Dose:100,000-0.1 unit/g-%  Medication Frequency: apply topically 2 times a day as needed for itching or rash  Preferred Pharmacy:   [id number] [address] [address]  Phone: [phone] Fax: [phone]      Please allow 2 business days for your provider to send your medication request or to reach out to discuss.   Medication Request   Patient PCP: John Bowens, DO  Next Office Visit: Visit date not found  Has this provider prescribed this medication before?: Yes  Medication Name:Atorvastatin   Medication Dose: 40 mg   Medication Frequency: take 1 tablet by mouth once daily   Preferred Pharmacy:   [id number] [address] [address]  Phone: [phone] Fax: [phone]      Please allow 2 business days for your provider to send your medication request or to reach out to discuss.   Medication Request   Patient PCP: John Bowens, DO  Next Office Visit: Visit date not found  Has this provider prescribed this medication before?: Yes   Medication Name: Ramipril  Medication Dose: 2.5 mg   Medication Frequency: take 1 capsule by mouth once daily   Preferred Pharmacy:   [id number] [address] [address]  Phone: [phone] Fax: [phone]      Please allow 2 business days for your provider to send your medication request or to reach out to discuss.   Medication Request   Patient PCP: John Bowens, DO  Next Office Visit: Visit date not found  Has this provider prescribed this medication before?: Yes  Medication Name: Ozempic   Medication Dose: 0.25 mg   Medication Frequency: inject 0.25 mg under the skin once a week  Preferred  Pharmacy:   [id number] [address] [address]  Phone: [phone] Fax: [phone]      Please allow 2 business days for your provider to send your medication request or to reach out to discuss.   Medication Request   Patient PCP: John Bowens, DO  Next Office Visit: Visit date not found  Has this provider prescribed this medication before?: Yes  Medication Name: Hydrocortisone   Medication Dose: 2.5 % cream  Medication Frequency: Apply topically 2 times a day  Preferred Pharmacy:   [id number] [address] [address]  Phone: [phone] Fax: [phone]      Please allow 2 business days for your provider to send your medication request or to reach out to discuss.

## 2023-01-19 NOTE — TELEPHONE ENCOUNTER
Patient called regarding the Linzess 290 mg. The pharmacy informed her that a pre-authorization is needed in order to fill the script. Request for call back to confirm when it will be available. Best contact number [phone].

## 2023-01-20 RX ORDER — SEMAGLUTIDE 1.34 MG/ML
INJECTION, SOLUTION SUBCUTANEOUS
Qty: 1.5 ML | Refills: 3 | Status: SHIPPED | OUTPATIENT
Start: 2023-01-20 | End: 2023-05-26

## 2023-01-20 RX ORDER — RAMIPRIL 2.5 MG/1
2.5 CAPSULE ORAL
Qty: 90 CAPSULE | Refills: 1 | Status: SHIPPED | OUTPATIENT
Start: 2023-01-20 | End: 2023-07-24 | Stop reason: SDUPTHER

## 2023-01-20 RX ORDER — HYDROCORTISONE 25 MG/G
CREAM TOPICAL 2 TIMES DAILY
Qty: 30 G | Refills: 1 | Status: SHIPPED | OUTPATIENT
Start: 2023-01-20 | End: 2023-05-18

## 2023-01-20 RX ORDER — NYSTATIN AND TRIAMCINOLONE ACETONIDE 100000; 1 [USP'U]/G; MG/G
CREAM TOPICAL 2 TIMES DAILY PRN
Qty: 60 G | Refills: 3 | Status: SHIPPED | OUTPATIENT
Start: 2023-01-20

## 2023-01-20 RX ORDER — ATORVASTATIN CALCIUM 40 MG/1
40 TABLET, FILM COATED ORAL
Qty: 90 TABLET | Refills: 1 | Status: SHIPPED | OUTPATIENT
Start: 2023-01-20 | End: 2023-07-19

## 2023-02-08 LAB
ALBUMIN SERPL-MCNC: 4.1 G/DL (ref 3.6–5.1)
ALBUMIN/GLOB SERPL: 1.5 (CALC) (ref 1–2.5)
ALP SERPL-CCNC: 110 U/L (ref 37–153)
ALT SERPL-CCNC: 18 U/L (ref 6–29)
AST SERPL-CCNC: 19 U/L (ref 10–35)
BASOPHILS # BLD AUTO: 72 CELLS/UL (ref 0–200)
BASOPHILS NFR BLD AUTO: 1 %
BILIRUB SERPL-MCNC: 0.5 MG/DL (ref 0.2–1.2)
BUN SERPL-MCNC: 20 MG/DL (ref 7–25)
BUN/CREAT SERPL: NORMAL (CALC) (ref 6–22)
CALCIUM SERPL-MCNC: 9.8 MG/DL (ref 8.6–10.4)
CHLORIDE SERPL-SCNC: 104 MMOL/L (ref 98–110)
CHOLEST SERPL-MCNC: 128 MG/DL
CHOLEST/HDLC SERPL: 2.6 (CALC)
CO2 SERPL-SCNC: 29 MMOL/L (ref 20–32)
CREAT SERPL-MCNC: 0.69 MG/DL (ref 0.5–1.05)
EGFRCR SERPLBLD CKD-EPI 2021: 94 ML/MIN/1.73M2
EOSINOPHIL # BLD AUTO: 209 CELLS/UL (ref 15–500)
EOSINOPHIL NFR BLD AUTO: 2.9 %
ERYTHROCYTE [DISTWIDTH] IN BLOOD BY AUTOMATED COUNT: 12.5 % (ref 11–15)
GLOBULIN SER CALC-MCNC: 2.7 G/DL (CALC) (ref 1.9–3.7)
GLUCOSE SERPL-MCNC: 99 MG/DL (ref 65–99)
HBA1C MFR BLD: 5.2 % OF TOTAL HGB
HCT VFR BLD AUTO: 42.5 % (ref 35–45)
HDLC SERPL-MCNC: 49 MG/DL
HGB BLD-MCNC: 14.1 G/DL (ref 11.7–15.5)
LDLC SERPL CALC-MCNC: 60 MG/DL (CALC)
LYMPHOCYTES # BLD AUTO: 1627 CELLS/UL (ref 850–3900)
LYMPHOCYTES NFR BLD AUTO: 22.6 %
MCH RBC QN AUTO: 29 PG (ref 27–33)
MCHC RBC AUTO-ENTMCNC: 33.2 G/DL (ref 32–36)
MCV RBC AUTO: 87.3 FL (ref 80–100)
MONOCYTES # BLD AUTO: 677 CELLS/UL (ref 200–950)
MONOCYTES NFR BLD AUTO: 9.4 %
NEUTROPHILS # BLD AUTO: 4615 CELLS/UL (ref 1500–7800)
NEUTROPHILS NFR BLD AUTO: 64.1 %
NONHDLC SERPL-MCNC: 79 MG/DL (CALC)
PLATELET # BLD AUTO: 287 THOUSAND/UL (ref 140–400)
PMV BLD REES-ECKER: 10.1 FL (ref 7.5–12.5)
POTASSIUM SERPL-SCNC: 4.6 MMOL/L (ref 3.5–5.3)
PROT SERPL-MCNC: 6.8 G/DL (ref 6.1–8.1)
RBC # BLD AUTO: 4.87 MILLION/UL (ref 3.8–5.1)
SODIUM SERPL-SCNC: 140 MMOL/L (ref 135–146)
TRIGL SERPL-MCNC: 106 MG/DL
WBC # BLD AUTO: 7.2 THOUSAND/UL (ref 3.8–10.8)

## 2023-03-09 ENCOUNTER — APPOINTMENT (OUTPATIENT)
Dept: URBAN - METROPOLITAN AREA CLINIC 203 | Age: 68
Setting detail: DERMATOLOGY
End: 2023-03-13

## 2023-03-09 DIAGNOSIS — L82.0 INFLAMED SEBORRHEIC KERATOSIS: ICD-10-CM

## 2023-03-09 DIAGNOSIS — I78.1 NEVUS, NON-NEOPLASTIC: ICD-10-CM

## 2023-03-09 DIAGNOSIS — L57.8 OTHER SKIN CHANGES DUE TO CHRONIC EXPOSURE TO NONIONIZING RADIATION: ICD-10-CM

## 2023-03-09 DIAGNOSIS — L91.8 OTHER HYPERTROPHIC DISORDERS OF THE SKIN: ICD-10-CM

## 2023-03-09 PROCEDURE — OTHER SUNSCREEN RECOMMENDATIONS: OTHER

## 2023-03-09 PROCEDURE — 17110 DESTRUCT B9 LESION 1-14: CPT

## 2023-03-09 PROCEDURE — OTHER LIQUID NITROGEN: OTHER

## 2023-03-09 PROCEDURE — 99203 OFFICE O/P NEW LOW 30 MIN: CPT | Mod: 25

## 2023-03-09 PROCEDURE — OTHER REASSURANCE: OTHER

## 2023-03-09 PROCEDURE — OTHER PRESCRIPTION MEDICATION MANAGEMENT: OTHER

## 2023-03-09 PROCEDURE — OTHER COUNSELING: OTHER

## 2023-03-09 ASSESSMENT — LOCATION ZONE DERM
LOCATION ZONE: FACE
LOCATION ZONE: TRUNK
LOCATION ZONE: NECK
LOCATION ZONE: NECK

## 2023-03-09 ASSESSMENT — LOCATION DETAILED DESCRIPTION DERM
LOCATION DETAILED: LEFT INFERIOR LATERAL NECK
LOCATION DETAILED: LEFT INFERIOR LATERAL NECK
LOCATION DETAILED: LEFT CENTRAL MALAR CHEEK
LOCATION DETAILED: RIGHT CENTRAL MALAR CHEEK
LOCATION DETAILED: RIGHT INFERIOR LATERAL NECK
LOCATION DETAILED: RIGHT MEDIAL FOREHEAD
LOCATION DETAILED: LEFT MEDIAL BREAST 10-11:00 REGION
LOCATION DETAILED: RIGHT INFERIOR ANTERIOR NECK
LOCATION DETAILED: LEFT MEDIAL BREAST 11-12:00 REGION

## 2023-03-09 ASSESSMENT — LOCATION SIMPLE DESCRIPTION DERM
LOCATION SIMPLE: RIGHT ANTERIOR NECK
LOCATION SIMPLE: RIGHT CHEEK
LOCATION SIMPLE: LEFT ANTERIOR NECK
LOCATION SIMPLE: LEFT BREAST
LOCATION SIMPLE: LEFT CHEEK
LOCATION SIMPLE: LEFT ANTERIOR NECK
LOCATION SIMPLE: RIGHT FOREHEAD

## 2023-03-09 ASSESSMENT — PAIN INTENSITY VAS: HOW INTENSE IS YOUR PAIN 0 BEING NO PAIN, 10 BEING THE MOST SEVERE PAIN POSSIBLE?: NO PAIN

## 2023-03-09 NOTE — PROCEDURE: LIQUID NITROGEN
Detail Level: Detailed
Consent: The patient's consent was obtained including but not limited to risks of crusting, scabbing, blistering, scarring, darker or lighter pigmentary change, recurrence, incomplete removal and infection.
Medical Necessity Information: It is in your best interest to select a reason for this procedure from the list below. All of these items fulfill various CMS LCD requirements except the new and changing color options.
Render Note In Bullet Format When Appropriate: No
Spray Paint Text: The liquid nitrogen was applied to the skin utilizing a spray paint frosting technique.
Medical Necessity Clause: This procedure was medically necessary because the lesions that were treated were:
Show Applicator Variable?: Yes
Post-Care Instructions: I reviewed with the patient in detail post-care instructions. Patient is to wear sunprotection, and avoid picking at any of the treated lesions. Pt may apply Vaseline to crusted or scabbing areas.

## 2023-03-09 NOTE — PROCEDURE: PRESCRIPTION MEDICATION MANAGEMENT
Render In Strict Bullet Format?: No
Detail Level: Zone
Plan: Pt to schedule cosmetic appt for skin tag removal w/ Veda per  , pt notified

## 2023-03-13 NOTE — TELEPHONE ENCOUNTER
Patient as calling regarding this message. She says she will like to know if office started pre-auth for this medication (Linzess 290 mg). She needs pre-auth in order for pharmacy to fill prescriptions.

## 2023-03-15 ENCOUNTER — HOSPITAL ENCOUNTER (OUTPATIENT)
Dept: RADIOLOGY | Facility: CLINIC | Age: 68
Discharge: HOME | End: 2023-03-15
Attending: INTERNAL MEDICINE
Payer: COMMERCIAL

## 2023-03-15 DIAGNOSIS — Z13.820 SCREENING FOR OSTEOPOROSIS: ICD-10-CM

## 2023-03-15 PROCEDURE — 77080 DXA BONE DENSITY AXIAL: CPT

## 2023-03-20 ENCOUNTER — APPOINTMENT (OUTPATIENT)
Dept: URBAN - METROPOLITAN AREA CLINIC 203 | Age: 68
Setting detail: DERMATOLOGY
End: 2023-03-23

## 2023-03-20 DIAGNOSIS — L91.8 OTHER HYPERTROPHIC DISORDERS OF THE SKIN: ICD-10-CM

## 2023-03-20 DIAGNOSIS — I78.8 OTHER DISEASES OF CAPILLARIES: ICD-10-CM

## 2023-03-20 DIAGNOSIS — D18.0 HEMANGIOMA: ICD-10-CM

## 2023-03-20 PROBLEM — D23.39 OTHER BENIGN NEOPLASM OF SKIN OF OTHER PARTS OF FACE: Status: ACTIVE | Noted: 2023-03-20

## 2023-03-20 PROBLEM — D18.01 HEMANGIOMA OF SKIN AND SUBCUTANEOUS TISSUE: Status: ACTIVE | Noted: 2023-03-20

## 2023-03-20 PROCEDURE — OTHER BENIGN DESTRUCTION COSMETIC: OTHER

## 2023-03-20 PROCEDURE — OTHER PRESCRIPTION MEDICATION MANAGEMENT: OTHER

## 2023-03-20 PROCEDURE — 99212 OFFICE O/P EST SF 10 MIN: CPT

## 2023-03-20 ASSESSMENT — LOCATION SIMPLE DESCRIPTION DERM
LOCATION SIMPLE: LEFT CHEEK
LOCATION SIMPLE: RIGHT CHEEK
LOCATION SIMPLE: RIGHT NOSE

## 2023-03-20 ASSESSMENT — LOCATION ZONE DERM
LOCATION ZONE: FACE
LOCATION ZONE: NOSE

## 2023-03-20 ASSESSMENT — LOCATION DETAILED DESCRIPTION DERM
LOCATION DETAILED: LEFT CENTRAL MALAR CHEEK
LOCATION DETAILED: RIGHT NARIS
LOCATION DETAILED: RIGHT INFERIOR MEDIAL MALAR CHEEK
LOCATION DETAILED: RIGHT INFERIOR CENTRAL MALAR CHEEK

## 2023-03-20 NOTE — PROCEDURE: BENIGN DESTRUCTION COSMETIC
Consent: The patient's consent was obtained including but not limited to risks of crusting, scabbing, blistering, scarring, darker or lighter pigmentary change, recurrence, incomplete removal and infection.
Detail Level: Detailed
Post-Care Instructions: I reviewed with the patient in detail post-care instructions. Patient is to wear sunprotection, and avoid picking at any of the treated lesions. Pt may apply Vaseline to crusted or scabbing areas.
Anesthesia Volume In Cc: 0.5
Price (Use Numbers Only, No Special Characters Or $): 190.40

## 2023-03-20 NOTE — PROCEDURE: PRESCRIPTION MEDICATION MANAGEMENT
Samples Given: Recommended mainline for laser, laser and skin and renew
Detail Level: Zone
Render In Strict Bullet Format?: No

## 2023-03-23 ENCOUNTER — APPOINTMENT (OUTPATIENT)
Dept: URBAN - METROPOLITAN AREA CLINIC 203 | Age: 68
Setting detail: DERMATOLOGY
End: 2023-03-24

## 2023-03-23 DIAGNOSIS — L73.8 OTHER SPECIFIED FOLLICULAR DISORDERS: ICD-10-CM

## 2023-03-23 PROBLEM — D23.39 OTHER BENIGN NEOPLASM OF SKIN OF OTHER PARTS OF FACE: Status: ACTIVE | Noted: 2023-03-23

## 2023-03-23 PROBLEM — D23.0 OTHER BENIGN NEOPLASM OF SKIN OF LIP: Status: ACTIVE | Noted: 2023-03-23

## 2023-03-23 PROCEDURE — OTHER BENIGN DESTRUCTION COSMETIC: OTHER

## 2023-03-23 PROCEDURE — OTHER ADDITIONAL NOTES: OTHER

## 2023-03-23 ASSESSMENT — LOCATION ZONE DERM: LOCATION ZONE: FACE

## 2023-03-23 ASSESSMENT — LOCATION SIMPLE DESCRIPTION DERM
LOCATION SIMPLE: RIGHT FOREHEAD
LOCATION SIMPLE: LEFT TEMPLE

## 2023-03-23 ASSESSMENT — LOCATION DETAILED DESCRIPTION DERM
LOCATION DETAILED: RIGHT INFERIOR LATERAL FOREHEAD
LOCATION DETAILED: LEFT INFERIOR TEMPLE

## 2023-03-29 ENCOUNTER — TRANSCRIBE ORDERS (OUTPATIENT)
Dept: SCHEDULING | Age: 68
End: 2023-03-29

## 2023-03-29 DIAGNOSIS — Z12.31 ENCOUNTER FOR SCREENING MAMMOGRAM FOR MALIGNANT NEOPLASM OF BREAST: Primary | ICD-10-CM

## 2023-04-28 ENCOUNTER — OFFICE VISIT (OUTPATIENT)
Dept: CARDIOLOGY | Facility: CLINIC | Age: 68
End: 2023-04-28
Payer: COMMERCIAL

## 2023-04-28 VITALS
DIASTOLIC BLOOD PRESSURE: 84 MMHG | WEIGHT: 164 LBS | OXYGEN SATURATION: 97 % | HEART RATE: 77 BPM | RESPIRATION RATE: 18 BRPM | BODY MASS INDEX: 32.2 KG/M2 | SYSTOLIC BLOOD PRESSURE: 122 MMHG | HEIGHT: 60 IN

## 2023-04-28 DIAGNOSIS — I25.10 CORONARY ARTERY DISEASE INVOLVING NATIVE CORONARY ARTERY OF NATIVE HEART WITHOUT ANGINA PECTORIS: Primary | ICD-10-CM

## 2023-04-28 DIAGNOSIS — I10 ESSENTIAL HYPERTENSION: ICD-10-CM

## 2023-04-28 DIAGNOSIS — E78.2 MIXED HYPERLIPIDEMIA: ICD-10-CM

## 2023-04-28 PROCEDURE — 99214 OFFICE O/P EST MOD 30 MIN: CPT | Performed by: INTERNAL MEDICINE

## 2023-04-28 PROCEDURE — 93000 ELECTROCARDIOGRAM COMPLETE: CPT | Performed by: INTERNAL MEDICINE

## 2023-04-28 PROCEDURE — 3008F BODY MASS INDEX DOCD: CPT | Performed by: INTERNAL MEDICINE

## 2023-04-28 NOTE — ASSESSMENT & PLAN NOTE
From 2/8/2023: Total cholesterol 128, triglycerides 106, HDL 49, LDL 60.  Low HDL with acceptable LDL.  Severe constipation with higher dose atorvastatin.   Comorbidities: Coronary atherosclerosis and diabetes mellitus.  --Continue high intensity statin: Atorvastatin 40 mg daily.  --She is having leg cramps, prefers to hold off on change for now. If symptoms increase or become bothersome, advised to call and we can hold with trial of rosuvastatin to see if better tolerated.  --Goal LDL less than 70 given comorbidities.  --Lifestyle modification discussed at length.

## 2023-04-28 NOTE — PROGRESS NOTES
Elaina Daniel MD, [facility]  Cardiology    [facility]    [facility]  [facility] [address]  [address] [place] [address]    TEL  [phone]  [facility]     2023    Reason for visit: Cardiovascular follow-up.    Isabel Spivey is a 67 y.o. female who presents for cardiovascular follow-up.  Isabel has a past medical history of hypertension, hyperlipidemia, and diabetes mellitus.  I last saw her in the office on 2022 at which time she was feeling well and no changes were made.    Interval history obtained from the patient and extensive review of all available medical records.  Her most recent labs were completed 2023 with LDL measuring 60 and hemoglobin A1c 5.2%.    Today, Isabel returns for follow-up and reports feeling overall well from a cardiovascular standpoint. She has a new grandson.   She is walking 3-4 days per week. She denies chest pain, shortness of breath, orthopnea, PND, lower extremity edema, palpitations, or syncope.  She is on Ozemic. She lost almost 40 pounds.   She does report toe and leg cramps, intermittent often when sitting. Symptoms are not terribly frequent. Dr. Bowens did hold statin last summer and after three weeks she noted less although still some. He restarted. She did try magnesium.    She will be travelling to [place] in the Fall.     Past Medical History:  1. Coronary artery disease   2. Hypertension  3. Hyperlipidemia  4. Diabetes mellitus  5. Osteoarthritis  6. Obstetric history: , no complications    Past Surgical History:  1. Ovarian cysts resection: Right,   2. Carpal tunnel repair: Bilateral  3. Hysterectomy:   4. Colon resection secondary to complication of hysterectomy:   5. Total knee arthroplasty: Right,   6. Trigger finger release    Medications: Aspirin 81 mg daily, atorvastatin 40 mg daily, ramipril 2.5 mg daily, triamterene-HCTZ 37.5-25 mg as needed,  semaglutide 0.5 mg subcu weekly, fish oil, vitamin C, D, and B.    Allergies: No known drug allergies    Social History: , Michael. 2 adult children. Former smoker, 1 pack per month, quit . Social alcohol use. Denies illicit drug use.    Family History: Reviewed and notable for unknown heart disease in her mother, who ultimately  of metastatic cancer at age 85.    Exam:  Objective   Vitals:    23 0957   BP: 122/84   Pulse: 77   Resp: 18   SpO2: 97%     Body mass index is 32.03 kg/m².  Physical Exam  Constitutional:       General: She is not in acute distress.     Appearance: She is well-developed.   HENT:      Head: Normocephalic.      Mouth/Throat:      Mouth: Mucous membranes are not cyanotic.   Eyes:      Conjunctiva/sclera: Conjunctivae normal.   Neck:      Vascular: No carotid bruit or JVD.   Cardiovascular:      Rate and Rhythm: Normal rate and regular rhythm. No extrasystoles are present.     Pulses:           Carotid pulses are 2+ on the right side and 2+ on the left side.       Radial pulses are 2+ on the right side and 2+ on the left side.      Heart sounds: S1 normal and S2 normal. No murmur heard.     No gallop.   Pulmonary:      Effort: Pulmonary effort is normal. No respiratory distress.      Breath sounds: No wheezing or rales.   Abdominal:      General: Bowel sounds are normal.      Palpations: Abdomen is soft.      Tenderness: There is no abdominal tenderness.   Lymphadenopathy:      Cervical: No cervical adenopathy.   Skin:     General: Skin is warm and dry.   Neurological:      Mental Status: She is alert.   Psychiatric:         Speech: Speech normal.         Labs:  Lab Results   Component Value Date    WBC 7.2 2023    HGB 14.1 2023     2023    CHOL 128 2023    TRIG 106 2023    HDL 49 (L) 2023    ALT 18 2023    AST 19 2023     2023    K 4.6 2023    CREATININE 0.69 2023    TSH 1.40 2016    HGBA1C  "5.2 02/08/2023    MICROALBUR 0.2 07/09/2021     Personally reviewed, my interpretation: LDL 60.  Improved A1c.  Normal renal and liver function.    Cardiovascular Studies:   1.  Coronary calcium score 4/2/18: Composite 562.  Left main 0.  .  .  LCx 169. Composite 562 is between the 95 and 100 percentile for females between the ages of 55 and 64, as per [employer] 2006 Database. This correlates with \"extensive atherosclerotic plaque\" with \"high likelihood of at least one significant coronary narrowing\".    EKG from today personally reviewed and shows sinus rhythm, poor R wave progression.    Assessment/Plan   Problem List Items Addressed This Visit        Circulatory    Coronary artery disease involving native coronary artery of native heart without angina pectoris - Primary     Coronary calcium score 4/2/18: Left main 0.  .  .  LCx 169. Composite 562 is between the 95 and 100 percentile for females her age.  Asymptomatic.  --Continue aspirin and high intensity statin.  --Lifestyle modification discussed at length, particularly recommending improve dietary habits, increase regular aerobic activity, and weight loss.         Relevant Orders    ECG 12 LEAD-OFFICE PERFORMED (Completed)    Essential hypertension     Blood pressure acceptable, measuring 122/84 mmHg on my manual reading.   --Continue current regimen: Ramipril 2.5 mg daily and triamterene-HCTZ twice weekly.   --Goal blood pressure less than 130/80 mmHg.  --Lifestyle modification discussed.            Other    Mixed hyperlipidemia     From 2/8/2023: Total cholesterol 128, triglycerides 106, HDL 49, LDL 60.  Low HDL with acceptable LDL.  Severe constipation with higher dose atorvastatin.   Comorbidities: Coronary atherosclerosis and diabetes mellitus.  --Continue high intensity statin: Atorvastatin 40 mg daily.  --She is having leg cramps, prefers to hold off on change for now. If symptoms increase or become bothersome, advised to call " and we can hold with trial of rosuvastatin to see if better tolerated.  --Goal LDL less than 70 given comorbidities.  --Lifestyle modification discussed at length.               This letter was generated using speech recognition software. Please excuse any typographical errors.    Return in about 1 year (around 4/28/2024).        Elaina Daniel MD, FACC

## 2023-04-28 NOTE — ASSESSMENT & PLAN NOTE
Blood pressure acceptable, measuring 122/84 mmHg on my manual reading.   --Continue current regimen: Ramipril 2.5 mg daily and triamterene-HCTZ twice weekly.   --Goal blood pressure less than 130/80 mmHg.  --Lifestyle modification discussed.

## 2023-05-18 RX ORDER — HYDROCORTISONE 25 MG/G
CREAM TOPICAL
Qty: 28.35 G | Refills: 0 | Status: SHIPPED | OUTPATIENT
Start: 2023-05-18 | End: 2025-03-14 | Stop reason: SDUPTHER

## 2023-05-18 NOTE — TELEPHONE ENCOUNTER
"Medicine Refill Request    Last Office: 1/17/2023   Last Consult Visit: Visit date not found  Last Telemedicine Visit: Visit date not found    Next Appointment: 7/17/2023      Current Outpatient Medications:   •  amoxicillin (AMOXIL) 500 mg capsule, TAKE 4 CAPSULES BY MOUTH ONE HOUR PRIOR TO DENTAL APPOINTMENT, Disp: , Rfl:   •  ascorbic acid (VITAMIN C) 250 mg tablet, Take 250 mg by mouth daily., Disp: , Rfl:   •  aspirin 81 mg enteric coated tablet, Take 81 mg by mouth daily., Disp: , Rfl:   •  atorvastatin (LIPITOR) 40 mg tablet, Take 1 tablet (40 mg total) by mouth once daily., Disp: 90 tablet, Rfl: 1  •  b complex vitamins capsule, Take 1 capsule by mouth daily., Disp: , Rfl:   •  ergocalciferol, vitamin D2, (VITAMIN D2 ORAL), Take by mouth., Disp: , Rfl:   •  hydrocortisone 2.5 % cream, Apply topically 2 (two) times a day., Disp: 30 g, Rfl: 1  •  LINZESS 290 mcg capsule, Take 1 capsule (290 mcg total) by mouth daily before breakfast., Disp: 90 capsule, Rfl: 1  •  LORazepam (ATIVAN) 1 mg tablet, Take 1 tablet by mouth 3 (three) times a day., Disp: , Rfl:   •  nystatin-triamcinolone (MYCOLOG II) 100,000-0.1 unit/g-% cream, Apply topically 2 (two) times a day as needed for itching or rash., Disp: 60 g, Rfl: 3  •  omega-3 fatty acids-fish oil 360-1,200 mg capsule, Take 1 capsule by mouth daily., Disp: , Rfl:   •  pen needle, diabetic (NOVOFINE PLUS) 32 gauge x 1/6\" needle, Use with ozempic weekly, Disp: 100 each, Rfl: 0  •  ramipriL (ALTACE) 2.5 mg capsule, Take 1 capsule (2.5 mg total) by mouth once daily., Disp: 90 capsule, Rfl: 1  •  semaglutide (OZEMPIC) 0.25 mg or 0.5 mg(2 mg/1.5 mL) pen injector, INJECT 0.25 MG UNDER THE SKIN ONCE A WEEK., Disp: 1.5 mL, Rfl: 3  •  triamterene-hydrochlorothiazid (DYAZIDE) 37.5-25 mg per capsule, Take 1 capsule by mouth every morning. (Patient not taking: Reported on 4/28/2023), Disp: 90 capsule, Rfl: 1      BP Readings from Last 3 Encounters:   04/28/23 122/84   01/17/23 " 122/84   07/15/22 128/82       Recent Lab results:  Lab Results   Component Value Date    CHOL 128 02/08/2023   ,   Lab Results   Component Value Date    HDL 49 (L) 02/08/2023   ,   Lab Results   Component Value Date    LDLCALC 60 02/08/2023   ,   Lab Results   Component Value Date    TRIG 106 02/08/2023        Lab Results   Component Value Date    GLUCOSE 99 02/08/2023   ,   Lab Results   Component Value Date    HGBA1C 5.2 02/08/2023         Lab Results   Component Value Date    CREATININE 0.69 02/08/2023       Lab Results   Component Value Date    TSH 1.40 12/05/2016

## 2023-05-26 RX ORDER — SEMAGLUTIDE 1.34 MG/ML
INJECTION, SOLUTION SUBCUTANEOUS
Qty: 1.5 ML | Refills: 3 | Status: SHIPPED | OUTPATIENT
Start: 2023-05-26 | End: 2023-07-17 | Stop reason: SDUPTHER

## 2023-05-26 NOTE — TELEPHONE ENCOUNTER
"Medicine Refill Request    Last Office: 1/17/2023   Last Consult Visit: Visit date not found  Last Telemedicine Visit: Visit date not found    Next Appointment: 7/17/2023      Current Outpatient Medications:   •  amoxicillin (AMOXIL) 500 mg capsule, TAKE 4 CAPSULES BY MOUTH ONE HOUR PRIOR TO DENTAL APPOINTMENT, Disp: , Rfl:   •  ascorbic acid (VITAMIN C) 250 mg tablet, Take 250 mg by mouth daily., Disp: , Rfl:   •  aspirin 81 mg enteric coated tablet, Take 81 mg by mouth daily., Disp: , Rfl:   •  atorvastatin (LIPITOR) 40 mg tablet, Take 1 tablet (40 mg total) by mouth once daily., Disp: 90 tablet, Rfl: 1  •  b complex vitamins capsule, Take 1 capsule by mouth daily., Disp: , Rfl:   •  ergocalciferol, vitamin D2, (VITAMIN D2 ORAL), Take by mouth., Disp: , Rfl:   •  hydrocortisone 2.5 % cream, apply to affected area twice a day, Disp: 28.35 g, Rfl: 0  •  LINZESS 290 mcg capsule, Take 1 capsule (290 mcg total) by mouth daily before breakfast., Disp: 90 capsule, Rfl: 1  •  LORazepam (ATIVAN) 1 mg tablet, Take 1 tablet by mouth 3 (three) times a day., Disp: , Rfl:   •  nystatin-triamcinolone (MYCOLOG II) 100,000-0.1 unit/g-% cream, Apply topically 2 (two) times a day as needed for itching or rash., Disp: 60 g, Rfl: 3  •  omega-3 fatty acids-fish oil 360-1,200 mg capsule, Take 1 capsule by mouth daily., Disp: , Rfl:   •  pen needle, diabetic (NOVOFINE PLUS) 32 gauge x 1/6\" needle, Use with ozempic weekly, Disp: 100 each, Rfl: 0  •  ramipriL (ALTACE) 2.5 mg capsule, Take 1 capsule (2.5 mg total) by mouth once daily., Disp: 90 capsule, Rfl: 1  •  semaglutide (OZEMPIC) 0.25 mg or 0.5 mg(2 mg/1.5 mL) pen injector, INJECT 0.25 MG UNDER THE SKIN ONCE A WEEK., Disp: 1.5 mL, Rfl: 3  •  triamterene-hydrochlorothiazid (DYAZIDE) 37.5-25 mg per capsule, Take 1 capsule by mouth every morning. (Patient not taking: Reported on 4/28/2023), Disp: 90 capsule, Rfl: 1      BP Readings from Last 3 Encounters:   04/28/23 122/84   01/17/23 " 122/84   07/15/22 128/82       Recent Lab results:  Lab Results   Component Value Date    CHOL 128 02/08/2023   ,   Lab Results   Component Value Date    HDL 49 (L) 02/08/2023   ,   Lab Results   Component Value Date    LDLCALC 60 02/08/2023   ,   Lab Results   Component Value Date    TRIG 106 02/08/2023        Lab Results   Component Value Date    GLUCOSE 99 02/08/2023   ,   Lab Results   Component Value Date    HGBA1C 5.2 02/08/2023         Lab Results   Component Value Date    CREATININE 0.69 02/08/2023       Lab Results   Component Value Date    TSH 1.40 12/05/2016

## 2023-06-26 LAB — MLHC DIABETIC EYE EXAM (EXTERNAL): NORMAL

## 2023-07-17 ENCOUNTER — OFFICE VISIT (OUTPATIENT)
Dept: FAMILY MEDICINE | Facility: CLINIC | Age: 68
End: 2023-07-17
Payer: COMMERCIAL

## 2023-07-17 VITALS
HEIGHT: 60 IN | OXYGEN SATURATION: 99 % | DIASTOLIC BLOOD PRESSURE: 70 MMHG | BODY MASS INDEX: 32.98 KG/M2 | SYSTOLIC BLOOD PRESSURE: 120 MMHG | HEART RATE: 74 BPM | WEIGHT: 168 LBS | TEMPERATURE: 97.2 F | RESPIRATION RATE: 16 BRPM

## 2023-07-17 DIAGNOSIS — E66.09 CLASS 1 OBESITY DUE TO EXCESS CALORIES WITH SERIOUS COMORBIDITY AND BODY MASS INDEX (BMI) OF 32.0 TO 32.9 IN ADULT: ICD-10-CM

## 2023-07-17 DIAGNOSIS — E78.2 MIXED HYPERLIPIDEMIA: ICD-10-CM

## 2023-07-17 DIAGNOSIS — I25.10 CORONARY ARTERY DISEASE INVOLVING NATIVE CORONARY ARTERY OF NATIVE HEART WITHOUT ANGINA PECTORIS: ICD-10-CM

## 2023-07-17 DIAGNOSIS — E66.811 CLASS 1 OBESITY DUE TO EXCESS CALORIES WITH SERIOUS COMORBIDITY AND BODY MASS INDEX (BMI) OF 32.0 TO 32.9 IN ADULT: ICD-10-CM

## 2023-07-17 DIAGNOSIS — I10 ESSENTIAL HYPERTENSION: ICD-10-CM

## 2023-07-17 DIAGNOSIS — E11.69 DM TYPE 2 WITH DIABETIC MIXED HYPERLIPIDEMIA (CMS/HCC)  (CMS/HCC): Primary | ICD-10-CM

## 2023-07-17 DIAGNOSIS — E78.2 DM TYPE 2 WITH DIABETIC MIXED HYPERLIPIDEMIA (CMS/HCC)  (CMS/HCC): Primary | ICD-10-CM

## 2023-07-17 PROCEDURE — 99214 OFFICE O/P EST MOD 30 MIN: CPT | Performed by: INTERNAL MEDICINE

## 2023-07-17 PROCEDURE — 3008F BODY MASS INDEX DOCD: CPT | Performed by: INTERNAL MEDICINE

## 2023-07-17 RX ORDER — SEMAGLUTIDE 1.34 MG/ML
0.5 INJECTION, SOLUTION SUBCUTANEOUS
Qty: 1.5 ML | Refills: 3 | Status: SHIPPED | OUTPATIENT
Start: 2023-07-17 | End: 2023-07-24 | Stop reason: SDUPTHER

## 2023-07-17 ASSESSMENT — ENCOUNTER SYMPTOMS
ALLERGIC/IMMUNOLOGIC NEGATIVE: 1
ENDOCRINE NEGATIVE: 1
RESPIRATORY NEGATIVE: 1
CONSTITUTIONAL NEGATIVE: 1
MUSCULOSKELETAL NEGATIVE: 1
CARDIOVASCULAR NEGATIVE: 1
NEUROLOGICAL NEGATIVE: 1
GASTROINTESTINAL NEGATIVE: 1
HEMATOLOGIC/LYMPHATIC NEGATIVE: 1
PSYCHIATRIC NEGATIVE: 1
EYES NEGATIVE: 1

## 2023-07-17 NOTE — PROGRESS NOTES
"Isabel Spivey is a 68 y.o. female     68-year-old female presents for follow-up  \"annoyed gained 4 lbs\"  Wt Readings from Last 3 Encounters:  23 : 76.2 kg (168 lb)  23 : 74.4 kg (164 lb)  23 : 75.3 kg (166 lb)    HTN - no headaches, dizziness, lightheadedness, CP  Checking at home   Tolerating meds without side effects      DM - Fasting BG - 120s   Post Prandial BG - 150-170  Tolerating meds   Any vision changes - none   Dietary changes - Health cosncious   Numbness/paresthesias - none  Ozempic not reducing diet   Less exercise with heat     Last Ophtho -   Dr. Diggs  2023 - eye exam        Past Medical History:   Diagnosis Date   • Arthritis    • Hypertension    • Lipid disorder    • Type 2 diabetes mellitus (CMS/HCC)        Past Surgical History:   Procedure Laterality Date   • CARPAL TUNNEL RELEASE Bilateral    • COLON SURGERY     • HYSTERECTOMY      total   • JOINT REPLACEMENT Right     knee   • OOPHORECTOMY Right    • TRIGGER FINGER RELEASE Right     middle finger        Social History     Socioeconomic History   • Marital status:      Spouse name: None   • Number of children: None   • Years of education: None   • Highest education level: None   Tobacco Use   • Smoking status: Former     Types: Cigarettes     Quit date:      Years since quittin.5   • Smokeless tobacco: Never   Vaping Use   • Vaping Use: Never used   Substance and Sexual Activity   • Alcohol use: Yes     Comment: ocassionally   • Drug use: No   • Sexual activity: Yes     Partners: Male       History reviewed. No pertinent family history.    No known drug allergies    Current Outpatient Medications   Medication Sig Dispense Refill   • amoxicillin (AMOXIL) 500 mg capsule TAKE 4 CAPSULES BY MOUTH ONE HOUR PRIOR TO DENTAL APPOINTMENT     • ascorbic acid (VITAMIN C) 250 mg tablet Take 250 mg by mouth daily.     • aspirin 81 mg enteric coated tablet Take 81 mg by mouth daily.     • atorvastatin (LIPITOR) " "40 mg tablet Take 1 tablet (40 mg total) by mouth once daily. 90 tablet 1   • b complex vitamins capsule Take 1 capsule by mouth daily.     • ergocalciferol, vitamin D2, (VITAMIN D2 ORAL) Take by mouth.     • hydrocortisone 2.5 % cream apply to affected area twice a day 28.35 g 0   • LINZESS 290 mcg capsule Take 1 capsule (290 mcg total) by mouth daily before breakfast. 90 capsule 1   • LORazepam (ATIVAN) 1 mg tablet Take 1 tablet by mouth 3 (three) times a day.     • nystatin-triamcinolone (MYCOLOG II) 100,000-0.1 unit/g-% cream Apply topically 2 (two) times a day as needed for itching or rash. 60 g 3   • omega-3 fatty acids-fish oil 360-1,200 mg capsule Take 1 capsule by mouth daily.     • pen needle, diabetic (NOVOFINE PLUS) 32 gauge x 1/6\" needle Use with ozempic weekly 100 each 0   • ramipriL (ALTACE) 2.5 mg capsule Take 1 capsule (2.5 mg total) by mouth once daily. 90 capsule 1   • semaglutide (OZEMPIC) 0.25 mg or 0.5 mg(2 mg/1.5 mL) subcutaneous injection inject 0.25 milligrams subcutaneously every week 1.5 mL 3   • triamterene-hydrochlorothiazid (DYAZIDE) 37.5-25 mg per capsule Take 1 capsule by mouth every morning. 90 capsule 1     No current facility-administered medications for this visit.       Review of Systems   Constitutional: Negative.    HENT: Negative.    Eyes: Negative.    Respiratory: Negative.    Cardiovascular: Negative.    Gastrointestinal: Negative.    Endocrine: Negative.    Genitourinary: Negative.    Musculoskeletal: Negative.    Skin: Negative.    Allergic/Immunologic: Negative.    Neurological: Negative.    Hematological: Negative.    Psychiatric/Behavioral: Negative.           Vitals:    07/17/23 0753   BP: 120/70   Pulse: 74   Resp: 16   Temp: 36.2 °C (97.2 °F)   SpO2: 99%       Body mass index is 32.81 kg/m².      Physical Exam  Constitutional:       Appearance: Normal appearance.   HENT:      Right Ear: Tympanic membrane, ear canal and external ear normal.      Left Ear: Tympanic " membrane, ear canal and external ear normal.   Eyes:      Extraocular Movements: Extraocular movements intact.      Conjunctiva/sclera: Conjunctivae normal.      Pupils: Pupils are equal, round, and reactive to light.      Comments: anicteric no pallor         Cardiovascular:      Rate and Rhythm: Normal rate and regular rhythm.      Pulses: Normal pulses.      Heart sounds: Normal heart sounds. No murmur heard.  Pulmonary:      Effort: Pulmonary effort is normal.      Breath sounds: Normal breath sounds. No wheezing or rhonchi.   Abdominal:      General: Abdomen is flat. Bowel sounds are normal.      Palpations: Abdomen is soft. There is no mass.      Tenderness: There is no abdominal tenderness.   Musculoskeletal:      Right lower leg: No edema.      Left lower leg: No edema.   Neurological:      General: No focal deficit present.      Mental Status: She is alert and oriented to person, place, and time.   Psychiatric:         Mood and Affect: Mood normal.         Behavior: Behavior normal.          Assessment/Plan      Diagnoses and all orders for this visit:    DM type 2 with diabetic mixed hyperlipidemia (CMS/HCC) (Primary)  -     Comprehensive metabolic panel; Future  -     CBC and differential; Future  -     Lipid panel; Future  -     Hemoglobin A1c; Future  -     Microalbumin / creatinine urine ratio; Future  Follow-up A1c  Given plateauing weight will increase Ozempic to 0.5 mg  Will get eye exam from Dr. Anderson  Coronary artery disease involving native coronary artery of native heart without angina pectoris  No anginal equivalent symptoms  Continue aspirin statin ACE inhibitor  Current Outpatient Medications on File Prior to Visit   Medication Sig Dispense Refill   • amoxicillin (AMOXIL) 500 mg capsule TAKE 4 CAPSULES BY MOUTH ONE HOUR PRIOR TO DENTAL APPOINTMENT     • ascorbic acid (VITAMIN C) 250 mg tablet Take 250 mg by mouth daily.     • aspirin 81 mg enteric coated tablet Take 81 mg by mouth daily.    "  • atorvastatin (LIPITOR) 40 mg tablet Take 1 tablet (40 mg total) by mouth once daily. 90 tablet 1   • b complex vitamins capsule Take 1 capsule by mouth daily.     • ergocalciferol, vitamin D2, (VITAMIN D2 ORAL) Take by mouth.     • hydrocortisone 2.5 % cream apply to affected area twice a day 28.35 g 0   • LINZESS 290 mcg capsule Take 1 capsule (290 mcg total) by mouth daily before breakfast. 90 capsule 1   • LORazepam (ATIVAN) 1 mg tablet Take 1 tablet by mouth 3 (three) times a day.     • nystatin-triamcinolone (MYCOLOG II) 100,000-0.1 unit/g-% cream Apply topically 2 (two) times a day as needed for itching or rash. 60 g 3   • omega-3 fatty acids-fish oil 360-1,200 mg capsule Take 1 capsule by mouth daily.     • pen needle, diabetic (NOVOFINE PLUS) 32 gauge x 1/6\" needle Use with ozempic weekly 100 each 0   • ramipriL (ALTACE) 2.5 mg capsule Take 1 capsule (2.5 mg total) by mouth once daily. 90 capsule 1   • triamterene-hydrochlorothiazid (DYAZIDE) 37.5-25 mg per capsule Take 1 capsule by mouth every morning. 90 capsule 1     No current facility-administered medications on file prior to visit.       Essential hypertension  At goal continue current meds   Mixed hyperlipidemia  lipitor 40mg  Class 1 obesity due to excess calories with serious comorbidity and body mass index (BMI) of 32.0 to 32.9 in adult  ozempic  Other orders  -     semaglutide (OZEMPIC) 0.25 mg or 0.5 mg(2 mg/1.5 mL) subcutaneous injection; Inject 0.5 mg under the skin every (seven) 7 days.          No orders of the defined types were placed in this encounter.      There are no discontinued medications.     No orders of the defined types were placed in this encounter.       John Bowens DO  7/17/2023        "

## 2023-07-18 ENCOUNTER — HOSPITAL ENCOUNTER (OUTPATIENT)
Dept: RADIOLOGY | Facility: CLINIC | Age: 68
Discharge: HOME | End: 2023-07-18
Attending: OBSTETRICS & GYNECOLOGY
Payer: COMMERCIAL

## 2023-07-18 DIAGNOSIS — Z12.31 ENCOUNTER FOR SCREENING MAMMOGRAM FOR MALIGNANT NEOPLASM OF BREAST: ICD-10-CM

## 2023-07-18 PROCEDURE — 77067 SCR MAMMO BI INCL CAD: CPT

## 2023-07-18 PROCEDURE — 77063 BREAST TOMOSYNTHESIS BI: CPT

## 2023-07-19 DIAGNOSIS — E78.2 MIXED HYPERLIPIDEMIA: Primary | ICD-10-CM

## 2023-07-19 RX ORDER — ATORVASTATIN CALCIUM 40 MG/1
40 TABLET, FILM COATED ORAL DAILY
Qty: 90 TABLET | Refills: 3 | Status: SHIPPED | OUTPATIENT
Start: 2023-07-19 | End: 2024-07-10

## 2023-07-19 NOTE — TELEPHONE ENCOUNTER
"Medicine Refill Request    Last Office Visit: 7/17/2023   Last Consult Visit: Visit date not found  Last Telemedicine Visit: Visit date not found    Next Appointment: 1/22/2024      Current Outpatient Medications:   •  amoxicillin (AMOXIL) 500 mg capsule, TAKE 4 CAPSULES BY MOUTH ONE HOUR PRIOR TO DENTAL APPOINTMENT, Disp: , Rfl:   •  ascorbic acid (VITAMIN C) 250 mg tablet, Take 250 mg by mouth daily., Disp: , Rfl:   •  aspirin 81 mg enteric coated tablet, Take 81 mg by mouth daily., Disp: , Rfl:   •  atorvastatin (LIPITOR) 40 mg tablet, Take 1 tablet (40 mg total) by mouth once daily., Disp: 90 tablet, Rfl: 1  •  b complex vitamins capsule, Take 1 capsule by mouth daily., Disp: , Rfl:   •  ergocalciferol, vitamin D2, (VITAMIN D2 ORAL), Take by mouth., Disp: , Rfl:   •  hydrocortisone 2.5 % cream, apply to affected area twice a day, Disp: 28.35 g, Rfl: 0  •  LINZESS 290 mcg capsule, Take 1 capsule (290 mcg total) by mouth daily before breakfast., Disp: 90 capsule, Rfl: 1  •  LORazepam (ATIVAN) 1 mg tablet, Take 1 tablet by mouth 3 (three) times a day., Disp: , Rfl:   •  nystatin-triamcinolone (MYCOLOG II) 100,000-0.1 unit/g-% cream, Apply topically 2 (two) times a day as needed for itching or rash., Disp: 60 g, Rfl: 3  •  omega-3 fatty acids-fish oil 360-1,200 mg capsule, Take 1 capsule by mouth daily., Disp: , Rfl:   •  pen needle, diabetic (NOVOFINE PLUS) 32 gauge x 1/6\" needle, Use with ozempic weekly, Disp: 100 each, Rfl: 0  •  ramipriL (ALTACE) 2.5 mg capsule, Take 1 capsule (2.5 mg total) by mouth once daily., Disp: 90 capsule, Rfl: 1  •  semaglutide (OZEMPIC) 0.25 mg or 0.5 mg(2 mg/1.5 mL) subcutaneous injection, Inject 0.5 mg under the skin every (seven) 7 days., Disp: 1.5 mL, Rfl: 3  •  triamterene-hydrochlorothiazid (DYAZIDE) 37.5-25 mg per capsule, Take 1 capsule by mouth every morning., Disp: 90 capsule, Rfl: 1      BP Readings from Last 3 Encounters:   07/17/23 120/70   04/28/23 122/84   01/17/23 " 122/84       Recent Lab results:  Lab Results   Component Value Date    CHOL 128 02/08/2023   ,   Lab Results   Component Value Date    HDL 49 (L) 02/08/2023   ,   Lab Results   Component Value Date    LDLCALC 60 02/08/2023   ,   Lab Results   Component Value Date    TRIG 106 02/08/2023        Lab Results   Component Value Date    GLUCOSE 99 02/08/2023   ,   Lab Results   Component Value Date    HGBA1C 5.2 02/08/2023         Lab Results   Component Value Date    CREATININE 0.69 02/08/2023       Lab Results   Component Value Date    TSH 1.40 12/05/2016           Lab Results   Component Value Date    HGBA1C 5.2 02/08/2023

## 2023-07-20 LAB
ALBUMIN SERPL-MCNC: 4.2 G/DL (ref 3.6–5.1)
ALBUMIN/CREAT UR: NORMAL MCG/MG CREAT
ALBUMIN/GLOB SERPL: 1.5 (CALC) (ref 1–2.5)
ALP SERPL-CCNC: 116 U/L (ref 37–153)
ALT SERPL-CCNC: 18 U/L (ref 6–29)
AST SERPL-CCNC: 20 U/L (ref 10–35)
BASOPHILS # BLD AUTO: 58 CELLS/UL (ref 0–200)
BASOPHILS NFR BLD AUTO: 0.9 %
BILIRUB SERPL-MCNC: 0.5 MG/DL (ref 0.2–1.2)
BUN SERPL-MCNC: 17 MG/DL (ref 7–25)
BUN/CREAT SERPL: NORMAL (CALC) (ref 6–22)
CALCIUM SERPL-MCNC: 9.7 MG/DL (ref 8.6–10.4)
CHLORIDE SERPL-SCNC: 105 MMOL/L (ref 98–110)
CHOLEST SERPL-MCNC: 140 MG/DL
CHOLEST/HDLC SERPL: 2.7 (CALC)
CO2 SERPL-SCNC: 28 MMOL/L (ref 20–32)
CREAT SERPL-MCNC: 0.65 MG/DL (ref 0.6–1)
CREAT UR-MCNC: 74 MG/DL (ref 20–275)
EGFRCR SERPLBLD CKD-EPI 2021: 95 ML/MIN/1.73M2
EOSINOPHIL # BLD AUTO: 211 CELLS/UL (ref 15–500)
EOSINOPHIL NFR BLD AUTO: 3.3 %
ERYTHROCYTE [DISTWIDTH] IN BLOOD BY AUTOMATED COUNT: 12.3 % (ref 11–15)
GLOBULIN SER CALC-MCNC: 2.8 G/DL (CALC) (ref 1.9–3.7)
GLUCOSE SERPL-MCNC: 95 MG/DL (ref 65–99)
HBA1C MFR BLD: 5.2 % OF TOTAL HGB
HCT VFR BLD AUTO: 43.5 % (ref 35–45)
HDLC SERPL-MCNC: 51 MG/DL
HGB BLD-MCNC: 14.6 G/DL (ref 11.7–15.5)
LDLC SERPL CALC-MCNC: 67 MG/DL (CALC)
LYMPHOCYTES # BLD AUTO: 1434 CELLS/UL (ref 850–3900)
LYMPHOCYTES NFR BLD AUTO: 22.4 %
MCH RBC QN AUTO: 29.6 PG (ref 27–33)
MCHC RBC AUTO-ENTMCNC: 33.6 G/DL (ref 32–36)
MCV RBC AUTO: 88.1 FL (ref 80–100)
MICROALBUMIN UR-MCNC: <0.2 MG/DL
MONOCYTES # BLD AUTO: 576 CELLS/UL (ref 200–950)
MONOCYTES NFR BLD AUTO: 9 %
NEUTROPHILS # BLD AUTO: 4122 CELLS/UL (ref 1500–7800)
NEUTROPHILS NFR BLD AUTO: 64.4 %
NONHDLC SERPL-MCNC: 89 MG/DL (CALC)
PLATELET # BLD AUTO: 288 THOUSAND/UL (ref 140–400)
PMV BLD REES-ECKER: 9.8 FL (ref 7.5–12.5)
POTASSIUM SERPL-SCNC: 4.8 MMOL/L (ref 3.5–5.3)
PROT SERPL-MCNC: 7 G/DL (ref 6.1–8.1)
RBC # BLD AUTO: 4.94 MILLION/UL (ref 3.8–5.1)
SODIUM SERPL-SCNC: 140 MMOL/L (ref 135–146)
TRIGL SERPL-MCNC: 136 MG/DL
WBC # BLD AUTO: 6.4 THOUSAND/UL (ref 3.8–10.8)

## 2023-07-24 DIAGNOSIS — E11.69 DM TYPE 2 WITH DIABETIC MIXED HYPERLIPIDEMIA (CMS/HCC)  (CMS/HCC): Primary | ICD-10-CM

## 2023-07-24 DIAGNOSIS — E78.2 DM TYPE 2 WITH DIABETIC MIXED HYPERLIPIDEMIA (CMS/HCC)  (CMS/HCC): Primary | ICD-10-CM

## 2023-07-24 DIAGNOSIS — E66.09 CLASS 1 OBESITY DUE TO EXCESS CALORIES WITH SERIOUS COMORBIDITY AND BODY MASS INDEX (BMI) OF 32.0 TO 32.9 IN ADULT: ICD-10-CM

## 2023-07-24 DIAGNOSIS — E66.811 CLASS 1 OBESITY DUE TO EXCESS CALORIES WITH SERIOUS COMORBIDITY AND BODY MASS INDEX (BMI) OF 32.0 TO 32.9 IN ADULT: ICD-10-CM

## 2023-07-24 DIAGNOSIS — I10 ESSENTIAL HYPERTENSION: ICD-10-CM

## 2023-07-24 RX ORDER — RAMIPRIL 2.5 MG/1
2.5 CAPSULE ORAL
Qty: 90 CAPSULE | Refills: 1 | Status: SHIPPED | OUTPATIENT
Start: 2023-07-24 | End: 2024-04-15

## 2023-07-24 RX ORDER — SEMAGLUTIDE 1.34 MG/ML
0.5 INJECTION, SOLUTION SUBCUTANEOUS
Qty: 3 ML | Refills: 3 | Status: SHIPPED | OUTPATIENT
Start: 2023-07-24 | End: 2024-03-18

## 2023-07-24 NOTE — TELEPHONE ENCOUNTER
"Medicine Refill Request    Last Office Visit: 7/17/2023   Last Consult Visit: Visit date not found  Last Telemedicine Visit: Visit date not found    Next Appointment: 1/22/2024      Current Outpatient Medications:   •  amoxicillin (AMOXIL) 500 mg capsule, TAKE 4 CAPSULES BY MOUTH ONE HOUR PRIOR TO DENTAL APPOINTMENT, Disp: , Rfl:   •  ascorbic acid (VITAMIN C) 250 mg tablet, Take 250 mg by mouth daily., Disp: , Rfl:   •  aspirin 81 mg enteric coated tablet, Take 81 mg by mouth daily., Disp: , Rfl:   •  atorvastatin (LIPITOR) 40 mg tablet, Take 1 tablet (40 mg total) by mouth daily., Disp: 90 tablet, Rfl: 3  •  b complex vitamins capsule, Take 1 capsule by mouth daily., Disp: , Rfl:   •  ergocalciferol, vitamin D2, (VITAMIN D2 ORAL), Take by mouth., Disp: , Rfl:   •  hydrocortisone 2.5 % cream, apply to affected area twice a day, Disp: 28.35 g, Rfl: 0  •  LINZESS 290 mcg capsule, Take 1 capsule (290 mcg total) by mouth daily before breakfast., Disp: 90 capsule, Rfl: 1  •  LORazepam (ATIVAN) 1 mg tablet, Take 1 tablet by mouth 3 (three) times a day., Disp: , Rfl:   •  nystatin-triamcinolone (MYCOLOG II) 100,000-0.1 unit/g-% cream, Apply topically 2 (two) times a day as needed for itching or rash., Disp: 60 g, Rfl: 3  •  omega-3 fatty acids-fish oil 360-1,200 mg capsule, Take 1 capsule by mouth daily., Disp: , Rfl:   •  pen needle, diabetic (NOVOFINE PLUS) 32 gauge x 1/6\" needle, Use with ozempic weekly, Disp: 100 each, Rfl: 0  •  ramipriL (ALTACE) 2.5 mg capsule, Take 1 capsule (2.5 mg total) by mouth once daily., Disp: 90 capsule, Rfl: 1  •  semaglutide (OZEMPIC) 0.25 mg or 0.5 mg(2 mg/1.5 mL) subcutaneous injection, Inject 0.5 mg under the skin every (seven) 7 days., Disp: 1.5 mL, Rfl: 3  •  triamterene-hydrochlorothiazid (DYAZIDE) 37.5-25 mg per capsule, Take 1 capsule by mouth every morning., Disp: 90 capsule, Rfl: 1      BP Readings from Last 3 Encounters:   07/17/23 120/70   04/28/23 122/84   01/17/23 122/84 "       Recent Lab results:  Lab Results   Component Value Date    CHOL 140 07/19/2023   ,   Lab Results   Component Value Date    HDL 51 07/19/2023   ,   Lab Results   Component Value Date    LDLCALC 67 07/19/2023   ,   Lab Results   Component Value Date    TRIG 136 07/19/2023        Lab Results   Component Value Date    GLUCOSE 95 07/19/2023   ,   Lab Results   Component Value Date    HGBA1C 5.2 07/19/2023         Lab Results   Component Value Date    CREATININE 0.65 07/19/2023       Lab Results   Component Value Date    TSH 1.40 12/05/2016           Lab Results   Component Value Date    HGBA1C 5.2 07/19/2023

## 2023-10-24 ENCOUNTER — OFFICE VISIT (OUTPATIENT)
Dept: FAMILY MEDICINE | Facility: CLINIC | Age: 68
End: 2023-10-24
Payer: COMMERCIAL

## 2023-10-24 VITALS
TEMPERATURE: 97.5 F | DIASTOLIC BLOOD PRESSURE: 80 MMHG | OXYGEN SATURATION: 98 % | HEART RATE: 73 BPM | BODY MASS INDEX: 32.39 KG/M2 | SYSTOLIC BLOOD PRESSURE: 130 MMHG | WEIGHT: 165 LBS | HEIGHT: 60 IN | RESPIRATION RATE: 16 BRPM

## 2023-10-24 DIAGNOSIS — A69.20 ERYTHEMA MIGRANS (LYME DISEASE): Primary | ICD-10-CM

## 2023-10-24 PROCEDURE — 99213 OFFICE O/P EST LOW 20 MIN: CPT | Performed by: INTERNAL MEDICINE

## 2023-10-24 PROCEDURE — 3008F BODY MASS INDEX DOCD: CPT | Performed by: INTERNAL MEDICINE

## 2023-10-24 NOTE — PROGRESS NOTES
"Isabel Spivey is a 68 y.o. female     67 y/o female presents for follow up   Month ago   [place]   Had [place] loose fitting top on - that night developed \"lump\" rash - pruritic   Uncertain if tick or bite         Past Medical History:   Diagnosis Date    Arthritis     Hypertension     Lipid disorder     Type 2 diabetes mellitus (CMS/HCC)        Past Surgical History:   Procedure Laterality Date    CARPAL TUNNEL RELEASE Bilateral     COLON SURGERY      HYSTERECTOMY      total    JOINT REPLACEMENT Right     knee    OOPHORECTOMY Right     TRIGGER FINGER RELEASE Right     middle finger        Social History     Socioeconomic History    Marital status:      Spouse name: None    Number of children: None    Years of education: None    Highest education level: None   Tobacco Use    Smoking status: Former     Types: Cigarettes     Quit date:      Years since quittin.8    Smokeless tobacco: Never   Vaping Use    Vaping Use: Never used   Substance and Sexual Activity    Alcohol use: Yes     Comment: ocassionally    Drug use: No    Sexual activity: Yes     Partners: Male       History reviewed. No pertinent family history.    No known drug allergies    Current Outpatient Medications   Medication Sig Dispense Refill    amoxicillin (AMOXIL) 500 mg capsule TAKE 4 CAPSULES BY MOUTH ONE HOUR PRIOR TO DENTAL APPOINTMENT      ascorbic acid (VITAMIN C) 250 mg tablet Take 250 mg by mouth daily.      aspirin 81 mg enteric coated tablet Take 81 mg by mouth daily.      atorvastatin (LIPITOR) 40 mg tablet Take 1 tablet (40 mg total) by mouth daily. 90 tablet 3    b complex vitamins capsule Take 1 capsule by mouth daily.      ergocalciferol, vitamin D2, (VITAMIN D2 ORAL) Take by mouth.      hydrocortisone 2.5 % cream apply to affected area twice a day 28.35 g 0    LINZESS 290 mcg capsule Take 1 capsule (290 mcg total) by mouth daily before breakfast. 90 capsule 1    LORazepam " "(ATIVAN) 1 mg tablet Take 1 tablet by mouth 3 (three) times a day.      nystatin-triamcinolone (MYCOLOG II) 100,000-0.1 unit/g-% cream Apply topically 2 (two) times a day as needed for itching or rash. 60 g 3    omega-3 fatty acids-fish oil 360-1,200 mg capsule Take 1 capsule by mouth daily.      pen needle, diabetic (NOVOFINE PLUS) 32 gauge x 1/6\" needle Use with ozempic weekly 100 each 0    ramipriL (ALTACE) 2.5 mg capsule Take 1 capsule (2.5 mg total) by mouth once daily. 90 capsule 1    semaglutide (OZEMPIC) 0.25 mg or 0.5 mg(2 mg/1.5 mL) subcutaneous injection Inject 0.5 mg under the skin every (seven) 7 days. 3 mL 3    triamterene-hydrochlorothiazid (DYAZIDE) 37.5-25 mg per capsule Take 1 capsule by mouth every morning. 90 capsule 1     No current facility-administered medications for this visit.       Review of Systems   Constitutional: Negative.    HENT: Negative.    Eyes: Negative.    Respiratory: Negative.    Cardiovascular: Negative.    Gastrointestinal: Negative.    Endocrine: Negative.    Genitourinary: Negative.    Musculoskeletal: Negative.    Skin: Negative.    Allergic/Immunologic: Negative.    Neurological: Negative.    Hematological: Negative.    Psychiatric/Behavioral: Negative.           Vitals:    10/24/23 1720   BP: 130/80   Pulse: 73   Resp: 16   Temp: 36.4 °C (97.5 °F)   SpO2: 98%       Body mass index is 32.22 kg/m².      Physical Exam  Constitutional:       Appearance: Normal appearance.   HENT:      Right Ear: Tympanic membrane, ear canal and external ear normal.      Left Ear: Tympanic membrane, ear canal and external ear normal.   Eyes:      Extraocular Movements: Extraocular movements intact.      Conjunctiva/sclera: Conjunctivae normal.      Pupils: Pupils are equal, round, and reactive to light.      Comments: anicteric no pallor         Cardiovascular:      Rate and Rhythm: Normal rate and regular rhythm.      Pulses: Normal pulses.      Heart sounds: Normal heart sounds. No " murmur heard.  Pulmonary:      Effort: Pulmonary effort is normal.      Breath sounds: Normal breath sounds. No wheezing or rhonchi.   Abdominal:      General: Abdomen is flat. Bowel sounds are normal.      Palpations: Abdomen is soft. There is no mass.      Tenderness: There is no abdominal tenderness.   Musculoskeletal:      Right lower leg: No edema.      Left lower leg: No edema.   Skin:     Comments: Palpable nodule left abdomen nontender   Neurological:      General: No focal deficit present.      Mental Status: She is alert and oriented to person, place, and time.   Psychiatric:         Mood and Affect: Mood normal.         Behavior: Behavior normal.          Assessment/Plan      Diagnoses and all orders for this visit:    Erythema migrans (Lyme disease) (Primary)  -     Lyme Disease Antibodies (IgG, IgM),; Future  Prior to evaluation here erythematous uncertain if insect bite  Check Lyme in 3 to 4 weeks  Monitor for symptoms as discussed in office      No orders of the defined types were placed in this encounter.      There are no discontinued medications.     No orders of the defined types were placed in this encounter.       John Bowens, DO  10/24/2023

## 2023-10-30 ASSESSMENT — ENCOUNTER SYMPTOMS
CONSTITUTIONAL NEGATIVE: 1
ALLERGIC/IMMUNOLOGIC NEGATIVE: 1
GASTROINTESTINAL NEGATIVE: 1
NEUROLOGICAL NEGATIVE: 1
CARDIOVASCULAR NEGATIVE: 1
EYES NEGATIVE: 1
MUSCULOSKELETAL NEGATIVE: 1
PSYCHIATRIC NEGATIVE: 1
HEMATOLOGIC/LYMPHATIC NEGATIVE: 1
ENDOCRINE NEGATIVE: 1
RESPIRATORY NEGATIVE: 1

## 2023-12-20 LAB
B BURGDOR IGG SER QL IB: NEGATIVE
B BURGDOR IGM SER QL IB: NEGATIVE
B BURGDOR18KD IGG SER QL IB: ABNORMAL
B BURGDOR23KD IGG SER QL IB: ABNORMAL
B BURGDOR23KD IGM SER QL IB: REACTIVE
B BURGDOR28KD IGG SER QL IB: ABNORMAL
B BURGDOR30KD IGG SER QL IB: ABNORMAL
B BURGDOR39KD IGG SER QL IB: ABNORMAL
B BURGDOR39KD IGM SER QL IB: ABNORMAL
B BURGDOR41KD IGG SER QL IB: ABNORMAL
B BURGDOR41KD IGM SER QL IB: ABNORMAL
B BURGDOR45KD IGG SER QL IB: ABNORMAL
B BURGDOR58KD IGG SER QL IB: ABNORMAL
B BURGDOR66KD IGG SER QL IB: ABNORMAL
B BURGDOR93KD IGG SER QL IB: ABNORMAL

## 2024-01-10 ENCOUNTER — TELEPHONE (OUTPATIENT)
Dept: FAMILY MEDICINE | Facility: CLINIC | Age: 69
End: 2024-01-10
Payer: COMMERCIAL

## 2024-01-10 NOTE — TELEPHONE ENCOUNTER
Pt pharm sent in fax for a Klarissa be started for Ozempic 0.25-0.5 MG last filled on 1/4/24. Pa fax has been attached.

## 2024-01-22 ENCOUNTER — OFFICE VISIT (OUTPATIENT)
Dept: FAMILY MEDICINE | Facility: CLINIC | Age: 69
End: 2024-01-22
Payer: COMMERCIAL

## 2024-01-22 VITALS
WEIGHT: 166.5 LBS | TEMPERATURE: 97.3 F | OXYGEN SATURATION: 98 % | RESPIRATION RATE: 16 BRPM | HEART RATE: 83 BPM | DIASTOLIC BLOOD PRESSURE: 72 MMHG | SYSTOLIC BLOOD PRESSURE: 118 MMHG | HEIGHT: 62 IN | BODY MASS INDEX: 30.64 KG/M2

## 2024-01-22 DIAGNOSIS — E78.2 DM TYPE 2 WITH DIABETIC MIXED HYPERLIPIDEMIA (CMS/HCC)  (CMS/HCC): ICD-10-CM

## 2024-01-22 DIAGNOSIS — E78.2 MIXED HYPERLIPIDEMIA: ICD-10-CM

## 2024-01-22 DIAGNOSIS — E11.69 DM TYPE 2 WITH DIABETIC MIXED HYPERLIPIDEMIA (CMS/HCC)  (CMS/HCC): ICD-10-CM

## 2024-01-22 DIAGNOSIS — I10 ESSENTIAL HYPERTENSION: ICD-10-CM

## 2024-01-22 DIAGNOSIS — Z00.00 PREVENTATIVE HEALTH CARE: Primary | ICD-10-CM

## 2024-01-22 DIAGNOSIS — I25.10 CORONARY ARTERY DISEASE INVOLVING NATIVE CORONARY ARTERY OF NATIVE HEART WITHOUT ANGINA PECTORIS: ICD-10-CM

## 2024-01-22 PROCEDURE — 3008F BODY MASS INDEX DOCD: CPT | Performed by: INTERNAL MEDICINE

## 2024-01-22 PROCEDURE — 99397 PER PM REEVAL EST PAT 65+ YR: CPT | Performed by: INTERNAL MEDICINE

## 2024-01-22 RX ORDER — SEMAGLUTIDE 0.68 MG/ML
INJECTION, SOLUTION SUBCUTANEOUS
COMMUNITY
Start: 2024-01-04 | End: 2024-07-22 | Stop reason: SDUPTHER

## 2024-01-22 ASSESSMENT — ENCOUNTER SYMPTOMS
EYES NEGATIVE: 1
ENDOCRINE NEGATIVE: 1
HEMATOLOGIC/LYMPHATIC NEGATIVE: 1
CARDIOVASCULAR NEGATIVE: 1
CONSTITUTIONAL NEGATIVE: 1
ALLERGIC/IMMUNOLOGIC NEGATIVE: 1
GASTROINTESTINAL NEGATIVE: 1
RESPIRATORY NEGATIVE: 1
NEUROLOGICAL NEGATIVE: 1
PSYCHIATRIC NEGATIVE: 1
MUSCULOSKELETAL NEGATIVE: 1

## 2024-01-22 ASSESSMENT — PATIENT HEALTH QUESTIONNAIRE - PHQ9: SUM OF ALL RESPONSES TO PHQ9 QUESTIONS 1 & 2: 0

## 2024-01-22 NOTE — PROGRESS NOTES
Isabel Spivey is a 68 y.o. female     69 y/o female presents for CPE   Generally well     Morning cough   Occasional productive   Clear   No SOB     Seeing Gastro next week   + constipation   Using linzess    Vision no changes  Dental every 6 months  NO CP, SOB   Activity stays active  Memory no cognitive concerns   mood generally good  Bowel, bladder chronic constipation seeing GI upcoming  Diet health-conscious  Sleeping well    Wt Readings from Last 3 Encounters:  24 : 75.5 kg (166 lb 8 oz)  10/24/23 : 74.8 kg (165 lb)  23 : 76.2 kg (168 lb)          Past Medical History:   Diagnosis Date   • Arthritis    • Hypertension    • Lipid disorder    • Type 2 diabetes mellitus (CMS/HCC)        Past Surgical History:   Procedure Laterality Date   • CARPAL TUNNEL RELEASE Bilateral    • COLON SURGERY     • HYSTERECTOMY      total   • JOINT REPLACEMENT Right     knee   • OOPHORECTOMY Right    • TRIGGER FINGER RELEASE Right     middle finger        Social History     Socioeconomic History   • Marital status:      Spouse name: None   • Number of children: None   • Years of education: None   • Highest education level: None   Tobacco Use   • Smoking status: Former     Types: Cigarettes     Quit date:      Years since quittin.0   • Smokeless tobacco: Never   Vaping Use   • Vaping Use: Never used   Substance and Sexual Activity   • Alcohol use: Yes     Comment: ocassionally   • Drug use: No   • Sexual activity: Yes     Partners: Male       History reviewed. No pertinent family history.    No known drug allergies    Current Outpatient Medications   Medication Sig Dispense Refill   • amoxicillin (AMOXIL) 500 mg capsule TAKE 4 CAPSULES BY MOUTH ONE HOUR PRIOR TO DENTAL APPOINTMENT     • ascorbic acid (VITAMIN C) 250 mg tablet Take 250 mg by mouth daily.     • aspirin 81 mg enteric coated tablet Take 81 mg by mouth daily.     • atorvastatin (LIPITOR) 40 mg tablet Take 1 tablet (40 mg total) by mouth daily.  "90 tablet 3   • b complex vitamins capsule Take 1 capsule by mouth daily.     • ergocalciferol, vitamin D2, (VITAMIN D2 ORAL) Take by mouth.     • hydrocortisone 2.5 % cream apply to affected area twice a day 28.35 g 0   • LINZESS 290 mcg capsule Take 1 capsule (290 mcg total) by mouth daily before breakfast. 90 capsule 1   • LORazepam (ATIVAN) 1 mg tablet Take 1 tablet by mouth 3 (three) times a day.     • nystatin-triamcinolone (MYCOLOG II) 100,000-0.1 unit/g-% cream Apply topically 2 (two) times a day as needed for itching or rash. 60 g 3   • omega-3 fatty acids-fish oil 360-1,200 mg capsule Take 1 capsule by mouth daily.     • pen needle, diabetic (NOVOFINE PLUS) 32 gauge x 1/6\" needle Use with ozempic weekly 100 each 0   • ramipriL (ALTACE) 2.5 mg capsule Take 1 capsule (2.5 mg total) by mouth once daily. 90 capsule 1   • semaglutide (OZEMPIC) 0.25 mg or 0.5 mg(2 mg/1.5 mL) subcutaneous injection Inject 0.5 mg under the skin every (seven) 7 days. 3 mL 3   • triamterene-hydrochlorothiazid (DYAZIDE) 37.5-25 mg per capsule Take 1 capsule by mouth every morning. 90 capsule 1   • OZEMPIC 0.25 mg or 0.5 mg (2 mg/3 mL) subcutaneous injection INJECT 0.5 MG EVERY 7 DAYS       No current facility-administered medications for this visit.       Review of Systems   Constitutional: Negative.    HENT: Negative.    Eyes: Negative.    Respiratory: Negative.    Cardiovascular: Negative.    Gastrointestinal: Negative.    Endocrine: Negative.    Genitourinary: Negative.    Musculoskeletal: Negative.    Skin: Negative.    Allergic/Immunologic: Negative.    Neurological: Negative.    Hematological: Negative.    Psychiatric/Behavioral: Negative.           Vitals:    01/22/24 0904   BP: 118/72   Pulse: 83   Resp: 16   Temp: 36.3 °C (97.3 °F)   SpO2: 98%       Body mass index is 30.95 kg/m².      Physical Exam  Constitutional:       Appearance: Normal appearance.   HENT:      Right Ear: Tympanic membrane, ear canal and external ear " normal.      Left Ear: Tympanic membrane, ear canal and external ear normal.   Eyes:      Extraocular Movements: Extraocular movements intact.      Conjunctiva/sclera: Conjunctivae normal.      Pupils: Pupils are equal, round, and reactive to light.      Comments: anicteric no pallor         Cardiovascular:      Rate and Rhythm: Normal rate and regular rhythm.      Pulses: Normal pulses.      Heart sounds: Normal heart sounds. No murmur heard.  Pulmonary:      Effort: Pulmonary effort is normal.      Breath sounds: Normal breath sounds. No wheezing or rhonchi.   Abdominal:      General: Abdomen is flat. Bowel sounds are normal.      Palpations: Abdomen is soft. There is no mass.      Tenderness: There is no abdominal tenderness.   Musculoskeletal:      Right lower leg: No edema.      Left lower leg: No edema.   Neurological:      General: No focal deficit present.      Mental Status: She is alert and oriented to person, place, and time.   Psychiatric:         Mood and Affect: Mood normal.         Behavior: Behavior normal.          Assessment/Plan      Diagnoses and all orders for this visit:    Preventative health care (Primary)  Immunizations UTD including COVID vaccine with booster   Mammogram up-to-date  DEXA scan up-to-date  Will be seeing GI colonoscopy last done 2015  DM type 2 with diabetic mixed hyperlipidemia (CMS/HCC)   -     Hemoglobin A1c; Future  -     Comprehensive metabolic panel; Future  -     CBC and differential; Future  -     Lipid panel; Future  -     Microalbumin / creatinine urine ratio; Future    Coronary artery disease involving native coronary artery of native heart without angina pectoris  Stable no anginal equivalent symptoms  Essential hypertension  At goal continue current meds  Mixed hyperlipidemia  -     TSH w reflex FT4; Future  Follow-up lipid panel        New Medications Ordered This Visit   Medications   • OZEMPIC 0.25 mg or 0.5 mg (2 mg/3 mL) subcutaneous injection     Sig:  INJECT 0.5 MG EVERY 7 DAYS       There are no discontinued medications.     No orders of the defined types were placed in this encounter.       John Bowens,   1/22/2024

## 2024-01-26 ENCOUNTER — TRANSCRIBE ORDERS (OUTPATIENT)
Dept: SCHEDULING | Age: 69
End: 2024-01-26

## 2024-01-26 DIAGNOSIS — Z12.31 ENCOUNTER FOR SCREENING MAMMOGRAM FOR MALIGNANT NEOPLASM OF BREAST: Primary | ICD-10-CM

## 2024-01-30 LAB
ALBUMIN SERPL-MCNC: 4 G/DL (ref 3.6–5.1)
ALBUMIN/GLOB SERPL: 1.4 (CALC) (ref 1–2.5)
ALP SERPL-CCNC: 115 U/L (ref 37–153)
ALT SERPL-CCNC: 22 U/L (ref 6–29)
AST SERPL-CCNC: 22 U/L (ref 10–35)
BASOPHILS # BLD AUTO: 68 CELLS/UL (ref 0–200)
BASOPHILS NFR BLD AUTO: 1 %
BILIRUB SERPL-MCNC: 0.5 MG/DL (ref 0.2–1.2)
BUN SERPL-MCNC: 15 MG/DL (ref 7–25)
BUN/CREAT SERPL: NORMAL (CALC) (ref 6–22)
CALCIUM SERPL-MCNC: 9.4 MG/DL (ref 8.6–10.4)
CHLORIDE SERPL-SCNC: 105 MMOL/L (ref 98–110)
CHOLEST SERPL-MCNC: 134 MG/DL
CHOLEST/HDLC SERPL: 2.9 (CALC)
CO2 SERPL-SCNC: 27 MMOL/L (ref 20–32)
CREAT SERPL-MCNC: 0.62 MG/DL (ref 0.6–1)
EGFRCR SERPLBLD CKD-EPI 2021: 96 ML/MIN/1.73M2
EOSINOPHIL # BLD AUTO: 231 CELLS/UL (ref 15–500)
EOSINOPHIL NFR BLD AUTO: 3.4 %
ERYTHROCYTE [DISTWIDTH] IN BLOOD BY AUTOMATED COUNT: 12.3 % (ref 11–15)
GLOBULIN SER CALC-MCNC: 2.8 G/DL (CALC) (ref 1.9–3.7)
GLUCOSE SERPL-MCNC: 89 MG/DL (ref 65–99)
HBA1C MFR BLD: 5.2 % OF TOTAL HGB
HCT VFR BLD AUTO: 43.7 % (ref 35–45)
HDLC SERPL-MCNC: 46 MG/DL
HGB BLD-MCNC: 14.2 G/DL (ref 11.7–15.5)
LDLC SERPL CALC-MCNC: 66 MG/DL (CALC)
LYMPHOCYTES # BLD AUTO: 1503 CELLS/UL (ref 850–3900)
LYMPHOCYTES NFR BLD AUTO: 22.1 %
MCH RBC QN AUTO: 28.5 PG (ref 27–33)
MCHC RBC AUTO-ENTMCNC: 32.5 G/DL (ref 32–36)
MCV RBC AUTO: 87.6 FL (ref 80–100)
MONOCYTES # BLD AUTO: 571 CELLS/UL (ref 200–950)
MONOCYTES NFR BLD AUTO: 8.4 %
NEUTROPHILS # BLD AUTO: 4427 CELLS/UL (ref 1500–7800)
NEUTROPHILS NFR BLD AUTO: 65.1 %
NONHDLC SERPL-MCNC: 88 MG/DL (CALC)
PLATELET # BLD AUTO: 280 THOUSAND/UL (ref 140–400)
PMV BLD REES-ECKER: 10 FL (ref 7.5–12.5)
POTASSIUM SERPL-SCNC: 4.5 MMOL/L (ref 3.5–5.3)
PROT SERPL-MCNC: 6.8 G/DL (ref 6.1–8.1)
RBC # BLD AUTO: 4.99 MILLION/UL (ref 3.8–5.1)
SODIUM SERPL-SCNC: 141 MMOL/L (ref 135–146)
TRIGL SERPL-MCNC: 141 MG/DL
TSH SERPL-ACNC: 3.28 MIU/L (ref 0.4–4.5)
WBC # BLD AUTO: 6.8 THOUSAND/UL (ref 3.8–10.8)

## 2024-02-22 ENCOUNTER — TELEPHONE (OUTPATIENT)
Dept: FAMILY MEDICINE | Facility: CLINIC | Age: 69
End: 2024-02-22
Payer: COMMERCIAL

## 2024-02-22 NOTE — TELEPHONE ENCOUNTER
Pharmacy requesting prior auth for     OZEMPIC 0.25 mg or 0.5 mg (2 mg/3 mL) subcutaneous injection

## 2024-02-29 LAB
ALBUMIN/CREAT UR: 3 MCG/MG CREAT
CREAT UR-MCNC: 62 MG/DL (ref 20–275)
MICROALBUMIN UR-MCNC: 0.2 MG/DL

## 2024-03-14 ENCOUNTER — APPOINTMENT (OUTPATIENT)
Dept: URBAN - METROPOLITAN AREA CLINIC 203 | Age: 69
Setting detail: DERMATOLOGY
End: 2024-03-18

## 2024-03-14 DIAGNOSIS — L72.0 EPIDERMAL CYST: ICD-10-CM

## 2024-03-14 DIAGNOSIS — L57.8 OTHER SKIN CHANGES DUE TO CHRONIC EXPOSURE TO NONIONIZING RADIATION: ICD-10-CM

## 2024-03-14 DIAGNOSIS — B07.8 OTHER VIRAL WARTS: ICD-10-CM

## 2024-03-14 DIAGNOSIS — K59.04 FUNCTIONAL CONSTIPATION: Primary | ICD-10-CM

## 2024-03-14 PROBLEM — D23.39 OTHER BENIGN NEOPLASM OF SKIN OF OTHER PARTS OF FACE: Status: ACTIVE | Noted: 2024-03-14

## 2024-03-14 PROCEDURE — 99213 OFFICE O/P EST LOW 20 MIN: CPT | Mod: 25

## 2024-03-14 PROCEDURE — 17110 DESTRUCT B9 LESION 1-14: CPT

## 2024-03-14 PROCEDURE — OTHER SUNSCREEN RECOMMENDATIONS: OTHER

## 2024-03-14 PROCEDURE — OTHER COUNSELING: OTHER

## 2024-03-14 PROCEDURE — OTHER BENIGN DESTRUCTION: OTHER

## 2024-03-14 ASSESSMENT — LOCATION ZONE DERM
LOCATION ZONE: LIP
LOCATION ZONE: TRUNK
LOCATION ZONE: EAR

## 2024-03-14 ASSESSMENT — LOCATION SIMPLE DESCRIPTION DERM
LOCATION SIMPLE: LEFT LIP
LOCATION SIMPLE: LEFT EAR
LOCATION SIMPLE: LEFT BREAST

## 2024-03-14 ASSESSMENT — LOCATION DETAILED DESCRIPTION DERM
LOCATION DETAILED: LEFT CAVUM CONCHA
LOCATION DETAILED: LEFT MEDIAL BREAST 11-12:00 REGION
LOCATION DETAILED: LEFT NASOLABIAL FOLD
LOCATION DETAILED: LEFT MEDIAL BREAST 10-11:00 REGION

## 2024-03-14 NOTE — PROCEDURE: BENIGN DESTRUCTION
Medical Necessity Information: It is in your best interest to select a reason for this procedure from the list below. All of these items fulfill various CMS LCD requirements except the new and changing color options.
Post-Care Instructions: I reviewed with the patient in detail post-care instructions. Patient is to wear sunprotection, and avoid picking at any of the treated lesions. Pt may apply Vaseline to crusted or scabbing areas.
Treatment Number (Will Not Render If 0): 0
Medical Necessity Clause: This procedure was medically necessary because the lesions that were treated were:
Consent: The patient's consent was obtained including but not limited to risks of crusting, scabbing, blistering, scarring, darker or lighter pigmentary change, recurrence, incomplete removal and infection.
Add 52 Modifier (Optional): no
Anesthesia Volume In Cc: 0.5
Detail Level: Detailed

## 2024-03-15 ENCOUNTER — TELEPHONE (OUTPATIENT)
Dept: FAMILY MEDICINE | Facility: CLINIC | Age: 69
End: 2024-03-15
Payer: COMMERCIAL

## 2024-03-15 NOTE — TELEPHONE ENCOUNTER
PA has been approved good thru 2/14/24-3/15/24 for med Linaclotide 290mcg cap (Linzess)    Form has been attached

## 2024-03-15 NOTE — TELEPHONE ENCOUNTER
"Medicine Refill Request    Last Office Visit: 1/22/2024   Last Consult Visit: Visit date not found  Last Telemedicine Visit: Visit date not found    Next Appointment: 7/22/2024      Current Outpatient Medications:   •  amoxicillin (AMOXIL) 500 mg capsule, TAKE 4 CAPSULES BY MOUTH ONE HOUR PRIOR TO DENTAL APPOINTMENT, Disp: , Rfl:   •  ascorbic acid (VITAMIN C) 250 mg tablet, Take 250 mg by mouth daily., Disp: , Rfl:   •  aspirin 81 mg enteric coated tablet, Take 81 mg by mouth daily., Disp: , Rfl:   •  atorvastatin (LIPITOR) 40 mg tablet, Take 1 tablet (40 mg total) by mouth daily., Disp: 90 tablet, Rfl: 3  •  b complex vitamins capsule, Take 1 capsule by mouth daily., Disp: , Rfl:   •  ergocalciferol, vitamin D2, (VITAMIN D2 ORAL), Take by mouth., Disp: , Rfl:   •  hydrocortisone 2.5 % cream, apply to affected area twice a day, Disp: 28.35 g, Rfl: 0  •  LINZESS 290 mcg capsule, Take 1 capsule (290 mcg total) by mouth daily before breakfast., Disp: 90 capsule, Rfl: 1  •  LORazepam (ATIVAN) 1 mg tablet, Take 1 tablet by mouth 3 (three) times a day., Disp: , Rfl:   •  nystatin-triamcinolone (MYCOLOG II) 100,000-0.1 unit/g-% cream, Apply topically 2 (two) times a day as needed for itching or rash., Disp: 60 g, Rfl: 3  •  omega-3 fatty acids-fish oil 360-1,200 mg capsule, Take 1 capsule by mouth daily., Disp: , Rfl:   •  OZEMPIC 0.25 mg or 0.5 mg (2 mg/3 mL) subcutaneous injection, INJECT 0.5 MG EVERY 7 DAYS, Disp: , Rfl:   •  pen needle, diabetic (NOVOFINE PLUS) 32 gauge x 1/6\" needle, Use with ozempic weekly, Disp: 100 each, Rfl: 0  •  ramipriL (ALTACE) 2.5 mg capsule, Take 1 capsule (2.5 mg total) by mouth once daily., Disp: 90 capsule, Rfl: 1  •  semaglutide (OZEMPIC) 0.25 mg or 0.5 mg(2 mg/1.5 mL) subcutaneous injection, Inject 0.5 mg under the skin every (seven) 7 days., Disp: 3 mL, Rfl: 3  •  triamterene-hydrochlorothiazid (DYAZIDE) 37.5-25 mg per capsule, Take 1 capsule by mouth every morning., Disp: 90 capsule, " Rfl: 1      BP Readings from Last 3 Encounters:   01/22/24 118/72   10/24/23 130/80   07/17/23 120/70       Recent Lab results:  Lab Results   Component Value Date    CHOL 134 01/30/2024   ,   Lab Results   Component Value Date    HDL 46 (L) 01/30/2024   ,   Lab Results   Component Value Date    LDLCALC 66 01/30/2024   ,   Lab Results   Component Value Date    TRIG 141 01/30/2024        Lab Results   Component Value Date    GLUCOSE 89 01/30/2024   ,   Lab Results   Component Value Date    HGBA1C 5.2 01/30/2024         Lab Results   Component Value Date    CREATININE 0.62 01/30/2024       Lab Results   Component Value Date    TSH 3.28 01/30/2024           Lab Results   Component Value Date    HGBA1C 5.2 01/30/2024

## 2024-03-16 DIAGNOSIS — E78.2 DM TYPE 2 WITH DIABETIC MIXED HYPERLIPIDEMIA (CMS/HCC)  (CMS/HCC): ICD-10-CM

## 2024-03-16 DIAGNOSIS — E11.69 DM TYPE 2 WITH DIABETIC MIXED HYPERLIPIDEMIA (CMS/HCC)  (CMS/HCC): ICD-10-CM

## 2024-03-16 DIAGNOSIS — E66.811 CLASS 1 OBESITY DUE TO EXCESS CALORIES WITH SERIOUS COMORBIDITY AND BODY MASS INDEX (BMI) OF 32.0 TO 32.9 IN ADULT: ICD-10-CM

## 2024-03-16 DIAGNOSIS — E66.09 CLASS 1 OBESITY DUE TO EXCESS CALORIES WITH SERIOUS COMORBIDITY AND BODY MASS INDEX (BMI) OF 32.0 TO 32.9 IN ADULT: ICD-10-CM

## 2024-03-18 RX ORDER — SEMAGLUTIDE 0.68 MG/ML
0.5 INJECTION, SOLUTION SUBCUTANEOUS
Qty: 3 ML | Refills: 1 | Status: SHIPPED | OUTPATIENT
Start: 2024-03-18 | End: 2024-05-13

## 2024-03-18 NOTE — TELEPHONE ENCOUNTER
"Medicine Refill Request    Last Office Visit: 1/22/2024   Last Consult Visit: Visit date not found  Last Telemedicine Visit: Visit date not found    Next Appointment: 7/22/2024      Current Outpatient Medications:   •  amoxicillin (AMOXIL) 500 mg capsule, TAKE 4 CAPSULES BY MOUTH ONE HOUR PRIOR TO DENTAL APPOINTMENT, Disp: , Rfl:   •  ascorbic acid (VITAMIN C) 250 mg tablet, Take 250 mg by mouth daily., Disp: , Rfl:   •  aspirin 81 mg enteric coated tablet, Take 81 mg by mouth daily., Disp: , Rfl:   •  atorvastatin (LIPITOR) 40 mg tablet, Take 1 tablet (40 mg total) by mouth daily., Disp: 90 tablet, Rfl: 3  •  b complex vitamins capsule, Take 1 capsule by mouth daily., Disp: , Rfl:   •  ergocalciferol, vitamin D2, (VITAMIN D2 ORAL), Take by mouth., Disp: , Rfl:   •  hydrocortisone 2.5 % cream, apply to affected area twice a day, Disp: 28.35 g, Rfl: 0  •  linaCLOtide (LINZESS) 290 mcg capsule, Take 1 capsule (290 mcg total) by mouth daily before breakfast., Disp: 90 capsule, Rfl: 1  •  LORazepam (ATIVAN) 1 mg tablet, Take 1 tablet by mouth 3 (three) times a day., Disp: , Rfl:   •  nystatin-triamcinolone (MYCOLOG II) 100,000-0.1 unit/g-% cream, Apply topically 2 (two) times a day as needed for itching or rash., Disp: 60 g, Rfl: 3  •  omega-3 fatty acids-fish oil 360-1,200 mg capsule, Take 1 capsule by mouth daily., Disp: , Rfl:   •  OZEMPIC 0.25 mg or 0.5 mg (2 mg/3 mL) subcutaneous injection, INJECT 0.5 MG EVERY 7 DAYS, Disp: , Rfl:   •  pen needle, diabetic (NOVOFINE PLUS) 32 gauge x 1/6\" needle, Use with ozempic weekly, Disp: 100 each, Rfl: 0  •  ramipriL (ALTACE) 2.5 mg capsule, Take 1 capsule (2.5 mg total) by mouth once daily., Disp: 90 capsule, Rfl: 1  •  semaglutide (OZEMPIC) 0.25 mg or 0.5 mg(2 mg/1.5 mL) subcutaneous injection, Inject 0.5 mg under the skin every (seven) 7 days., Disp: 3 mL, Rfl: 3  •  triamterene-hydrochlorothiazid (DYAZIDE) 37.5-25 mg per capsule, Take 1 capsule by mouth every morning., " Disp: 90 capsule, Rfl: 1      BP Readings from Last 3 Encounters:   01/22/24 118/72   10/24/23 130/80   07/17/23 120/70       Recent Lab results:  Lab Results   Component Value Date    CHOL 134 01/30/2024   ,   Lab Results   Component Value Date    HDL 46 (L) 01/30/2024   ,   Lab Results   Component Value Date    LDLCALC 66 01/30/2024   ,   Lab Results   Component Value Date    TRIG 141 01/30/2024        Lab Results   Component Value Date    GLUCOSE 89 01/30/2024   ,   Lab Results   Component Value Date    HGBA1C 5.2 01/30/2024         Lab Results   Component Value Date    CREATININE 0.62 01/30/2024       Lab Results   Component Value Date    TSH 3.28 01/30/2024           Lab Results   Component Value Date    HGBA1C 5.2 01/30/2024

## 2024-04-15 DIAGNOSIS — I10 ESSENTIAL HYPERTENSION: ICD-10-CM

## 2024-04-15 RX ORDER — RAMIPRIL 2.5 MG/1
2.5 CAPSULE ORAL DAILY
Qty: 90 CAPSULE | Refills: 1 | Status: SHIPPED | OUTPATIENT
Start: 2024-04-15 | End: 2024-10-10

## 2024-04-15 NOTE — TELEPHONE ENCOUNTER
"Medicine Refill Request    Last Office Visit: 1/22/2024   Last Consult Visit: Visit date not found  Last Telemedicine Visit: Visit date not found    Next Appointment: 7/22/2024      Current Outpatient Medications:     amoxicillin (AMOXIL) 500 mg capsule, TAKE 4 CAPSULES BY MOUTH ONE HOUR PRIOR TO DENTAL APPOINTMENT, Disp: , Rfl:     ascorbic acid (VITAMIN C) 250 mg tablet, Take 250 mg by mouth daily., Disp: , Rfl:     aspirin 81 mg enteric coated tablet, Take 81 mg by mouth daily., Disp: , Rfl:     atorvastatin (LIPITOR) 40 mg tablet, Take 1 tablet (40 mg total) by mouth daily., Disp: 90 tablet, Rfl: 3    b complex vitamins capsule, Take 1 capsule by mouth daily., Disp: , Rfl:     ergocalciferol, vitamin D2, (VITAMIN D2 ORAL), Take by mouth., Disp: , Rfl:     hydrocortisone 2.5 % cream, apply to affected area twice a day, Disp: 28.35 g, Rfl: 0    linaCLOtide (LINZESS) 290 mcg capsule, Take 1 capsule (290 mcg total) by mouth daily before breakfast., Disp: 90 capsule, Rfl: 1    LORazepam (ATIVAN) 1 mg tablet, Take 1 tablet by mouth 3 (three) times a day., Disp: , Rfl:     nystatin-triamcinolone (MYCOLOG II) 100,000-0.1 unit/g-% cream, Apply topically 2 (two) times a day as needed for itching or rash., Disp: 60 g, Rfl: 3    omega-3 fatty acids-fish oil 360-1,200 mg capsule, Take 1 capsule by mouth daily., Disp: , Rfl:     OZEMPIC 0.25 mg or 0.5 mg (2 mg/3 mL) subcutaneous injection, INJECT 0.5 MG EVERY 7 DAYS, Disp: , Rfl:     pen needle, diabetic (NOVOFINE PLUS) 32 gauge x 1/6\" needle, Use with ozempic weekly, Disp: 100 each, Rfl: 0    ramipriL (ALTACE) 2.5 mg capsule, Take 1 capsule (2.5 mg total) by mouth once daily., Disp: 90 capsule, Rfl: 1    semaglutide (OZEMPIC) 0.25 mg or 0.5 mg (2 mg/3 mL) subcutaneous injection, Inject 0.5 mg under the skin every (seven) 7 days., Disp: 3 mL, Rfl: 1    triamterene-hydrochlorothiazid (DYAZIDE) 37.5-25 mg per capsule, Take 1 capsule by mouth every morning., Disp: 90 capsule, " Rfl: 1      BP Readings from Last 3 Encounters:   01/22/24 118/72   10/24/23 130/80   07/17/23 120/70       Recent Lab results:  Lab Results   Component Value Date    CHOL 134 01/30/2024   ,   Lab Results   Component Value Date    HDL 46 (L) 01/30/2024   ,   Lab Results   Component Value Date    LDLCALC 66 01/30/2024   ,   Lab Results   Component Value Date    TRIG 141 01/30/2024        Lab Results   Component Value Date    GLUCOSE 89 01/30/2024   ,   Lab Results   Component Value Date    HGBA1C 5.2 01/30/2024         Lab Results   Component Value Date    CREATININE 0.62 01/30/2024       Lab Results   Component Value Date    TSH 3.28 01/30/2024           Lab Results   Component Value Date    HGBA1C 5.2 01/30/2024

## 2024-04-18 ENCOUNTER — OFFICE VISIT (OUTPATIENT)
Dept: CARDIOLOGY | Facility: CLINIC | Age: 69
End: 2024-04-18
Payer: COMMERCIAL

## 2024-04-18 VITALS
OXYGEN SATURATION: 95 % | SYSTOLIC BLOOD PRESSURE: 124 MMHG | DIASTOLIC BLOOD PRESSURE: 82 MMHG | HEIGHT: 61 IN | HEART RATE: 78 BPM | WEIGHT: 163 LBS | RESPIRATION RATE: 16 BRPM | BODY MASS INDEX: 30.78 KG/M2

## 2024-04-18 DIAGNOSIS — I25.10 CORONARY ARTERY DISEASE INVOLVING NATIVE CORONARY ARTERY OF NATIVE HEART WITHOUT ANGINA PECTORIS: Primary | ICD-10-CM

## 2024-04-18 DIAGNOSIS — I10 ESSENTIAL HYPERTENSION: ICD-10-CM

## 2024-04-18 DIAGNOSIS — E78.2 MIXED HYPERLIPIDEMIA: ICD-10-CM

## 2024-04-18 PROCEDURE — 3008F BODY MASS INDEX DOCD: CPT | Performed by: INTERNAL MEDICINE

## 2024-04-18 PROCEDURE — 99213 OFFICE O/P EST LOW 20 MIN: CPT | Performed by: INTERNAL MEDICINE

## 2024-04-18 PROCEDURE — 93000 ELECTROCARDIOGRAM COMPLETE: CPT | Performed by: INTERNAL MEDICINE

## 2024-04-18 NOTE — ASSESSMENT & PLAN NOTE
From 1/30/2024: Total cholesterol 134, triglycerides 141, HDL 46, LDL 66.  Low HDL with acceptable LDL.  Severe constipation with higher dose atorvastatin.   Comorbidities: Coronary atherosclerosis and diabetes mellitus.  --Continue high intensity statin: Atorvastatin 40 mg daily.  --Goal LDL less than 70 given comorbidities.  --Lifestyle modification discussed at length.

## 2024-04-18 NOTE — PROGRESS NOTES
" Elaina Daniel MD, [facility]  Cardiology    [facility]    [facility]  [facility] [address]  [address] [place] [address]    TEL  [phone]  [facility]     2024    Reason for visit: Cardiovascular follow-up.    Isabel Spivey is a 68 y.o. female who presents for cardiovascular follow-up.  Isabel has a past medical history of coronary artery disease, hypertension, hyperlipidemia, and diabetes mellitus.  I last saw her in the office on 2023 at which time she was feeling well and no changes were made.  She did note leg cramps that did not improve with holding statin.  I did advise she contact me if symptoms became more significant.    Interval history obtained from the patient and extensive review of all available medical records.  Her most recent labs were completed 2024 with LDL measuring 66 and hemoglobin A1c 5.2%.    Today, Isabel returns for follow-up and reports feeling overall well from a cardiovascular standpoint.   Her brother recently had an LAD infarction.   She is walking regularly, about a mile and a half for about hour. She is walking 3-4 days per week. She reports once needing to stop for a \"flipping\" sensation in her chest. This self-resolved, occurred maybe January.  She denies chest pain, shortness of breath, orthopnea, PND, lower extremity edema, palpitations, or syncope.    Past Medical History:  1. Coronary artery disease   2. Hypertension  3. Hyperlipidemia  4. Diabetes mellitus  5. Osteoarthritis  6. Obstetric history: , no complications    Past Surgical History:  1. Ovarian cysts resection: Right,   2. Carpal tunnel repair: Bilateral  3. Hysterectomy:   4. Colon resection secondary to complication of hysterectomy:   5. Total knee arthroplasty: Right,   6. Trigger finger release    Medications: Aspirin 81 mg daily, atorvastatin 40 mg daily, ramipril 2.5 mg daily, " "triamterene-HCTZ 37.5-25 mg as needed, semaglutide 0.5 mg subcu weekly, fish oil, vitamin C, D, and B.    Allergies: No known drug allergies    Social History: , Michael. 2 adult children. Former smoker, 1 pack per month, quit . Social alcohol use. Denies illicit drug use.    Family History: Reviewed and notable for unknown heart disease in her mother, who ultimately  of metastatic cancer at age 85.    Exam:  Objective   Vitals:    24 1039   BP: 124/82   Pulse: 78   Resp: 16   SpO2: 95%       Body mass index is 30.8 kg/m².  Physical Exam  Constitutional:       General: She is not in acute distress.  Cardiovascular:      Rate and Rhythm: Normal rate and regular rhythm.      Heart sounds: No murmur heard.  Pulmonary:      Effort: No respiratory distress.      Breath sounds: Normal breath sounds.   Musculoskeletal:      Right lower leg: No edema.      Left lower leg: No edema.   Skin:     General: Skin is warm.   Neurological:      Mental Status: She is alert.         Labs:  Lab Results   Component Value Date    WBC 6.8 2024    HGB 14.2 2024     2024    CHOL 134 2024    TRIG 141 2024    HDL 46 (L) 2024    ALT 22 2024    AST 22 2024     2024    K 4.5 2024    CREATININE 0.62 2024    TSH 3.28 2024    HGBA1C 5.2 2024    MICROALBUR 0.2 2024     Personally reviewed, my interpretation: LDL 66.  Improved A1c.  Normal renal and liver function.    Cardiovascular Studies:   1.  Coronary calcium score 18: Composite 562.  Left main 0.  .  .  LCx 169. Composite 562 is between the 95 and 100 percentile for females between the ages of 55 and 64, as per [employer] 2006 Database. This correlates with \"extensive atherosclerotic plaque\" with \"high likelihood of at least one significant coronary narrowing\".    EKG from today personally reviewed and shows sinus rhythm, poor R wave progression.    Assessment/Plan "   Problem List Items Addressed This Visit          Circulatory    Coronary artery disease involving native coronary artery of native heart without angina pectoris - Primary     Coronary calcium score 4/2/18: Left main 0.  .  .  LCx 169. Composite 562 is between the 95 and 100 percentile for females her age.  Asymptomatic.  --Continue aspirin and high intensity statin.  --Lifestyle modification discussed at length, particularly recommending improve dietary habits, increase regular aerobic activity, and weight loss.         Relevant Orders    ECG 12 LEAD-OFFICE PERFORMED (Completed)    ECG 12 LEAD-OFFICE PERFORMED (Completed)    Essential hypertension     Blood pressure acceptable, measuring 124/82 mmHg on my manual reading.   --Continue current regimen: Ramipril 2.5 mg daily and triamterene-HCTZ twice weekly.   --Goal blood pressure less than 130/80 mmHg.  --Lifestyle modification discussed.            Other    Mixed hyperlipidemia     From 1/30/2024: Total cholesterol 134, triglycerides 141, HDL 46, LDL 66.  Low HDL with acceptable LDL.  Severe constipation with higher dose atorvastatin.   Comorbidities: Coronary atherosclerosis and diabetes mellitus.  --Continue high intensity statin: Atorvastatin 40 mg daily.  --Goal LDL less than 70 given comorbidities.  --Lifestyle modification discussed at length.                 This letter was generated using speech recognition software. Please excuse any typographical errors.    Return in about 1 year (around 4/18/2025).        [facility] Daniel MD, [facility]

## 2024-04-18 NOTE — ASSESSMENT & PLAN NOTE
Blood pressure acceptable, measuring 124/82 mmHg on my manual reading.   --Continue current regimen: Ramipril 2.5 mg daily and triamterene-HCTZ twice weekly.   --Goal blood pressure less than 130/80 mmHg.  --Lifestyle modification discussed.

## 2024-05-13 DIAGNOSIS — E78.2 DM TYPE 2 WITH DIABETIC MIXED HYPERLIPIDEMIA (CMS/HCC)  (CMS/HCC): ICD-10-CM

## 2024-05-13 DIAGNOSIS — E66.811 CLASS 1 OBESITY DUE TO EXCESS CALORIES WITH SERIOUS COMORBIDITY AND BODY MASS INDEX (BMI) OF 32.0 TO 32.9 IN ADULT: ICD-10-CM

## 2024-05-13 DIAGNOSIS — E11.69 DM TYPE 2 WITH DIABETIC MIXED HYPERLIPIDEMIA (CMS/HCC)  (CMS/HCC): ICD-10-CM

## 2024-05-13 DIAGNOSIS — E66.09 CLASS 1 OBESITY DUE TO EXCESS CALORIES WITH SERIOUS COMORBIDITY AND BODY MASS INDEX (BMI) OF 32.0 TO 32.9 IN ADULT: ICD-10-CM

## 2024-05-13 RX ORDER — SEMAGLUTIDE 0.68 MG/ML
0.5 INJECTION, SOLUTION SUBCUTANEOUS
Qty: 3 ML | Refills: 1 | Status: SHIPPED | OUTPATIENT
Start: 2024-05-13

## 2024-05-13 NOTE — TELEPHONE ENCOUNTER
"Medicine Refill Request    Last Office Visit: 1/22/2024   Last Consult Visit: Visit date not found  Last Telemedicine Visit: Visit date not found    Next Appointment: 7/22/2024      Current Outpatient Medications:     amoxicillin (AMOXIL) 500 mg capsule, TAKE 4 CAPSULES BY MOUTH ONE HOUR PRIOR TO DENTAL APPOINTMENT, Disp: , Rfl:     ascorbic acid (VITAMIN C) 250 mg tablet, Take 250 mg by mouth daily., Disp: , Rfl:     aspirin 81 mg enteric coated tablet, Take 81 mg by mouth daily., Disp: , Rfl:     atorvastatin (LIPITOR) 40 mg tablet, Take 1 tablet (40 mg total) by mouth daily., Disp: 90 tablet, Rfl: 3    b complex vitamins capsule, Take 1 capsule by mouth daily., Disp: , Rfl:     ergocalciferol, vitamin D2, (VITAMIN D2 ORAL), Take by mouth., Disp: , Rfl:     hydrocortisone 2.5 % cream, apply to affected area twice a day, Disp: 28.35 g, Rfl: 0    linaCLOtide (LINZESS) 290 mcg capsule, Take 1 capsule (290 mcg total) by mouth daily before breakfast., Disp: 90 capsule, Rfl: 1    LORazepam (ATIVAN) 1 mg tablet, Take 1 tablet by mouth 3 (three) times a day., Disp: , Rfl:     nystatin-triamcinolone (MYCOLOG II) 100,000-0.1 unit/g-% cream, Apply topically 2 (two) times a day as needed for itching or rash., Disp: 60 g, Rfl: 3    omega-3 fatty acids-fish oil 360-1,200 mg capsule, Take 1 capsule by mouth daily., Disp: , Rfl:     OZEMPIC 0.25 mg or 0.5 mg (2 mg/3 mL) subcutaneous injection, INJECT 0.5 MG EVERY 7 DAYS, Disp: , Rfl:     pen needle, diabetic (NOVOFINE PLUS) 32 gauge x 1/6\" needle, Use with ozempic weekly, Disp: 100 each, Rfl: 0    ramipriL (ALTACE) 2.5 mg capsule, Take 1 capsule (2.5 mg total) by mouth daily., Disp: 90 capsule, Rfl: 1    semaglutide (OZEMPIC) 0.25 mg or 0.5 mg (2 mg/3 mL) subcutaneous injection, Inject 0.5 mg under the skin every (seven) 7 days., Disp: 3 mL, Rfl: 1    triamterene-hydrochlorothiazid (DYAZIDE) 37.5-25 mg per capsule, Take 1 capsule by mouth every morning., Disp: 90 capsule, Rfl: " 1      BP Readings from Last 3 Encounters:   04/18/24 124/82   01/22/24 118/72   10/24/23 130/80       Recent Lab results:  Lab Results   Component Value Date    CHOL 134 01/30/2024   ,   Lab Results   Component Value Date    HDL 46 (L) 01/30/2024   ,   Lab Results   Component Value Date    LDLCALC 66 01/30/2024   ,   Lab Results   Component Value Date    TRIG 141 01/30/2024        Lab Results   Component Value Date    GLUCOSE 89 01/30/2024   ,   Lab Results   Component Value Date    HGBA1C 5.2 01/30/2024         Lab Results   Component Value Date    CREATININE 0.62 01/30/2024       Lab Results   Component Value Date    TSH 3.28 01/30/2024           Lab Results   Component Value Date    HGBA1C 5.2 01/30/2024

## 2024-07-10 DIAGNOSIS — E78.2 MIXED HYPERLIPIDEMIA: ICD-10-CM

## 2024-07-10 RX ORDER — ATORVASTATIN CALCIUM 40 MG/1
40 TABLET, FILM COATED ORAL DAILY
Qty: 90 TABLET | Refills: 3 | Status: SHIPPED | OUTPATIENT
Start: 2024-07-10

## 2024-07-22 ENCOUNTER — OFFICE VISIT (OUTPATIENT)
Dept: FAMILY MEDICINE | Facility: CLINIC | Age: 69
End: 2024-07-22
Payer: COMMERCIAL

## 2024-07-22 VITALS
HEIGHT: 61 IN | SYSTOLIC BLOOD PRESSURE: 116 MMHG | HEART RATE: 76 BPM | OXYGEN SATURATION: 97 % | WEIGHT: 164.38 LBS | TEMPERATURE: 97.9 F | DIASTOLIC BLOOD PRESSURE: 78 MMHG | RESPIRATION RATE: 16 BRPM | BODY MASS INDEX: 31.03 KG/M2

## 2024-07-22 DIAGNOSIS — E78.2 MIXED HYPERLIPIDEMIA: ICD-10-CM

## 2024-07-22 DIAGNOSIS — K59.04 FUNCTIONAL CONSTIPATION: ICD-10-CM

## 2024-07-22 DIAGNOSIS — I10 ESSENTIAL HYPERTENSION: ICD-10-CM

## 2024-07-22 DIAGNOSIS — I25.10 CORONARY ARTERY DISEASE INVOLVING NATIVE CORONARY ARTERY OF NATIVE HEART WITHOUT ANGINA PECTORIS: ICD-10-CM

## 2024-07-22 DIAGNOSIS — E78.2 DM TYPE 2 WITH DIABETIC MIXED HYPERLIPIDEMIA (CMS/HCC)  (CMS/HCC): Primary | ICD-10-CM

## 2024-07-22 DIAGNOSIS — E11.69 DM TYPE 2 WITH DIABETIC MIXED HYPERLIPIDEMIA (CMS/HCC)  (CMS/HCC): Primary | ICD-10-CM

## 2024-07-22 PROCEDURE — 99214 OFFICE O/P EST MOD 30 MIN: CPT | Performed by: INTERNAL MEDICINE

## 2024-07-22 PROCEDURE — 3008F BODY MASS INDEX DOCD: CPT | Performed by: INTERNAL MEDICINE

## 2024-07-22 RX ORDER — SEMAGLUTIDE 0.68 MG/ML
0.5 INJECTION, SOLUTION SUBCUTANEOUS
Qty: 3 ML | Refills: 3 | Status: SHIPPED | OUTPATIENT
Start: 2024-07-22 | End: 2024-12-11

## 2024-07-22 RX ORDER — ESTRADIOL 0.1 MG/G
CREAM VAGINAL
COMMUNITY
Start: 2024-05-25

## 2024-07-22 RX ORDER — LORAZEPAM 1 MG/1
1 TABLET ORAL 3 TIMES DAILY
Qty: 30 TABLET | Refills: 0 | Status: SHIPPED | OUTPATIENT
Start: 2024-07-22

## 2024-07-22 ASSESSMENT — ENCOUNTER SYMPTOMS
NEUROLOGICAL NEGATIVE: 1
CARDIOVASCULAR NEGATIVE: 1
CONSTITUTIONAL NEGATIVE: 1
HEMATOLOGIC/LYMPHATIC NEGATIVE: 1
PSYCHIATRIC NEGATIVE: 1
GASTROINTESTINAL NEGATIVE: 1
MUSCULOSKELETAL NEGATIVE: 1
ALLERGIC/IMMUNOLOGIC NEGATIVE: 1
RESPIRATORY NEGATIVE: 1
EYES NEGATIVE: 1
ENDOCRINE NEGATIVE: 1

## 2024-07-22 NOTE — PROGRESS NOTES
Isabel Spivey is a 69 y.o. female     68 y/o female presents for follow up   Continues to follow with GI - Dr. Knight   Taking Magnesium and Senna instead of Linzess   Some improved but not fully affective     HTN - no headaches, dizziness, lightheadedness, CP  Checking at home   Tolerating meds without side effects     DM - well controlled   Lab Results       Component                Value               Date                       HGBA1C                   5.2                 2024            Although reduced activity -  seeing ortho - PRP recently    Lipids on statin          Past Medical History:   Diagnosis Date    Arthritis     Hypertension     Lipid disorder     Type 2 diabetes mellitus (CMS/HCC)        Past Surgical History:   Procedure Laterality Date    CARPAL TUNNEL RELEASE Bilateral     COLON SURGERY      HYSTERECTOMY      total    JOINT REPLACEMENT Right     knee    OOPHORECTOMY Right     TRIGGER FINGER RELEASE Right     middle finger        Social History     Socioeconomic History    Marital status:      Spouse name: None    Number of children: None    Years of education: None    Highest education level: None   Tobacco Use    Smoking status: Former     Types: Cigarettes     Quit date:      Years since quittin.5    Smokeless tobacco: Never   Vaping Use    Vaping Use: Never used   Substance and Sexual Activity    Alcohol use: Yes     Comment: ocassionally    Drug use: No    Sexual activity: Yes     Partners: Male       History reviewed. No pertinent family history.    No known drug allergies    Current Outpatient Medications   Medication Sig Dispense Refill    amoxicillin (AMOXIL) 500 mg capsule TAKE 4 CAPSULES BY MOUTH ONE HOUR PRIOR TO DENTAL APPOINTMENT      ascorbic acid (VITAMIN C) 250 mg tablet Take 250 mg by mouth daily.      aspirin 81 mg enteric coated tablet Take 81 mg by mouth daily.      atorvastatin (LIPITOR) 40 mg tablet Take 1 tablet (40 mg total) by mouth daily. 90 tablet  "3    b complex vitamins capsule Take 1 capsule by mouth daily.      ergocalciferol, vitamin D2, (VITAMIN D2 ORAL) Take by mouth.      hydrocortisone 2.5 % cream apply to affected area twice a day 28.35 g 0    linaCLOtide (LINZESS) 290 mcg capsule Take 1 capsule (290 mcg total) by mouth daily before breakfast. 90 capsule 1    LORazepam (ATIVAN) 1 mg tablet Take 1 tablet by mouth 3 (three) times a day.      nystatin-triamcinolone (MYCOLOG II) 100,000-0.1 unit/g-% cream Apply topically 2 (two) times a day as needed for itching or rash. 60 g 3    omega-3 fatty acids-fish oil 360-1,200 mg capsule Take 1 capsule by mouth daily.      OZEMPIC 0.25 mg or 0.5 mg (2 mg/3 mL) subcutaneous injection INJECT 0.5 MG EVERY 7 DAYS      pen needle, diabetic (NOVOFINE PLUS) 32 gauge x 1/6\" needle Use with ozempic weekly 100 each 0    ramipriL (ALTACE) 2.5 mg capsule Take 1 capsule (2.5 mg total) by mouth daily. 90 capsule 1    semaglutide (OZEMPIC) 0.25 mg or 0.5 mg (2 mg/3 mL) subcutaneous injection Inject 0.5 mg under the skin every (seven) 7 days. 3 mL 1    triamterene-hydrochlorothiazid (DYAZIDE) 37.5-25 mg per capsule Take 1 capsule by mouth every morning. 90 capsule 1     No current facility-administered medications for this visit.       Review of Systems   Constitutional: Negative.    HENT: Negative.     Eyes: Negative.    Respiratory: Negative.     Cardiovascular: Negative.    Gastrointestinal: Negative.    Endocrine: Negative.    Genitourinary: Negative.    Musculoskeletal: Negative.    Skin: Negative.    Allergic/Immunologic: Negative.    Neurological: Negative.    Hematological: Negative.    Psychiatric/Behavioral: Negative.            Vitals:    07/22/24 0836   BP: 116/78   Pulse: 76   Resp: 16   Temp: 36.6 °C (97.9 °F)   SpO2: 97%       Body mass index is 31.57 kg/m².      Physical Exam  Constitutional:       Appearance: Normal appearance.   HENT:      Right Ear: Tympanic membrane, ear canal and external ear normal.      " Left Ear: Tympanic membrane, ear canal and external ear normal.   Eyes:      Extraocular Movements: Extraocular movements intact.      Conjunctiva/sclera: Conjunctivae normal.      Pupils: Pupils are equal, round, and reactive to light.      Comments: anicteric no pallor         Cardiovascular:      Rate and Rhythm: Normal rate and regular rhythm.      Pulses: Normal pulses.      Heart sounds: Normal heart sounds. No murmur heard.  Pulmonary:      Effort: Pulmonary effort is normal.      Breath sounds: Normal breath sounds. No wheezing or rhonchi.   Abdominal:      General: Abdomen is flat. Bowel sounds are normal.      Palpations: Abdomen is soft. There is no mass.      Tenderness: There is no abdominal tenderness.   Musculoskeletal:      Right lower leg: No edema.      Left lower leg: No edema.   Neurological:      General: No focal deficit present.      Mental Status: She is alert and oriented to person, place, and time.   Psychiatric:         Mood and Affect: Mood normal.         Behavior: Behavior normal.          Assessment/Plan      Diagnoses and all orders for this visit:    DM type 2 with diabetic mixed hyperlipidemia (CMS/HCC)  (CMS/HCC) (Primary)  -     Hemoglobin A1c; Future  -     Comprehensive metabolic panel; Future  -     CBC and differential; Future  -     Lipid panel; Future  -     Microalbumin / creatinine urine ratio; Future  Update labs including A1c and lipids  Essential hypertension  At goal continue current meds  Mixed hyperlipidemia  -     TSH w reflex FT4; Future    Coronary artery disease involving native coronary artery of native heart without angina pectoris  Stable without anginal symptoms follows cardiology  Functional constipation  Following GI some benefit with senna and magnesium  Other orders  -     OZEMPIC 0.25 mg or 0.5 mg (2 mg/3 mL) subcutaneous injection; Inject 0.5 mg under the skin every (seven) 7 days.          No orders of the defined types were placed in this  encounter.      There are no discontinued medications.     No orders of the defined types were placed in this encounter.       John Bowens, DO  7/22/2024

## 2024-08-12 ENCOUNTER — TRANSCRIBE ORDERS (OUTPATIENT)
Dept: SCHEDULING | Age: 69
End: 2024-08-12

## 2024-08-12 DIAGNOSIS — S83.207A UNSPECIFIED TEAR OF UNSPECIFIED MENISCUS, CURRENT INJURY, LEFT KNEE, INITIAL ENCOUNTER: Primary | ICD-10-CM

## 2024-08-20 LAB
ALBUMIN SERPL-MCNC: 4 G/DL (ref 3.6–5.1)
ALBUMIN/GLOB SERPL: 1.6 (CALC) (ref 1–2.5)
ALP SERPL-CCNC: 110 U/L (ref 37–153)
ALT SERPL-CCNC: 20 U/L (ref 6–29)
AST SERPL-CCNC: 21 U/L (ref 10–35)
BASOPHILS # BLD AUTO: 68 CELLS/UL (ref 0–200)
BASOPHILS NFR BLD AUTO: 0.9 %
BILIRUB SERPL-MCNC: 0.6 MG/DL (ref 0.2–1.2)
BUN SERPL-MCNC: 16 MG/DL (ref 7–25)
BUN/CREAT SERPL: NORMAL (CALC) (ref 6–22)
CALCIUM SERPL-MCNC: 9.6 MG/DL (ref 8.6–10.4)
CHLORIDE SERPL-SCNC: 104 MMOL/L (ref 98–110)
CHOLEST SERPL-MCNC: 140 MG/DL
CHOLEST/HDLC SERPL: 2.9 (CALC)
CO2 SERPL-SCNC: 29 MMOL/L (ref 20–32)
CREAT SERPL-MCNC: 0.7 MG/DL (ref 0.6–1)
EGFRCR SERPLBLD CKD-EPI 2021: 92 ML/MIN/1.73M2
EOSINOPHIL # BLD AUTO: 210 CELLS/UL (ref 15–500)
EOSINOPHIL NFR BLD AUTO: 2.8 %
ERYTHROCYTE [DISTWIDTH] IN BLOOD BY AUTOMATED COUNT: 12.4 % (ref 11–15)
GLOBULIN SER CALC-MCNC: 2.5 G/DL (CALC) (ref 1.9–3.7)
GLUCOSE SERPL-MCNC: 96 MG/DL (ref 65–99)
HBA1C MFR BLD: 5.5 % OF TOTAL HGB
HCT VFR BLD AUTO: 44.1 % (ref 35–45)
HDLC SERPL-MCNC: 49 MG/DL
HGB BLD-MCNC: 14.3 G/DL (ref 11.7–15.5)
LDLC SERPL CALC-MCNC: 68 MG/DL (CALC)
LYMPHOCYTES # BLD AUTO: 1583 CELLS/UL (ref 850–3900)
LYMPHOCYTES NFR BLD AUTO: 21.1 %
MCH RBC QN AUTO: 29.5 PG (ref 27–33)
MCHC RBC AUTO-ENTMCNC: 32.4 G/DL (ref 32–36)
MCV RBC AUTO: 90.9 FL (ref 80–100)
MONOCYTES # BLD AUTO: 623 CELLS/UL (ref 200–950)
MONOCYTES NFR BLD AUTO: 8.3 %
NEUTROPHILS # BLD AUTO: 5018 CELLS/UL (ref 1500–7800)
NEUTROPHILS NFR BLD AUTO: 66.9 %
NONHDLC SERPL-MCNC: 91 MG/DL (CALC)
PLATELET # BLD AUTO: 268 THOUSAND/UL (ref 140–400)
PMV BLD REES-ECKER: 9.6 FL (ref 7.5–12.5)
POTASSIUM SERPL-SCNC: 4.8 MMOL/L (ref 3.5–5.3)
PROT SERPL-MCNC: 6.5 G/DL (ref 6.1–8.1)
RBC # BLD AUTO: 4.85 MILLION/UL (ref 3.8–5.1)
SODIUM SERPL-SCNC: 140 MMOL/L (ref 135–146)
TRIGL SERPL-MCNC: 143 MG/DL
TSH SERPL-ACNC: 2.58 MIU/L (ref 0.4–4.5)
WBC # BLD AUTO: 7.5 THOUSAND/UL (ref 3.8–10.8)

## 2024-08-22 ENCOUNTER — HOSPITAL ENCOUNTER (OUTPATIENT)
Dept: RADIOLOGY | Facility: CLINIC | Age: 69
Discharge: HOME | End: 2024-08-22
Attending: ORTHOPAEDIC SURGERY
Payer: COMMERCIAL

## 2024-08-22 DIAGNOSIS — S83.207A UNSPECIFIED TEAR OF UNSPECIFIED MENISCUS, CURRENT INJURY, LEFT KNEE, INITIAL ENCOUNTER: ICD-10-CM

## 2024-10-10 DIAGNOSIS — I10 ESSENTIAL HYPERTENSION: ICD-10-CM

## 2024-10-10 RX ORDER — RAMIPRIL 2.5 MG/1
2.5 CAPSULE ORAL DAILY
Qty: 90 CAPSULE | Refills: 1 | Status: SHIPPED | OUTPATIENT
Start: 2024-10-10

## 2024-10-10 NOTE — TELEPHONE ENCOUNTER
"Medicine Refill Request    Last Office Visit: 7/22/2024   Last Consult Visit: Visit date not found  Last Telemedicine Visit: Visit date not found    Next Appointment: 1/23/2025      Current Outpatient Medications:     amoxicillin (AMOXIL) 500 mg capsule, TAKE 4 CAPSULES BY MOUTH ONE HOUR PRIOR TO DENTAL APPOINTMENT, Disp: , Rfl:     ascorbic acid (VITAMIN C) 250 mg tablet, Take 250 mg by mouth daily., Disp: , Rfl:     aspirin 81 mg enteric coated tablet, Take 81 mg by mouth daily., Disp: , Rfl:     atorvastatin (LIPITOR) 40 mg tablet, Take 1 tablet (40 mg total) by mouth daily., Disp: 90 tablet, Rfl: 3    b complex vitamins capsule, Take 1 capsule by mouth daily., Disp: , Rfl:     ergocalciferol, vitamin D2, (VITAMIN D2 ORAL), Take by mouth., Disp: , Rfl:     estradioL (ESTRACE) 0.01 % (0.1 mg/gram) vaginal cream, insert 1 gram vaginally 2 TO 3 NIGHTS PER WEEK, Disp: , Rfl:     hydrocortisone 2.5 % cream, apply to affected area twice a day, Disp: 28.35 g, Rfl: 0    linaCLOtide (LINZESS) 290 mcg capsule, Take 1 capsule (290 mcg total) by mouth daily before breakfast., Disp: 90 capsule, Rfl: 1    LORazepam (ATIVAN) 1 mg tablet, Take 1 tablet (1 mg total) by mouth 3 (three) times a day., Disp: 30 tablet, Rfl: 0    nystatin-triamcinolone (MYCOLOG II) 100,000-0.1 unit/g-% cream, Apply topically 2 (two) times a day as needed for itching or rash., Disp: 60 g, Rfl: 3    omega-3 fatty acids-fish oil 360-1,200 mg capsule, Take 1 capsule by mouth daily., Disp: , Rfl:     OZEMPIC 0.25 mg or 0.5 mg (2 mg/3 mL) subcutaneous injection, Inject 0.5 mg under the skin every (seven) 7 days., Disp: 3 mL, Rfl: 3    pen needle, diabetic (NOVOFINE PLUS) 32 gauge x 1/6\" needle, Use with ozempic weekly, Disp: 100 each, Rfl: 0    ramipriL (ALTACE) 2.5 mg capsule, Take 1 capsule (2.5 mg total) by mouth daily., Disp: 90 capsule, Rfl: 1    semaglutide (OZEMPIC) 0.25 mg or 0.5 mg (2 mg/3 mL) subcutaneous injection, Inject 0.5 mg under the skin " every (seven) 7 days., Disp: 3 mL, Rfl: 1    triamterene-hydrochlorothiazid (DYAZIDE) 37.5-25 mg per capsule, Take 1 capsule by mouth every morning., Disp: 90 capsule, Rfl: 1      BP Readings from Last 3 Encounters:   07/22/24 116/78   04/18/24 124/82   01/22/24 118/72       Recent Lab results:  Lab Results   Component Value Date    CHOL 140 08/19/2024   ,   Lab Results   Component Value Date    HDL 49 (L) 08/19/2024   ,   Lab Results   Component Value Date    LDLCALC 68 08/19/2024   ,   Lab Results   Component Value Date    TRIG 143 08/19/2024        Lab Results   Component Value Date    GLUCOSE 96 08/19/2024   ,   Lab Results   Component Value Date    HGBA1C 5.5 08/19/2024         Lab Results   Component Value Date    CREATININE 0.70 08/19/2024       Lab Results   Component Value Date    TSH 2.58 08/19/2024           Lab Results   Component Value Date    HGBA1C 5.5 08/19/2024

## 2024-10-15 ENCOUNTER — HOSPITAL ENCOUNTER (OUTPATIENT)
Dept: RADIOLOGY | Facility: CLINIC | Age: 69
Discharge: HOME | End: 2024-10-15
Attending: OBSTETRICS & GYNECOLOGY
Payer: COMMERCIAL

## 2024-10-15 DIAGNOSIS — Z12.31 ENCOUNTER FOR SCREENING MAMMOGRAM FOR MALIGNANT NEOPLASM OF BREAST: ICD-10-CM

## 2024-10-15 PROCEDURE — 77063 BREAST TOMOSYNTHESIS BI: CPT

## 2024-12-10 LAB
ALBUMIN/CREAT UR: NORMAL MG/G CREAT
CREAT UR-MCNC: 67 MG/DL (ref 20–275)
MICROALBUMIN UR-MCNC: <0.2 MG/DL

## 2024-12-11 RX ORDER — SEMAGLUTIDE 0.68 MG/ML
0.5 INJECTION, SOLUTION SUBCUTANEOUS
Qty: 3 ML | Refills: 2 | Status: SHIPPED | OUTPATIENT
Start: 2024-12-11 | End: 2025-02-03 | Stop reason: SDUPTHER

## 2024-12-11 RX ORDER — SEMAGLUTIDE 0.68 MG/ML
0.5 INJECTION, SOLUTION SUBCUTANEOUS
Qty: 3 ML | Refills: 3 | OUTPATIENT
Start: 2024-12-11

## 2025-01-16 SDOH — ECONOMIC STABILITY: INCOME INSECURITY: IN THE LAST 12 MONTHS, WAS THERE A TIME WHEN YOU WERE NOT ABLE TO PAY THE MORTGAGE OR RENT ON TIME?: NO

## 2025-01-16 SDOH — ECONOMIC STABILITY: FOOD INSECURITY: WITHIN THE PAST 12 MONTHS, THE FOOD YOU BOUGHT JUST DIDN'T LAST AND YOU DIDN'T HAVE MONEY TO GET MORE.: NEVER TRUE

## 2025-01-16 SDOH — ECONOMIC STABILITY: FOOD INSECURITY: WITHIN THE PAST 12 MONTHS, YOU WORRIED THAT YOUR FOOD WOULD RUN OUT BEFORE YOU GOT MONEY TO BUY MORE.: NEVER TRUE

## 2025-01-16 SDOH — ECONOMIC STABILITY: TRANSPORTATION INSECURITY
IN THE PAST 12 MONTHS, HAS THE LACK OF TRANSPORTATION KEPT YOU FROM MEDICAL APPOINTMENTS OR FROM GETTING MEDICATIONS?: NO

## 2025-01-16 SDOH — ECONOMIC STABILITY: TRANSPORTATION INSECURITY
IN THE PAST 12 MONTHS, HAS LACK OF TRANSPORTATION KEPT YOU FROM MEETINGS, WORK, OR FROM GETTING THINGS NEEDED FOR DAILY LIVING?: NO

## 2025-01-16 ASSESSMENT — SOCIAL DETERMINANTS OF HEALTH (SDOH): IN THE PAST 12 MONTHS, HAS THE ELECTRIC, GAS, OIL, OR WATER COMPANY THREATENED TO SHUT OFF SERVICE IN YOUR HOME?: NO

## 2025-01-23 ENCOUNTER — OFFICE VISIT (OUTPATIENT)
Dept: FAMILY MEDICINE | Facility: CLINIC | Age: 70
End: 2025-01-23
Payer: COMMERCIAL

## 2025-01-23 VITALS
WEIGHT: 161.13 LBS | SYSTOLIC BLOOD PRESSURE: 120 MMHG | BODY MASS INDEX: 30.42 KG/M2 | HEART RATE: 81 BPM | RESPIRATION RATE: 18 BRPM | HEIGHT: 61 IN | DIASTOLIC BLOOD PRESSURE: 74 MMHG | OXYGEN SATURATION: 97 % | TEMPERATURE: 97.9 F

## 2025-01-23 DIAGNOSIS — E78.2 MIXED HYPERLIPIDEMIA: ICD-10-CM

## 2025-01-23 DIAGNOSIS — I25.10 CORONARY ARTERY DISEASE INVOLVING NATIVE CORONARY ARTERY OF NATIVE HEART WITHOUT ANGINA PECTORIS: ICD-10-CM

## 2025-01-23 DIAGNOSIS — E11.69 DM TYPE 2 WITH DIABETIC MIXED HYPERLIPIDEMIA (CMS/HCC)  (CMS/HCC): ICD-10-CM

## 2025-01-23 DIAGNOSIS — Z00.00 ENCOUNTER FOR GENERAL ADULT MEDICAL EXAMINATION WITHOUT ABNORMAL FINDINGS: Primary | ICD-10-CM

## 2025-01-23 DIAGNOSIS — I10 ESSENTIAL HYPERTENSION: ICD-10-CM

## 2025-01-23 DIAGNOSIS — E78.2 DM TYPE 2 WITH DIABETIC MIXED HYPERLIPIDEMIA (CMS/HCC)  (CMS/HCC): ICD-10-CM

## 2025-01-23 PROCEDURE — 99397 PER PM REEVAL EST PAT 65+ YR: CPT | Performed by: INTERNAL MEDICINE

## 2025-01-23 PROCEDURE — 3008F BODY MASS INDEX DOCD: CPT | Performed by: INTERNAL MEDICINE

## 2025-01-23 ASSESSMENT — PATIENT HEALTH QUESTIONNAIRE - PHQ9: SUM OF ALL RESPONSES TO PHQ9 QUESTIONS 1 & 2: 0

## 2025-01-23 NOTE — PROGRESS NOTES
Isabel Spivey is a 69 y.o. female     68 y/o female presents for CPE   Has derm appt - left shoulder skin lesion     DM - well controlled   Lab Results       Component                Value               Date                       HGBA1C                   5.5                 08/19/2024            Generally feeling well no acute issues concerns    Vision no changes follows with Optho  Dental every 6 months  NO CP, SOB   Activity staying very active  Memory no cognitive concerns  Mood generally good  Bowel, bladder no issues  Diet health-conscious  Sleeping well     HTN - no headaches, dizziness, lightheadedness, CP  Checking at home   Tolerating meds without side effects               Past Medical History:   Diagnosis Date    Arthritis     Hypertension     Lipid disorder     Type 2 diabetes mellitus (CMS/HCC)        Past Surgical History   Procedure Laterality Date    Carpal tunnel release Bilateral     Colon surgery      Hysterectomy      total    Joint replacement Right     knee    Oophorectomy Right     Trigger finger release Right     middle finger        Social History     Socioeconomic History    Marital status:      Spouse name: None    Number of children: None    Years of education: None    Highest education level: None   Tobacco Use    Smoking status: Former     Current packs/day: 0.00     Types: Cigarettes     Quit date: 2010     Years since quitting: 15.0    Smokeless tobacco: Never   Vaping Use    Vaping status: Never Used   Substance and Sexual Activity    Alcohol use: Yes     Comment: ocassionally    Drug use: No    Sexual activity: Yes     Partners: Male     Social Drivers of Health     Food Insecurity: No Food Insecurity (1/16/2025)    Hunger Vital Sign     Worried About Running Out of Food in the Last Year: Never true     Ran Out of Food in the Last Year: Never true   Transportation Needs: No Transportation Needs (1/16/2025)    PRAPARE - Transportation     Lack of Transportation (Medical): No  "    Lack of Transportation (Non-Medical): No   Housing Stability: Unknown (1/16/2025)    Housing Stability Vital Sign     Unable to Pay for Housing in the Last Year: No     Homeless in the Last Year: No       No family history on file.    No known drug allergies    Current Outpatient Medications   Medication Sig Dispense Refill    amoxicillin (AMOXIL) 500 mg capsule TAKE 4 CAPSULES BY MOUTH ONE HOUR PRIOR TO DENTAL APPOINTMENT      ascorbic acid (VITAMIN C) 250 mg tablet Take 250 mg by mouth daily.      aspirin 81 mg enteric coated tablet Take 81 mg by mouth daily.      atorvastatin (LIPITOR) 40 mg tablet Take 1 tablet (40 mg total) by mouth daily. 90 tablet 3    b complex vitamins capsule Take 1 capsule by mouth daily.      ergocalciferol, vitamin D2, (VITAMIN D2 ORAL) Take by mouth.      estradioL (ESTRACE) 0.01 % (0.1 mg/gram) vaginal cream insert 1 gram vaginally 2 TO 3 NIGHTS PER WEEK      hydrocortisone 2.5 % cream apply to affected area twice a day 28.35 g 0    linaCLOtide (LINZESS) 290 mcg capsule Take 1 capsule (290 mcg total) by mouth daily before breakfast. 90 capsule 1    LORazepam (ATIVAN) 1 mg tablet Take 1 tablet (1 mg total) by mouth 3 (three) times a day. 30 tablet 0    nystatin-triamcinolone (MYCOLOG II) 100,000-0.1 unit/g-% cream Apply topically 2 (two) times a day as needed for itching or rash. 60 g 3    omega-3 fatty acids-fish oil 360-1,200 mg capsule Take 1 capsule by mouth daily.      OZEMPIC 0.25 mg or 0.5 mg (2 mg/3 mL) subcutaneous injection Inject 0.5 mg under the skin every (seven) 7 days. 3 mL 2    pen needle, diabetic (NOVOFINE PLUS) 32 gauge x 1/6\" needle Use with ozempic weekly 100 each 0    ramipriL (ALTACE) 2.5 mg capsule Take 1 capsule (2.5 mg total) by mouth daily. 90 capsule 1    semaglutide (OZEMPIC) 0.25 mg or 0.5 mg (2 mg/3 mL) subcutaneous injection Inject 0.5 mg under the skin every (seven) 7 days. 3 mL 1    triamterene-hydrochlorothiazid (DYAZIDE) 37.5-25 mg per capsule " Take 1 capsule by mouth every morning. 90 capsule 1     No current facility-administered medications for this visit.       Review of Systems   Constitutional: Negative.    HENT: Negative.     Eyes: Negative.    Respiratory: Negative.     Cardiovascular: Negative.    Gastrointestinal: Negative.    Endocrine: Negative.    Genitourinary: Negative.    Musculoskeletal: Negative.    Skin: Negative.    Allergic/Immunologic: Negative.    Neurological: Negative.    Hematological: Negative.    Psychiatric/Behavioral: Negative.            Vitals:    01/23/25 0932   BP: 120/74   Pulse: 81   Resp: 18   Temp: 36.6 °C (97.9 °F)   SpO2: 97%       Body mass index is 30.95 kg/m².      Physical Exam  Constitutional:       Appearance: Normal appearance.   HENT:      Right Ear: Tympanic membrane, ear canal and external ear normal.      Left Ear: Tympanic membrane, ear canal and external ear normal.   Eyes:      Extraocular Movements: Extraocular movements intact.      Conjunctiva/sclera: Conjunctivae normal.      Pupils: Pupils are equal, round, and reactive to light.      Comments: anicteric no pallor         Cardiovascular:      Rate and Rhythm: Normal rate and regular rhythm.      Pulses: Normal pulses.      Heart sounds: Normal heart sounds. No murmur heard.  Pulmonary:      Effort: Pulmonary effort is normal.      Breath sounds: Normal breath sounds. No wheezing or rhonchi.   Abdominal:      General: Abdomen is flat. Bowel sounds are normal.      Palpations: Abdomen is soft. There is no mass.      Tenderness: There is no abdominal tenderness.   Musculoskeletal:      Right lower leg: No edema.      Left lower leg: No edema.   Neurological:      General: No focal deficit present.      Mental Status: She is alert and oriented to person, place, and time.   Psychiatric:         Mood and Affect: Mood normal.         Behavior: Behavior normal.          Assessment/Plan      Diagnoses and all orders for this visit:    Encounter for general  adult medical examination without abnormal findings (Primary)  Immunizations UTD including COVID vaccine with booster   [place], Mammo, Dexa UTD   Update Labs     DM type 2 with diabetic mixed hyperlipidemia (CMS/HCC)  (CMS/HCC)  -     Hemoglobin A1c; Future  -     Comprehensive metabolic panel; Future  -     CBC and differential; Future  -     Lipid panel; Future  Well-controlled continue current medications update hemoglobin A1c and other labs  Mixed hyperlipidemia  -     TSH w reflex FT4; Future  On statin tolerating well update lipid profile     Essential hypertension  At goal   Coronary artery disease involving native coronary artery of native heart without angina pectoris  Stable no anginal equivalent symptoms        No orders of the defined types were placed in this encounter.      There are no discontinued medications.     No orders of the defined types were placed in this encounter.       John Bowens, DO  1/23/2025

## 2025-01-28 ASSESSMENT — ENCOUNTER SYMPTOMS
ALLERGIC/IMMUNOLOGIC NEGATIVE: 1
NEUROLOGICAL NEGATIVE: 1
RESPIRATORY NEGATIVE: 1
GASTROINTESTINAL NEGATIVE: 1
EYES NEGATIVE: 1
MUSCULOSKELETAL NEGATIVE: 1
CONSTITUTIONAL NEGATIVE: 1
ENDOCRINE NEGATIVE: 1
HEMATOLOGIC/LYMPHATIC NEGATIVE: 1
CARDIOVASCULAR NEGATIVE: 1
PSYCHIATRIC NEGATIVE: 1

## 2025-02-03 DIAGNOSIS — E66.09 CLASS 1 OBESITY DUE TO EXCESS CALORIES WITH SERIOUS COMORBIDITY AND BODY MASS INDEX (BMI) OF 32.0 TO 32.9 IN ADULT: ICD-10-CM

## 2025-02-03 DIAGNOSIS — E66.811 CLASS 1 OBESITY DUE TO EXCESS CALORIES WITH SERIOUS COMORBIDITY AND BODY MASS INDEX (BMI) OF 32.0 TO 32.9 IN ADULT: ICD-10-CM

## 2025-02-03 DIAGNOSIS — E11.69 DM TYPE 2 WITH DIABETIC MIXED HYPERLIPIDEMIA (CMS/HCC)  (CMS/HCC): Primary | ICD-10-CM

## 2025-02-03 DIAGNOSIS — E78.2 DM TYPE 2 WITH DIABETIC MIXED HYPERLIPIDEMIA (CMS/HCC)  (CMS/HCC): Primary | ICD-10-CM

## 2025-02-03 RX ORDER — SEMAGLUTIDE 0.68 MG/ML
0.5 INJECTION, SOLUTION SUBCUTANEOUS
Qty: 3 ML | Refills: 2 | Status: SHIPPED | OUTPATIENT
Start: 2025-02-03

## 2025-02-03 NOTE — TELEPHONE ENCOUNTER
"Medicine Refill Request    Last Office Visit: 1/23/2025   Last Consult Visit: Visit date not found  Last Telemedicine Visit: Visit date not found    Next Appointment: 7/28/2025      Current Outpatient Medications:     amoxicillin (AMOXIL) 500 mg capsule, TAKE 4 CAPSULES BY MOUTH ONE HOUR PRIOR TO DENTAL APPOINTMENT, Disp: , Rfl:     ascorbic acid (VITAMIN C) 250 mg tablet, Take 250 mg by mouth daily., Disp: , Rfl:     aspirin 81 mg enteric coated tablet, Take 81 mg by mouth daily., Disp: , Rfl:     atorvastatin (LIPITOR) 40 mg tablet, Take 1 tablet (40 mg total) by mouth daily., Disp: 90 tablet, Rfl: 3    b complex vitamins capsule, Take 1 capsule by mouth daily., Disp: , Rfl:     ergocalciferol, vitamin D2, (VITAMIN D2 ORAL), Take by mouth., Disp: , Rfl:     estradioL (ESTRACE) 0.01 % (0.1 mg/gram) vaginal cream, insert 1 gram vaginally 2 TO 3 NIGHTS PER WEEK, Disp: , Rfl:     hydrocortisone 2.5 % cream, apply to affected area twice a day, Disp: 28.35 g, Rfl: 0    linaCLOtide (LINZESS) 290 mcg capsule, Take 1 capsule (290 mcg total) by mouth daily before breakfast., Disp: 90 capsule, Rfl: 1    LORazepam (ATIVAN) 1 mg tablet, Take 1 tablet (1 mg total) by mouth 3 (three) times a day., Disp: 30 tablet, Rfl: 0    nystatin-triamcinolone (MYCOLOG II) 100,000-0.1 unit/g-% cream, Apply topically 2 (two) times a day as needed for itching or rash., Disp: 60 g, Rfl: 3    omega-3 fatty acids-fish oil 360-1,200 mg capsule, Take 1 capsule by mouth daily., Disp: , Rfl:     OZEMPIC 0.25 mg or 0.5 mg (2 mg/3 mL) subcutaneous injection, Inject 0.5 mg under the skin every (seven) 7 days., Disp: 3 mL, Rfl: 2    pen needle, diabetic (NOVOFINE PLUS) 32 gauge x 1/6\" needle, Use with ozempic weekly, Disp: 100 each, Rfl: 0    ramipriL (ALTACE) 2.5 mg capsule, Take 1 capsule (2.5 mg total) by mouth daily., Disp: 90 capsule, Rfl: 1    semaglutide (OZEMPIC) 0.25 mg or 0.5 mg (2 mg/3 mL) subcutaneous injection, Inject 0.5 mg under the skin " every (seven) 7 days., Disp: 3 mL, Rfl: 1    triamterene-hydrochlorothiazid (DYAZIDE) 37.5-25 mg per capsule, Take 1 capsule by mouth every morning., Disp: 90 capsule, Rfl: 1      BP Readings from Last 3 Encounters:   01/23/25 120/74   07/22/24 116/78   04/18/24 124/82       Recent Lab results:  Lab Results   Component Value Date    CHOL 140 08/19/2024   ,   Lab Results   Component Value Date    HDL 49 (L) 08/19/2024   ,   Lab Results   Component Value Date    LDLCALC 68 08/19/2024   ,   Lab Results   Component Value Date    TRIG 143 08/19/2024        Lab Results   Component Value Date    GLUCOSE 96 08/19/2024   ,   Lab Results   Component Value Date    HGBA1C 5.5 08/19/2024         Lab Results   Component Value Date    CREATININE 0.70 08/19/2024       Lab Results   Component Value Date    TSH 2.58 08/19/2024           Lab Results   Component Value Date    HGBA1C 5.5 08/19/2024

## 2025-02-04 NOTE — TELEPHONE ENCOUNTER
Approved    Prior authorization approved  Payer: YONNY Bull Case ID: [id number]    [phone]    [phone]  Note from payer: Your PA request has been approved.  Additional information will be provided in the approval communication. (Message 1149)  Approval Details    Authorized from 2025 to 2026  Electronic appeal: Not supported  View History     Notes     Time User Attachment    Attachment received from payer. 2025  2:54 PM Interface, Surescripts Prior Auth Resp Electronic Prior Authorization Attachment - Document    Attachment received from payer. 2025  2:54 PM Interface, Surescripts Prior Auth Resp Electronic Prior Authorization Attachment - Document    Attachment received from payer. 2025  2:54 PM Interface, Surescripts Prior Auth Resp Electronic Prior Authorization Attachment - Document      Medication Being Authorized    OZEMPIC 0.25 mg or 0.5 mg (2 mg/3 mL) subcutaneous injection  Inject 0.5 mg under the skin every (seven) 7 days.  Dispense: 3 mL Refills: 2 RENA   Start: 2/3/2025   Class: Normal Diagnoses: DM type 2 with diabetic mixed hyperlipidemia (CMS/HCC)  (CMS/HCC), Class 1 obesity due to excess calories with serious comorbidity and body mass index (BMI) of 32.0 to 32.9 in adult   This order has been released to its destination.  To be filled at: [id number] - [place] - [address] [place] [address]  Pharmacy Benefits   Open Encounter LISA FINN A  -  G1K BB CDH-N [id number] (NAE)    Covered: Retail, Specialty    Not covered: Mail Order    Unknown: Long-Term Care  Member ID: [id number] BIN: [id number] : 1955   Group ID: [id number] PCN: [id number] Legal sex: F   Group name: SERVICE [facility] [id number]   Address: [address] [place] [address]  WAYNE SALAZAR [id number]

## 2025-02-18 LAB
ALBUMIN SERPL-MCNC: 4.1 G/DL (ref 3.6–5.1)
ALBUMIN/GLOB SERPL: 1.5 (CALC) (ref 1–2.5)
ALP SERPL-CCNC: 102 U/L (ref 37–153)
ALT SERPL-CCNC: 19 U/L (ref 6–29)
AST SERPL-CCNC: 20 U/L (ref 10–35)
BASOPHILS # BLD AUTO: 62 CELLS/UL (ref 0–200)
BASOPHILS NFR BLD AUTO: 0.8 %
BILIRUB SERPL-MCNC: 0.6 MG/DL (ref 0.2–1.2)
BUN SERPL-MCNC: 18 MG/DL (ref 7–25)
BUN/CREAT SERPL: NORMAL (CALC) (ref 6–22)
CALCIUM SERPL-MCNC: 9.8 MG/DL (ref 8.6–10.4)
CHLORIDE SERPL-SCNC: 105 MMOL/L (ref 98–110)
CHOLEST SERPL-MCNC: 125 MG/DL
CHOLEST/HDLC SERPL: 2.7 (CALC)
CO2 SERPL-SCNC: 27 MMOL/L (ref 20–32)
CREAT SERPL-MCNC: 0.73 MG/DL (ref 0.6–1)
EGFRCR SERPLBLD CKD-EPI 2021: 88 ML/MIN/1.73M2
EOSINOPHIL # BLD AUTO: 200 CELLS/UL (ref 15–500)
EOSINOPHIL NFR BLD AUTO: 2.6 %
ERYTHROCYTE [DISTWIDTH] IN BLOOD BY AUTOMATED COUNT: 12.4 % (ref 11–15)
GLOBULIN SER CALC-MCNC: 2.8 G/DL (CALC) (ref 1.9–3.7)
GLUCOSE SERPL-MCNC: 97 MG/DL (ref 65–99)
HBA1C MFR BLD: 5.5 % OF TOTAL HGB
HCT VFR BLD AUTO: 44.9 % (ref 35–45)
HDLC SERPL-MCNC: 46 MG/DL
HGB BLD-MCNC: 14.8 G/DL (ref 11.7–15.5)
LDLC SERPL CALC-MCNC: 58 MG/DL (CALC)
LYMPHOCYTES # BLD AUTO: 1663 CELLS/UL (ref 850–3900)
LYMPHOCYTES NFR BLD AUTO: 21.6 %
MCH RBC QN AUTO: 29.3 PG (ref 27–33)
MCHC RBC AUTO-ENTMCNC: 33 G/DL (ref 32–36)
MCV RBC AUTO: 88.9 FL (ref 80–100)
MONOCYTES # BLD AUTO: 755 CELLS/UL (ref 200–950)
MONOCYTES NFR BLD AUTO: 9.8 %
NEUTROPHILS # BLD AUTO: 5020 CELLS/UL (ref 1500–7800)
NEUTROPHILS NFR BLD AUTO: 65.2 %
NONHDLC SERPL-MCNC: 79 MG/DL (CALC)
PLATELET # BLD AUTO: 290 THOUSAND/UL (ref 140–400)
PMV BLD REES-ECKER: 9.7 FL (ref 7.5–12.5)
POTASSIUM SERPL-SCNC: 4.9 MMOL/L (ref 3.5–5.3)
PROT SERPL-MCNC: 6.9 G/DL (ref 6.1–8.1)
RBC # BLD AUTO: 5.05 MILLION/UL (ref 3.8–5.1)
SODIUM SERPL-SCNC: 139 MMOL/L (ref 135–146)
TRIGL SERPL-MCNC: 130 MG/DL
TSH SERPL-ACNC: 1.85 MIU/L (ref 0.4–4.5)
WBC # BLD AUTO: 7.7 THOUSAND/UL (ref 3.8–10.8)

## 2025-03-03 ENCOUNTER — APPOINTMENT (OUTPATIENT)
Dept: URBAN - METROPOLITAN AREA CLINIC 203 | Age: 70
Setting detail: DERMATOLOGY
End: 2025-03-03

## 2025-03-03 DIAGNOSIS — L81.4 OTHER MELANIN HYPERPIGMENTATION: ICD-10-CM

## 2025-03-03 DIAGNOSIS — L57.8 OTHER SKIN CHANGES DUE TO CHRONIC EXPOSURE TO NONIONIZING RADIATION: ICD-10-CM

## 2025-03-03 DIAGNOSIS — L72.0 EPIDERMAL CYST: ICD-10-CM

## 2025-03-03 DIAGNOSIS — L82.0 INFLAMED SEBORRHEIC KERATOSIS: ICD-10-CM

## 2025-03-03 DIAGNOSIS — D22 MELANOCYTIC NEVI: ICD-10-CM

## 2025-03-03 DIAGNOSIS — D485 NEOPLASM OF UNCERTAIN BEHAVIOR OF SKIN: ICD-10-CM

## 2025-03-03 PROBLEM — D48.5 NEOPLASM OF UNCERTAIN BEHAVIOR OF SKIN: Status: ACTIVE | Noted: 2025-03-03

## 2025-03-03 PROBLEM — D22.5 MELANOCYTIC NEVI OF TRUNK: Status: ACTIVE | Noted: 2025-03-03

## 2025-03-03 PROCEDURE — 17110 DESTRUCT B9 LESION 1-14: CPT

## 2025-03-03 PROCEDURE — 69100 BIOPSY OF EXTERNAL EAR: CPT | Mod: RT

## 2025-03-03 PROCEDURE — OTHER COUNSELING: OTHER

## 2025-03-03 PROCEDURE — OTHER LIQUID NITROGEN: OTHER

## 2025-03-03 PROCEDURE — 99213 OFFICE O/P EST LOW 20 MIN: CPT | Mod: 25

## 2025-03-03 PROCEDURE — OTHER SUNSCREEN RECOMMENDATIONS: OTHER

## 2025-03-03 PROCEDURE — OTHER BIOPSY BY SHAVE METHOD: OTHER

## 2025-03-03 ASSESSMENT — LOCATION SIMPLE DESCRIPTION DERM
LOCATION SIMPLE: CHEST
LOCATION SIMPLE: LEFT BREAST
LOCATION SIMPLE: RIGHT CHEEK
LOCATION SIMPLE: RIGHT UPPER BACK
LOCATION SIMPLE: LEFT UPPER BACK
LOCATION SIMPLE: RIGHT EAR
LOCATION SIMPLE: LEFT SHOULDER

## 2025-03-03 ASSESSMENT — LOCATION ZONE DERM
LOCATION ZONE: ARM
LOCATION ZONE: FACE
LOCATION ZONE: TRUNK
LOCATION ZONE: EAR

## 2025-03-03 ASSESSMENT — LOCATION DETAILED DESCRIPTION DERM
LOCATION DETAILED: RIGHT SUPERIOR UPPER BACK
LOCATION DETAILED: LEFT MEDIAL BREAST 11-12:00 REGION
LOCATION DETAILED: LEFT MEDIAL SUPERIOR CHEST
LOCATION DETAILED: LEFT MEDIAL BREAST 10-11:00 REGION
LOCATION DETAILED: LEFT SUPERIOR UPPER BACK
LOCATION DETAILED: LEFT ANTERIOR SHOULDER
LOCATION DETAILED: RIGHT LATERAL BUCCAL CHEEK
LOCATION DETAILED: RIGHT CYMBA CONCHA

## 2025-03-03 NOTE — PROCEDURE: BIOPSY BY SHAVE METHOD
Detail Level: Detailed
Depth Of Biopsy: dermis
Was A Bandage Applied: Yes
Size Of Lesion In Cm: 0
Biopsy Type: H and E
Biopsy Method: Dermablade
Anesthesia Type: 1% lidocaine with epinephrine
Anesthesia Volume In Cc: 0.5
Hemostasis: Drysol
Wound Care: Petrolatum
Dressing: bandage
Destruction After The Procedure: No
Type Of Destruction Used: Curettage
Curettage Text: The wound bed was treated with curettage after the biopsy was performed.
Cryotherapy Text: The wound bed was treated with cryotherapy after the biopsy was performed.
Electrodesiccation Text: The wound bed was treated with electrodesiccation after the biopsy was performed.
Electrodesiccation And Curettage Text: The wound bed was treated with electrodesiccation and curettage after the biopsy was performed.
Silver Nitrate Text: The wound bed was treated with silver nitrate after the biopsy was performed.
Lab: 0256
Medical Necessity Information: It is in your best interest to select a reason for this procedure from the list below. All of these items fulfill various CMS LCD requirements except the new and changing color options.
Consent: Written consent was obtained and risks were reviewed including but not limited to scarring, infection, bleeding, scabbing, incomplete removal, nerve damage and allergy to anesthesia.
Post-Care Instructions: I reviewed with the patient in detail post-care instructions. Patient is to keep the biopsy site dry overnight, and then apply bacitracin twice daily until healed. Patient may apply hydrogen peroxide soaks to remove any crusting.
Notification Instructions: Patient will be notified of biopsy results. However, patient instructed to call the office if not contacted within 2 weeks.
Billing Type: Third-Party Bill
Information: Selecting Yes will display possible errors in your note based on the variables you have selected. This validation is only offered as a suggestion for you. PLEASE NOTE THAT THE VALIDATION TEXT WILL BE REMOVED WHEN YOU FINALIZE YOUR NOTE. IF YOU WANT TO FAX A PRELIMINARY NOTE YOU WILL NEED TO TOGGLE THIS TO 'NO' IF YOU DO NOT WANT IT IN YOUR FAXED NOTE.

## 2025-03-03 NOTE — PROCEDURE: LIQUID NITROGEN
Spray Paint Technique: No
Spray Paint Text: The liquid nitrogen was applied to the skin utilizing a spray paint frosting technique.
Medical Necessity Clause: This procedure was medically necessary because the lesions that were treated were:
Show Aperture Variable?: Yes
Number Of Freeze-Thaw Cycles: 3 freeze-thaw cycles
Pared With?: 15 blade
Detail Level: Detailed
Consent: The patient's consent was obtained including but not limited to risks of crusting, scabbing, blistering, scarring, darker or lighter pigmentary change, recurrence, incomplete removal and infection.
Medical Necessity Information: It is in your best interest to select a reason for this procedure from the list below. All of these items fulfill various CMS LCD requirements except the new and changing color options.
Post-Care Instructions: I reviewed with the patient in detail post-care instructions. Patient is to wear sunprotection, and avoid picking at any of the treated lesions. Pt may apply Vaseline to crusted or scabbing areas.

## 2025-03-14 RX ORDER — HYDROCORTISONE 25 MG/G
CREAM TOPICAL SEE ADMIN INSTRUCTIONS
Qty: 28.35 G | Refills: 1 | Status: SHIPPED | OUTPATIENT
Start: 2025-03-14

## 2025-03-14 NOTE — TELEPHONE ENCOUNTER
"Medicine Refill Request    Last Office Visit: 1/23/2025   Last Consult Visit: Visit date not found  Last Telemedicine Visit: Visit date not found    Next Appointment: 7/28/2025      Current Outpatient Medications:     amoxicillin (AMOXIL) 500 mg capsule, TAKE 4 CAPSULES BY MOUTH ONE HOUR PRIOR TO DENTAL APPOINTMENT, Disp: , Rfl:     ascorbic acid (VITAMIN C) 250 mg tablet, Take 250 mg by mouth daily., Disp: , Rfl:     aspirin 81 mg enteric coated tablet, Take 81 mg by mouth daily., Disp: , Rfl:     atorvastatin (LIPITOR) 40 mg tablet, Take 1 tablet (40 mg total) by mouth daily., Disp: 90 tablet, Rfl: 3    b complex vitamins capsule, Take 1 capsule by mouth daily., Disp: , Rfl:     ergocalciferol, vitamin D2, (VITAMIN D2 ORAL), Take by mouth., Disp: , Rfl:     estradioL (ESTRACE) 0.01 % (0.1 mg/gram) vaginal cream, insert 1 gram vaginally 2 TO 3 NIGHTS PER WEEK, Disp: , Rfl:     hydrocortisone 2.5 % cream, apply to affected area twice a day, Disp: 28.35 g, Rfl: 0    linaCLOtide (LINZESS) 290 mcg capsule, Take 1 capsule (290 mcg total) by mouth daily before breakfast., Disp: 90 capsule, Rfl: 1    LORazepam (ATIVAN) 1 mg tablet, Take 1 tablet (1 mg total) by mouth 3 (three) times a day., Disp: 30 tablet, Rfl: 0    nystatin-triamcinolone (MYCOLOG II) 100,000-0.1 unit/g-% cream, Apply topically 2 (two) times a day as needed for itching or rash., Disp: 60 g, Rfl: 3    omega-3 fatty acids-fish oil 360-1,200 mg capsule, Take 1 capsule by mouth daily., Disp: , Rfl:     OZEMPIC 0.25 mg or 0.5 mg (2 mg/3 mL) subcutaneous injection, Inject 0.5 mg under the skin every (seven) 7 days., Disp: 3 mL, Rfl: 2    pen needle, diabetic (NOVOFINE PLUS) 32 gauge x 1/6\" needle, Use with ozempic weekly, Disp: 100 each, Rfl: 0    ramipriL (ALTACE) 2.5 mg capsule, Take 1 capsule (2.5 mg total) by mouth daily., Disp: 90 capsule, Rfl: 1    semaglutide (OZEMPIC) 0.25 mg or 0.5 mg (2 mg/3 mL) subcutaneous injection, Inject 0.5 mg under the skin " every (seven) 7 days., Disp: 3 mL, Rfl: 1    triamterene-hydrochlorothiazid (DYAZIDE) 37.5-25 mg per capsule, Take 1 capsule by mouth every morning., Disp: 90 capsule, Rfl: 1    BP Readings from Last 3 Encounters:   01/23/25 120/74   07/22/24 116/78   04/18/24 124/82       Recent Lab results:  Lab Results   Component Value Date    CHOL 125 02/18/2025   ,   Lab Results   Component Value Date    HDL 46 (L) 02/18/2025   ,   Lab Results   Component Value Date    LDLCALC 58 02/18/2025   ,   Lab Results   Component Value Date    TRIG 130 02/18/2025        Lab Results   Component Value Date    GLUCOSE 97 02/18/2025   ,   Lab Results   Component Value Date    HGBA1C 5.5 02/18/2025         Lab Results   Component Value Date    CREATININE 0.73 02/18/2025       Lab Results   Component Value Date    TSH 1.85 02/18/2025           Lab Results   Component Value Date    HGBA1C 5.5 02/18/2025

## 2025-03-18 ENCOUNTER — APPOINTMENT (OUTPATIENT)
Dept: URBAN - METROPOLITAN AREA CLINIC 203 | Age: 70
Setting detail: DERMATOLOGY
End: 2025-03-25

## 2025-03-18 PROBLEM — C44.212 BASAL CELL CARCINOMA OF SKIN OF RIGHT EAR AND EXTERNAL AURICULAR CANAL: Status: ACTIVE | Noted: 2025-03-18

## 2025-03-18 PROCEDURE — 17282 DSTR MAL LS F/E/E/N/L/M1.1-2: CPT

## 2025-03-18 PROCEDURE — OTHER CURETTAGE AND DESTRUCTION: OTHER

## 2025-04-08 DIAGNOSIS — I10 ESSENTIAL HYPERTENSION: ICD-10-CM

## 2025-04-08 RX ORDER — RAMIPRIL 2.5 MG/1
2.5 CAPSULE ORAL DAILY
Qty: 90 CAPSULE | Refills: 3 | Status: SHIPPED | OUTPATIENT
Start: 2025-04-08

## 2025-04-08 NOTE — TELEPHONE ENCOUNTER
"Medicine Refill Request    Last Office Visit: 1/23/2025   Last Consult Visit: Visit date not found  Last Telemedicine Visit: Visit date not found    Next Appointment: 7/28/2025      Current Outpatient Medications:     amoxicillin (AMOXIL) 500 mg capsule, TAKE 4 CAPSULES BY MOUTH ONE HOUR PRIOR TO DENTAL APPOINTMENT, Disp: , Rfl:     ascorbic acid (VITAMIN C) 250 mg tablet, Take 250 mg by mouth daily., Disp: , Rfl:     aspirin 81 mg enteric coated tablet, Take 81 mg by mouth daily., Disp: , Rfl:     atorvastatin (LIPITOR) 40 mg tablet, Take 1 tablet (40 mg total) by mouth daily., Disp: 90 tablet, Rfl: 3    b complex vitamins capsule, Take 1 capsule by mouth daily., Disp: , Rfl:     ergocalciferol, vitamin D2, (VITAMIN D2 ORAL), Take by mouth., Disp: , Rfl:     estradioL (ESTRACE) 0.01 % (0.1 mg/gram) vaginal cream, insert 1 gram vaginally 2 TO 3 NIGHTS PER WEEK, Disp: , Rfl:     hydrocortisone 2.5 % cream, Apply topically See admin instr. APPLY TO AFFECTED AREA TWICE A DAY, Disp: 28.35 g, Rfl: 1    linaCLOtide (LINZESS) 290 mcg capsule, Take 1 capsule (290 mcg total) by mouth daily before breakfast., Disp: 90 capsule, Rfl: 1    LORazepam (ATIVAN) 1 mg tablet, Take 1 tablet (1 mg total) by mouth 3 (three) times a day., Disp: 30 tablet, Rfl: 0    nystatin-triamcinolone (MYCOLOG II) 100,000-0.1 unit/g-% cream, Apply topically 2 (two) times a day as needed for itching or rash., Disp: 60 g, Rfl: 3    omega-3 fatty acids-fish oil 360-1,200 mg capsule, Take 1 capsule by mouth daily., Disp: , Rfl:     OZEMPIC 0.25 mg or 0.5 mg (2 mg/3 mL) subcutaneous injection, Inject 0.5 mg under the skin every (seven) 7 days., Disp: 3 mL, Rfl: 2    pen needle, diabetic (NOVOFINE PLUS) 32 gauge x 1/6\" needle, Use with ozempic weekly, Disp: 100 each, Rfl: 0    ramipriL (ALTACE) 2.5 mg capsule, Take 1 capsule (2.5 mg total) by mouth daily., Disp: 90 capsule, Rfl: 1    semaglutide (OZEMPIC) 0.25 mg or 0.5 mg (2 mg/3 mL) subcutaneous " injection, Inject 0.5 mg under the skin every (seven) 7 days., Disp: 3 mL, Rfl: 1    triamterene-hydrochlorothiazid (DYAZIDE) 37.5-25 mg per capsule, Take 1 capsule by mouth every morning., Disp: 90 capsule, Rfl: 1    BP Readings from Last 3 Encounters:   01/23/25 120/74   07/22/24 116/78   04/18/24 124/82       Recent Lab results:  Lab Results   Component Value Date    CHOL 125 02/18/2025   ,   Lab Results   Component Value Date    HDL 46 (L) 02/18/2025   ,   Lab Results   Component Value Date    LDLCALC 58 02/18/2025   ,   Lab Results   Component Value Date    TRIG 130 02/18/2025        Lab Results   Component Value Date    GLUCOSE 97 02/18/2025   ,   Lab Results   Component Value Date    HGBA1C 5.5 02/18/2025         Lab Results   Component Value Date    CREATININE 0.73 02/18/2025       Lab Results   Component Value Date    TSH 1.85 02/18/2025           Lab Results   Component Value Date    HGBA1C 5.5 02/18/2025

## 2025-05-19 PROBLEM — Z01.810 PREOPERATIVE CARDIOVASCULAR EXAMINATION: Status: ACTIVE | Noted: 2025-05-19

## 2025-05-20 ENCOUNTER — OFFICE VISIT (OUTPATIENT)
Dept: CARDIOLOGY | Facility: CLINIC | Age: 70
End: 2025-05-20
Payer: COMMERCIAL

## 2025-05-20 VITALS
HEIGHT: 60 IN | BODY MASS INDEX: 31.41 KG/M2 | WEIGHT: 160 LBS | RESPIRATION RATE: 18 BRPM | DIASTOLIC BLOOD PRESSURE: 72 MMHG | SYSTOLIC BLOOD PRESSURE: 114 MMHG | HEART RATE: 78 BPM | OXYGEN SATURATION: 98 %

## 2025-05-20 DIAGNOSIS — Z01.810 PREOPERATIVE CARDIOVASCULAR EXAMINATION: ICD-10-CM

## 2025-05-20 DIAGNOSIS — I25.10 CORONARY ARTERY DISEASE INVOLVING NATIVE CORONARY ARTERY OF NATIVE HEART WITHOUT ANGINA PECTORIS: Primary | ICD-10-CM

## 2025-05-20 DIAGNOSIS — E78.2 MIXED HYPERLIPIDEMIA: ICD-10-CM

## 2025-05-20 DIAGNOSIS — I10 ESSENTIAL HYPERTENSION: ICD-10-CM

## 2025-05-20 LAB
QRS DURATION: 4
QT INTERVAL: 142
QTC CALCULATION(BAZETT): 232
R AXIS: 0
T WAVE AXIS: 242
VENTRICULAR RATE: 161

## 2025-05-20 PROCEDURE — 99214 OFFICE O/P EST MOD 30 MIN: CPT | Performed by: INTERNAL MEDICINE

## 2025-05-20 PROCEDURE — 93000 ELECTROCARDIOGRAM COMPLETE: CPT | Performed by: INTERNAL MEDICINE

## 2025-05-20 PROCEDURE — 3008F BODY MASS INDEX DOCD: CPT | Performed by: INTERNAL MEDICINE

## 2025-06-05 DIAGNOSIS — E11.69 DM TYPE 2 WITH DIABETIC MIXED HYPERLIPIDEMIA (CMS/HCC)  (CMS/HCC): ICD-10-CM

## 2025-06-05 DIAGNOSIS — E78.2 DM TYPE 2 WITH DIABETIC MIXED HYPERLIPIDEMIA (CMS/HCC)  (CMS/HCC): ICD-10-CM

## 2025-06-05 DIAGNOSIS — E66.811 CLASS 1 OBESITY DUE TO EXCESS CALORIES WITH SERIOUS COMORBIDITY AND BODY MASS INDEX (BMI) OF 32.0 TO 32.9 IN ADULT: ICD-10-CM

## 2025-06-05 DIAGNOSIS — E66.09 CLASS 1 OBESITY DUE TO EXCESS CALORIES WITH SERIOUS COMORBIDITY AND BODY MASS INDEX (BMI) OF 32.0 TO 32.9 IN ADULT: ICD-10-CM

## 2025-06-05 RX ORDER — SEMAGLUTIDE 0.68 MG/ML
0.5 INJECTION, SOLUTION SUBCUTANEOUS
Qty: 3 ML | Refills: 2 | Status: SHIPPED | OUTPATIENT
Start: 2025-06-05

## 2025-06-05 NOTE — TELEPHONE ENCOUNTER
"Medicine Refill Request    Last Office Visit: 1/23/2025   Last Consult Visit: Visit date not found  Last Telemedicine Visit: Visit date not found    Next Appointment: 7/28/2025      Current Outpatient Medications:     amoxicillin (AMOXIL) 500 mg capsule, TAKE 4 CAPSULES BY MOUTH ONE HOUR PRIOR TO DENTAL APPOINTMENT, Disp: , Rfl:     ascorbic acid (VITAMIN C) 250 mg tablet, Take 250 mg by mouth daily., Disp: , Rfl:     aspirin 81 mg enteric coated tablet, Take 81 mg by mouth daily., Disp: , Rfl:     atorvastatin (LIPITOR) 40 mg tablet, Take 1 tablet (40 mg total) by mouth daily., Disp: 90 tablet, Rfl: 3    b complex vitamins capsule, Take 1 capsule by mouth daily., Disp: , Rfl:     ergocalciferol, vitamin D2, (VITAMIN D2 ORAL), Take by mouth., Disp: , Rfl:     estradioL (ESTRACE) 0.01 % (0.1 mg/gram) vaginal cream, insert 1 gram vaginally 2 TO 3 NIGHTS PER WEEK, Disp: , Rfl:     hydrocortisone 2.5 % cream, Apply topically See admin instr. APPLY TO AFFECTED AREA TWICE A DAY, Disp: 28.35 g, Rfl: 1    linaCLOtide (LINZESS) 290 mcg capsule, Take 1 capsule (290 mcg total) by mouth daily before breakfast., Disp: 90 capsule, Rfl: 1    LORazepam (ATIVAN) 1 mg tablet, Take 1 tablet (1 mg total) by mouth 3 (three) times a day., Disp: 30 tablet, Rfl: 0    nystatin-triamcinolone (MYCOLOG II) 100,000-0.1 unit/g-% cream, Apply topically 2 (two) times a day as needed for itching or rash., Disp: 60 g, Rfl: 3    omega-3 fatty acids-fish oil 360-1,200 mg capsule, Take 1 capsule by mouth daily., Disp: , Rfl:     OZEMPIC 0.25 mg or 0.5 mg (2 mg/3 mL) subcutaneous injection, Inject 0.5 mg under the skin every (seven) 7 days., Disp: 3 mL, Rfl: 2    pen needle, diabetic (NOVOFINE PLUS) 32 gauge x 1/6\" needle, Use with ozempic weekly, Disp: 100 each, Rfl: 0    ramipriL (ALTACE) 2.5 mg capsule, Take 1 capsule (2.5 mg total) by mouth daily., Disp: 90 capsule, Rfl: 3    semaglutide (OZEMPIC) 0.25 mg or 0.5 mg (2 mg/3 mL) subcutaneous " injection, Inject 0.5 mg under the skin every (seven) 7 days. (Patient not taking: Reported on 5/20/2025), Disp: 3 mL, Rfl: 1    triamterene-hydrochlorothiazid (DYAZIDE) 37.5-25 mg per capsule, Take 1 capsule by mouth every morning., Disp: 90 capsule, Rfl: 1    BP Readings from Last 3 Encounters:   05/20/25 114/72   01/23/25 120/74   07/22/24 116/78       Recent Lab results:  Lab Results   Component Value Date    CHOL 125 02/18/2025   ,   Lab Results   Component Value Date    HDL 46 (L) 02/18/2025   ,   Lab Results   Component Value Date    LDLCALC 58 02/18/2025   ,   Lab Results   Component Value Date    TRIG 130 02/18/2025        Lab Results   Component Value Date    GLUCOSE 97 02/18/2025   ,   Lab Results   Component Value Date    HGBA1C 5.5 02/18/2025         Lab Results   Component Value Date    CREATININE 0.73 02/18/2025       Lab Results   Component Value Date    TSH 1.85 02/18/2025           Lab Results   Component Value Date    HGBA1C 5.5 02/18/2025

## 2025-07-28 ENCOUNTER — OFFICE VISIT (OUTPATIENT)
Dept: FAMILY MEDICINE | Facility: CLINIC | Age: 70
End: 2025-07-28
Payer: COMMERCIAL

## 2025-07-28 VITALS
BODY MASS INDEX: 30.78 KG/M2 | OXYGEN SATURATION: 76 % | RESPIRATION RATE: 16 BRPM | WEIGHT: 163 LBS | TEMPERATURE: 97.2 F | DIASTOLIC BLOOD PRESSURE: 64 MMHG | HEIGHT: 61 IN | HEART RATE: 82 BPM | SYSTOLIC BLOOD PRESSURE: 110 MMHG

## 2025-07-28 DIAGNOSIS — I25.10 CORONARY ARTERY DISEASE INVOLVING NATIVE CORONARY ARTERY OF NATIVE HEART WITHOUT ANGINA PECTORIS: ICD-10-CM

## 2025-07-28 DIAGNOSIS — E11.69 DM TYPE 2 WITH DIABETIC MIXED HYPERLIPIDEMIA (CMS/HCC)  (CMS/HCC): Primary | ICD-10-CM

## 2025-07-28 DIAGNOSIS — E78.2 MIXED HYPERLIPIDEMIA: ICD-10-CM

## 2025-07-28 DIAGNOSIS — I10 ESSENTIAL HYPERTENSION: ICD-10-CM

## 2025-07-28 DIAGNOSIS — E78.2 DM TYPE 2 WITH DIABETIC MIXED HYPERLIPIDEMIA (CMS/HCC)  (CMS/HCC): Primary | ICD-10-CM

## 2025-07-28 DIAGNOSIS — E55.9 VITAMIN D DEFICIENCY DISEASE: ICD-10-CM

## 2025-07-28 PROBLEM — Z01.810 PREOPERATIVE CARDIOVASCULAR EXAMINATION: Status: RESOLVED | Noted: 2025-05-19 | Resolved: 2025-07-28

## 2025-07-28 PROCEDURE — 99214 OFFICE O/P EST MOD 30 MIN: CPT | Performed by: INTERNAL MEDICINE

## 2025-07-28 PROCEDURE — 3008F BODY MASS INDEX DOCD: CPT | Performed by: INTERNAL MEDICINE

## 2025-07-28 ASSESSMENT — ENCOUNTER SYMPTOMS
CONSTITUTIONAL NEGATIVE: 1
NEUROLOGICAL NEGATIVE: 1
ENDOCRINE NEGATIVE: 1
ALLERGIC/IMMUNOLOGIC NEGATIVE: 1
CARDIOVASCULAR NEGATIVE: 1
PSYCHIATRIC NEGATIVE: 1
MUSCULOSKELETAL NEGATIVE: 1
GASTROINTESTINAL NEGATIVE: 1
RESPIRATORY NEGATIVE: 1
EYES NEGATIVE: 1
HEMATOLOGIC/LYMPHATIC NEGATIVE: 1

## 2025-07-28 ASSESSMENT — PATIENT HEALTH QUESTIONNAIRE - PHQ9: SUM OF ALL RESPONSES TO PHQ9 QUESTIONS 1 & 2: 0

## 2025-07-28 ASSESSMENT — PAIN SCALES - GENERAL: PAINLEVEL_OUTOF10: 0-NO PAIN

## 2025-07-28 NOTE — PROGRESS NOTES
Isabel Spivey is a 70 y.o. female     69 y/o female presents for follow up  Scheduled - left total replacement - 10/7    HTN - no headaches, dizziness, lightheadedness, CP  Checking at home   Tolerating meds without side effects     Wt Readings from Last 3 Encounters:  07/28/25 : 73.9 kg (163 lb)  05/20/25 : 72.6 kg (160 lb)  01/23/25 : 73.1 kg (161 lb 2 oz)    Lab Results       Component                Value               Date                       HGBA1C                   5.5                 02/18/2025            Well controlled   Eye exam April              Past Medical History:   Diagnosis Date    Arthritis     Hypertension     Lipid disorder     Type 2 diabetes mellitus (CMS/HCC)        Past Surgical History   Procedure Laterality Date    Carpal tunnel release Bilateral     Colon surgery      Hysterectomy      total    Joint replacement Right     knee    Oophorectomy Right     Trigger finger release Right     middle finger        Social History     Socioeconomic History    Marital status:      Spouse name: None    Number of children: None    Years of education: None    Highest education level: None   Tobacco Use    Smoking status: Former     Current packs/day: 0.00     Types: Cigarettes     Quit date: 2010     Years since quitting: 15.5    Smokeless tobacco: Never   Vaping Use    Vaping status: Never Used   Substance and Sexual Activity    Alcohol use: Yes     Comment: ocassionally    Drug use: No    Sexual activity: Yes     Partners: Male     Social Drivers of Health     Food Insecurity: No Food Insecurity (1/16/2025)    Hunger Vital Sign     Worried About Running Out of Food in the Last Year: Never true     Ran Out of Food in the Last Year: Never true   Transportation Needs: No Transportation Needs (1/16/2025)    PRAPARE - Transportation     Lack of Transportation (Medical): No     Lack of Transportation (Non-Medical): No   Housing Stability: Unknown (1/16/2025)    Housing Stability Vital Sign      "Unable to Pay for Housing in the Last Year: No     Homeless in the Last Year: No       No family history on file.    No known drug allergies    Current Outpatient Medications   Medication Sig Dispense Refill    amoxicillin (AMOXIL) 500 mg capsule TAKE 4 CAPSULES BY MOUTH ONE HOUR PRIOR TO DENTAL APPOINTMENT      ascorbic acid (VITAMIN C) 250 mg tablet Take 250 mg by mouth daily.      aspirin 81 mg enteric coated tablet Take 81 mg by mouth daily.      atorvastatin (LIPITOR) 40 mg tablet Take 1 tablet (40 mg total) by mouth daily. 90 tablet 3    b complex vitamins capsule Take 1 capsule by mouth daily.      ergocalciferol, vitamin D2, (VITAMIN D2 ORAL) Take by mouth.      estradioL (ESTRACE) 0.01 % (0.1 mg/gram) vaginal cream insert 1 gram vaginally 2 TO 3 NIGHTS PER WEEK      hydrocortisone 2.5 % cream Apply topically See admin instr. APPLY TO AFFECTED AREA TWICE A DAY 28.35 g 1    linaCLOtide (LINZESS) 290 mcg capsule Take 1 capsule (290 mcg total) by mouth daily before breakfast. 90 capsule 1    LORazepam (ATIVAN) 1 mg tablet Take 1 tablet (1 mg total) by mouth 3 (three) times a day. 30 tablet 0    nystatin-triamcinolone (MYCOLOG II) 100,000-0.1 unit/g-% cream Apply topically 2 (two) times a day as needed for itching or rash. 60 g 3    omega-3 fatty acids-fish oil 360-1,200 mg capsule Take 1 capsule by mouth daily.      OZEMPIC 0.25 mg or 0.5 mg (2 mg/3 mL) subcutaneous injection Inject 0.5 mg under the skin every (seven) 7 days. 3 mL 2    pen needle, diabetic (NOVOFINE PLUS) 32 gauge x 1/6\" needle Use with ozempic weekly 100 each 0    ramipriL (ALTACE) 2.5 mg capsule Take 1 capsule (2.5 mg total) by mouth daily. 90 capsule 3    triamterene-hydrochlorothiazid (DYAZIDE) 37.5-25 mg per capsule Take 1 capsule by mouth every morning. 90 capsule 1    semaglutide (OZEMPIC) 0.25 mg or 0.5 mg (2 mg/3 mL) subcutaneous injection Inject 0.5 mg under the skin every (seven) 7 days. (Patient not taking: Reported on 7/28/2025) 3 " mL 1     No current facility-administered medications for this visit.       Review of Systems   Constitutional: Negative.    HENT: Negative.     Eyes: Negative.    Respiratory: Negative.     Cardiovascular: Negative.    Gastrointestinal: Negative.    Endocrine: Negative.    Genitourinary: Negative.    Musculoskeletal: Negative.    Skin: Negative.    Allergic/Immunologic: Negative.    Neurological: Negative.    Hematological: Negative.    Psychiatric/Behavioral: Negative.            Vitals:    07/28/25 0919   BP: 110/64   Pulse: 82   Resp: 16   Temp: 36.2 °C (97.2 °F)   SpO2: (!) 76%       Body mass index is 31.31 kg/m².      Physical Exam  Constitutional:       Appearance: Normal appearance.   HENT:      Right Ear: Tympanic membrane, ear canal and external ear normal.      Left Ear: Tympanic membrane, ear canal and external ear normal.   Eyes:      Extraocular Movements: Extraocular movements intact.      Conjunctiva/sclera: Conjunctivae normal.      Pupils: Pupils are equal, round, and reactive to light.      Comments: anicteric no pallor         Neck:      Vascular: No carotid bruit.   Cardiovascular:      Rate and Rhythm: Normal rate and regular rhythm.      Pulses: Normal pulses.      Heart sounds: Normal heart sounds. No murmur heard.  Pulmonary:      Effort: Pulmonary effort is normal.      Breath sounds: Normal breath sounds. No wheezing or rhonchi.   Abdominal:      General: Abdomen is flat. Bowel sounds are normal.      Palpations: Abdomen is soft. There is no mass.      Tenderness: There is no abdominal tenderness.   Musculoskeletal:      Right lower leg: No edema.      Left lower leg: No edema.   Neurological:      General: No focal deficit present.      Mental Status: She is alert and oriented to person, place, and time.   Psychiatric:         Mood and Affect: Mood normal.         Behavior: Behavior normal.          Assessment/Plan      Diagnoses and all orders for this visit:    DM type 2 with diabetic  mixed hyperlipidemia (CMS/HCC)  (CMS/HCC) (Primary)  -     Hemoglobin A1c; Future  -     Comprehensive metabolic panel; Future  -     CBC and differential; Future  -     Lipid panel; Future  -     Microalbumin / creatinine urine ratio; Future  Follow up labs   Well controlled   Essential hypertension  At goal   Mixed hyperlipidemia  -     TSH w reflex FT4; Future    Coronary artery disease involving native coronary artery of native heart without angina pectoris  -     Magnesium; Future    Vitamin D deficiency disease  -     Vitamin D 25 hydroxy; Future          No orders of the defined types were placed in this encounter.      There are no discontinued medications.     No orders of the defined types were placed in this encounter.       John Bowens,   7/28/2025

## 2025-08-08 ENCOUNTER — NURSE TRIAGE (OUTPATIENT)
Dept: FAMILY MEDICINE | Facility: CLINIC | Age: 70
End: 2025-08-08
Payer: COMMERCIAL

## 2025-08-08 ENCOUNTER — HOSPITAL ENCOUNTER (OUTPATIENT)
Facility: CLINIC | Age: 70
Discharge: HOME | End: 2025-08-08
Attending: FAMILY MEDICINE
Payer: COMMERCIAL

## 2025-08-08 VITALS
DIASTOLIC BLOOD PRESSURE: 84 MMHG | HEART RATE: 92 BPM | SYSTOLIC BLOOD PRESSURE: 128 MMHG | TEMPERATURE: 98 F | OXYGEN SATURATION: 98 %

## 2025-08-08 DIAGNOSIS — G57.01 PIRIFORMIS SYNDROME, RIGHT: Primary | ICD-10-CM

## 2025-08-08 PROCEDURE — 99213 OFFICE O/P EST LOW 20 MIN: CPT | Performed by: FAMILY MEDICINE

## 2025-08-08 PROCEDURE — S9083 URGENT CARE CENTER GLOBAL: HCPCS | Performed by: FAMILY MEDICINE

## 2025-08-08 RX ORDER — CYCLOBENZAPRINE HCL 5 MG
5 TABLET ORAL 3 TIMES DAILY PRN
Qty: 30 TABLET | Refills: 0 | Status: SHIPPED | OUTPATIENT
Start: 2025-08-08

## 2025-08-08 ASSESSMENT — ENCOUNTER SYMPTOMS
DIARRHEA: 0
BACK PAIN: 1
DIAPHORESIS: 0
FEVER: 0
DIFFICULTY URINATING: 0
CHILLS: 0
NUMBNESS: 0
CONSTIPATION: 0

## 2025-08-08 NOTE — ED PROVIDER NOTES
History  Chief Complaint   Patient presents with    Back Pain     Lower right side , states the pain is going down to her ankles , got up from the toilet   late yesterday      Started yest afternoon getting up from toilet got sudden shooting pain R low back down R leg to ankle. Worse when she lays down. Little better when sitting/walking but still painful. Decreased with seated R hip flexion (hip to chest). Worse with any extension. Tried aleve, tylenol, heat/ice - no help. No saddle anesthesia, no B/B issues, no numbness/tingling. Hx of similar thing on L side many years ago from picking something up.       History provided by:  Patient  Back Pain  Associated symptoms: no fever and no numbness        Past Medical History:   Diagnosis Date    Arthritis     Hypertension     Lipid disorder     Type 2 diabetes mellitus (CMS/HCC)        Past Surgical History   Procedure Laterality Date    Carpal tunnel release Bilateral     Colon surgery      Hysterectomy      total    Joint replacement Right     knee    Oophorectomy Right     Trigger finger release Right     middle finger       No family history on file.    Social History     Tobacco Use    Smoking status: Former     Current packs/day: 0.00     Types: Cigarettes     Quit date: 2010     Years since quitting: 15.6    Smokeless tobacco: Never   Vaping Use    Vaping status: Never Used   Substance Use Topics    Alcohol use: Yes     Comment: ocassionally    Drug use: No       Review of Systems   Constitutional:  Negative for chills, diaphoresis and fever.   Gastrointestinal:  Negative for constipation and diarrhea.   Genitourinary:  Negative for difficulty urinating.   Musculoskeletal:  Positive for back pain and gait problem (due to pain).   Neurological:  Negative for numbness.       Physical Exam  ED Triage Vitals [08/08/25 0914]   Temp Heart Rate Resp BP SpO2   36.7 °C (98 °F) 92 -- 128/84 98 %      Temp src Heart Rate Source Patient Position BP Location FiO2 (%) (Set)    -- -- -- -- --       Physical Exam  Vitals and nursing note reviewed.   Constitutional:       Appearance: Normal appearance.   Musculoskeletal:      Cervical back: Neck supple.      Lumbar back: Spasms (L paraspinal but nontender) and tenderness (most over R piriformis with reproduction of sxs) present. No bony tenderness. Decreased range of motion (extension. Pain R with side bending to L). Negative right straight leg raise test and negative left straight leg raise test.      Comments: Seated SLR neg b/l   Neurological:      Mental Status: She is alert.      Sensory: Sensation is intact.      Motor: Motor function is intact. No weakness.      Gait: Gait is intact.      Deep Tendon Reflexes:      Reflex Scores:       Achilles reflexes are 1+ on the right side and 1+ on the left side.          Procedures  Procedures    UC Course       Medical Decision Making  Piriformis syn R  No signs of disc/spinal issue  Muscle relaxant - Side effects of med discussed  OTC for pain control, heat  Follow for resolution  If worsening/not improving to be re-evaluated  All questions answered. Pt agreeable with plan.                       Toeny Bridges MD  08/08/25 2220

## 2025-08-08 NOTE — TELEPHONE ENCOUNTER
Regarding: RED FLAG PAIN  ----- Message from Tammy FOLEY sent at 8/8/2025  8:30 AM EDT -----  Patient called today with red flag complaint of pain scale of 10, right sided lower back radiating down leg .    INSURANCE/RTE Concern? no    Has the caller been verified as an authorized person to receive PHI? yes  Method of Confirmation? SELF     Call transferred to Primary Care Nurse Triage Line.

## 2025-08-08 NOTE — TELEPHONE ENCOUNTER
"Patient transferred to the Primary Care Telephonic Nurse Triage Line with complaints of back pain    HPI:  Patient called with acute onset of right sided lower back pain.  Sat up from the toilet yesterday and started with lower back pain.  Pain is on the right lower side radiating down the right leg.  Has weakness in the leg from the pain.  Last night she tried a topical analgesic patch, Tylenol and ice without any relief.  Patient is using a cane to ambulate due to the leg pain.  Has history of arthritis and back pain.  No recent trauma to back.  Unable to access an appt with PCP today.  Advised to go to .    Disposition:  Go to Urgent Care Now (overriding See Today in Office)    Care Advice:  Care Advice Given    Patient/Caregiver understands and will follow care advice?: Yes, plans to follow advice      Given By Given At Modified    Aurelia Bracht 8/8/2025  8:44 AM Yes           SEE IN OFFICE OR VIDEO VISIT TODAY:  Unable to access an appt with PCP or subgroup. Patient will go to [facility] urgent care [place].               Initial Assessment:  1. ONSET: \"When did the pain begin?\" (e.g., minutes, hours, days)      Yesterday afternoon  2. LOCATION: \"Where does it hurt?\" (upper, mid or lower back)      Right lower back pain  3. SEVERITY: \"How bad is the pain?\"  (e.g., Scale 1-10; mild, moderate, or severe)      10/10  4. PATTERN: \"Is the pain constant?\" (e.g., yes, no; constant, intermittent)       constant  5. RADIATION: \"Does the pain shoot into your legs or somewhere else?\"      Radiates down the leg  6. CAUSE:  \"What do you think is causing the back pain?\"       Muscle strain has chronic arthritis  7. BACK OVERUSE:  \"Any recent lifting of heavy objects, strenuous work or exercise?\"      Sat up from the toilet and felt her lower back start to bother her pain has been severe since then  8. MEDICINES: \"What have you taken so far for the pain?\" (e.g., nothing, acetaminophen, NSAIDS)      Topical pain patches, Tylenol and " "ice without relief  9. NEUROLOGIC SYMPTOMS: \"Do you have any weakness, numbness, or problems with bowel/bladder control?\"      Denies  10. OTHER SYMPTOMS: \"Do you have any other symptoms?\" (e.g., fever, abdomen pain, burning with urination, blood in urine)        Denies any other sx      "

## 2025-08-08 NOTE — DISCHARGE INSTRUCTIONS
You can take naproxen (generic aleve) to help with your symptoms. 1 tab every 12 hours with food. You should not take any other product with ibuprofen, motrin, or advil while you are taking the naproxen.    You can also take acetaminophen (generic tylenol) to help with your symptoms. If you are not taking any multi-symptom medication with acetaminophen, you can take 2 extra strength tabs every 8 hours.    Muscle relaxants have the potential to make you sleepy. The first time you take it you should be in a position to lay down if it makes you sleepy. You should not take the muscle relaxant before driving.    Apply moist heat to the area 3-4 times per day to help with your symptoms.    If you develop any new concerning symptoms, like loss of control of your bowels or urine, severe pain, numbness, tingling, or electric shock sensations, weakness in one leg, etc, then you should call 911 or go to the emergency department for further evaluation.    Return in 1-2 weeks here or with your primary doctor if your symptoms are not improving.

## 2025-08-12 ENCOUNTER — OFFICE VISIT (OUTPATIENT)
Dept: FAMILY MEDICINE | Facility: CLINIC | Age: 70
End: 2025-08-12
Payer: COMMERCIAL

## 2025-08-12 VITALS
DIASTOLIC BLOOD PRESSURE: 68 MMHG | RESPIRATION RATE: 16 BRPM | OXYGEN SATURATION: 93 % | BODY MASS INDEX: 30.78 KG/M2 | HEIGHT: 61 IN | TEMPERATURE: 97.7 F | SYSTOLIC BLOOD PRESSURE: 120 MMHG | WEIGHT: 163 LBS | HEART RATE: 94 BPM

## 2025-08-12 DIAGNOSIS — E66.811 CLASS 1 OBESITY DUE TO EXCESS CALORIES WITH SERIOUS COMORBIDITY AND BODY MASS INDEX (BMI) OF 32.0 TO 32.9 IN ADULT: ICD-10-CM

## 2025-08-12 DIAGNOSIS — E11.69 DM TYPE 2 WITH DIABETIC MIXED HYPERLIPIDEMIA (CMS/HCC)  (CMS/HCC): ICD-10-CM

## 2025-08-12 DIAGNOSIS — M54.50 LUMBAR BACK PAIN: Primary | ICD-10-CM

## 2025-08-12 DIAGNOSIS — E78.2 DM TYPE 2 WITH DIABETIC MIXED HYPERLIPIDEMIA (CMS/HCC)  (CMS/HCC): ICD-10-CM

## 2025-08-12 DIAGNOSIS — E78.2 MIXED HYPERLIPIDEMIA: ICD-10-CM

## 2025-08-12 DIAGNOSIS — E66.09 CLASS 1 OBESITY DUE TO EXCESS CALORIES WITH SERIOUS COMORBIDITY AND BODY MASS INDEX (BMI) OF 32.0 TO 32.9 IN ADULT: ICD-10-CM

## 2025-08-12 DIAGNOSIS — M54.16 LUMBAR RADICULOPATHY: ICD-10-CM

## 2025-08-12 PROCEDURE — 99214 OFFICE O/P EST MOD 30 MIN: CPT | Performed by: INTERNAL MEDICINE

## 2025-08-12 PROCEDURE — 3008F BODY MASS INDEX DOCD: CPT | Performed by: INTERNAL MEDICINE

## 2025-08-12 RX ORDER — LIDOCAINE 50 MG/G
1 PATCH TOPICAL DAILY
Qty: 30 PATCH | Refills: 1 | Status: SHIPPED | OUTPATIENT
Start: 2025-08-12 | End: 2025-09-11

## 2025-08-12 RX ORDER — SEMAGLUTIDE 0.68 MG/ML
0.5 INJECTION, SOLUTION SUBCUTANEOUS
Qty: 3 ML | Refills: 2 | Status: SHIPPED | OUTPATIENT
Start: 2025-08-12

## 2025-08-12 RX ORDER — ATORVASTATIN CALCIUM 40 MG/1
40 TABLET, FILM COATED ORAL DAILY
Qty: 90 TABLET | Refills: 3 | Status: SHIPPED | OUTPATIENT
Start: 2025-08-12

## 2025-08-12 RX ORDER — HYDROCORTISONE 25 MG/G
CREAM TOPICAL SEE ADMIN INSTRUCTIONS
Qty: 28.35 G | Refills: 1 | Status: SHIPPED | OUTPATIENT
Start: 2025-08-12

## 2025-08-12 ASSESSMENT — ENCOUNTER SYMPTOMS
GASTROINTESTINAL NEGATIVE: 1
EYES NEGATIVE: 1
HEMATOLOGIC/LYMPHATIC NEGATIVE: 1
MUSCULOSKELETAL NEGATIVE: 1
ENDOCRINE NEGATIVE: 1
CONSTITUTIONAL NEGATIVE: 1
NEUROLOGICAL NEGATIVE: 1
PSYCHIATRIC NEGATIVE: 1
CARDIOVASCULAR NEGATIVE: 1
RESPIRATORY NEGATIVE: 1
ALLERGIC/IMMUNOLOGIC NEGATIVE: 1

## 2025-08-12 ASSESSMENT — PATIENT HEALTH QUESTIONNAIRE - PHQ9: SUM OF ALL RESPONSES TO PHQ9 QUESTIONS 1 & 2: 0

## 2025-08-12 ASSESSMENT — PAIN SCALES - GENERAL: PAINLEVEL_OUTOF10: 4

## 2025-08-14 ENCOUNTER — HOSPITAL ENCOUNTER (OUTPATIENT)
Dept: RADIOLOGY | Facility: CLINIC | Age: 70
Discharge: HOME | End: 2025-08-14
Attending: INTERNAL MEDICINE
Payer: COMMERCIAL

## 2025-08-14 DIAGNOSIS — M54.16 LUMBAR RADICULOPATHY: ICD-10-CM

## 2025-08-14 PROCEDURE — 72220 X-RAY EXAM SACRUM TAILBONE: CPT

## 2025-08-14 PROCEDURE — 72110 X-RAY EXAM L-2 SPINE 4/>VWS: CPT

## 2025-08-22 LAB
25(OH)D3+25(OH)D2 SERPL-MCNC: 71 NG/ML (ref 30–100)
ALBUMIN SERPL-MCNC: 3.7 G/DL (ref 3.6–5.1)
ALBUMIN/GLOB SERPL: 1.6 (CALC) (ref 1–2.5)
ALP SERPL-CCNC: 94 U/L (ref 37–153)
ALT SERPL-CCNC: 25 U/L (ref 6–29)
AST SERPL-CCNC: 18 U/L (ref 10–35)
BASOPHILS # BLD AUTO: 78 CELLS/UL (ref 0–200)
BASOPHILS NFR BLD AUTO: 0.9 %
BILIRUB SERPL-MCNC: 0.7 MG/DL (ref 0.2–1.2)
BUN SERPL-MCNC: 22 MG/DL (ref 7–25)
BUN/CREAT SERPL: ABNORMAL (CALC) (ref 6–22)
CALCIUM SERPL-MCNC: 9 MG/DL (ref 8.6–10.4)
CHLORIDE SERPL-SCNC: 102 MMOL/L (ref 98–110)
CHOLEST SERPL-MCNC: 135 MG/DL
CHOLEST/HDLC SERPL: 2.2 (CALC)
CO2 SERPL-SCNC: 28 MMOL/L (ref 20–32)
CREAT SERPL-MCNC: 0.78 MG/DL (ref 0.6–1)
EGFRCR SERPLBLD CKD-EPI 2021: 81 ML/MIN/1.73M2
EOSINOPHIL # BLD AUTO: 305 CELLS/UL (ref 15–500)
EOSINOPHIL NFR BLD AUTO: 3.5 %
ERYTHROCYTE [DISTWIDTH] IN BLOOD BY AUTOMATED COUNT: 12.7 % (ref 11–15)
GLOBULIN SER CALC-MCNC: 2.3 G/DL (CALC) (ref 1.9–3.7)
GLUCOSE SERPL-MCNC: 85 MG/DL (ref 65–99)
HBA1C MFR BLD: 5.5 %
HCT VFR BLD AUTO: 44.9 % (ref 35–45)
HDLC SERPL-MCNC: 62 MG/DL
HGB BLD-MCNC: 14.2 G/DL (ref 11.7–15.5)
LDLC SERPL CALC-MCNC: 56 MG/DL (CALC)
LYMPHOCYTES # BLD AUTO: 1479 CELLS/UL (ref 850–3900)
LYMPHOCYTES NFR BLD AUTO: 17 %
MAGNESIUM SERPL-MCNC: 2.2 MG/DL (ref 1.5–2.5)
MCH RBC QN AUTO: 29.1 PG (ref 27–33)
MCHC RBC AUTO-ENTMCNC: 31.6 G/DL (ref 32–36)
MCV RBC AUTO: 92 FL (ref 80–100)
MONOCYTES # BLD AUTO: 661 CELLS/UL (ref 200–950)
MONOCYTES NFR BLD AUTO: 7.6 %
NEUTROPHILS # BLD AUTO: 6177 CELLS/UL (ref 1500–7800)
NEUTROPHILS NFR BLD AUTO: 71 %
NONHDLC SERPL-MCNC: 73 MG/DL (CALC)
PLATELET # BLD AUTO: 313 THOUSAND/UL (ref 140–400)
PMV BLD REES-ECKER: 9.5 FL (ref 7.5–12.5)
POTASSIUM SERPL-SCNC: 4.4 MMOL/L (ref 3.5–5.3)
PROT SERPL-MCNC: 6 G/DL (ref 6.1–8.1)
RBC # BLD AUTO: 4.88 MILLION/UL (ref 3.8–5.1)
SODIUM SERPL-SCNC: 138 MMOL/L (ref 135–146)
TRIGL SERPL-MCNC: 85 MG/DL
TSH SERPL-ACNC: 1.7 MIU/L (ref 0.4–4.5)
WBC # BLD AUTO: 8.7 THOUSAND/UL (ref 3.8–10.8)

## 2025-08-25 ENCOUNTER — OFFICE VISIT (OUTPATIENT)
Dept: ORTHOPEDICS | Facility: CLINIC | Age: 70
End: 2025-08-25
Payer: COMMERCIAL

## 2025-08-25 DIAGNOSIS — M54.16 LUMBAR RADICULOPATHY, RIGHT: Primary | ICD-10-CM

## 2025-08-25 PROCEDURE — 99204 OFFICE O/P NEW MOD 45 MIN: CPT | Performed by: FAMILY MEDICINE

## 2025-08-25 RX ORDER — DICLOFENAC SODIUM 50 MG/1
TABLET, DELAYED RELEASE ORAL
Qty: 60 TABLET | Refills: 0 | Status: SHIPPED | OUTPATIENT
Start: 2025-08-25

## 2025-08-28 ENCOUNTER — HOSPITAL ENCOUNTER (OUTPATIENT)
Dept: RADIOLOGY | Facility: CLINIC | Age: 70
Discharge: HOME | End: 2025-08-28
Attending: FAMILY MEDICINE
Payer: COMMERCIAL

## 2025-08-28 DIAGNOSIS — M54.16 LUMBAR RADICULOPATHY, RIGHT: ICD-10-CM

## 2025-09-04 ENCOUNTER — APPOINTMENT (OUTPATIENT)
Dept: URBAN - METROPOLITAN AREA CLINIC 203 | Age: 70
Setting detail: DERMATOLOGY
End: 2025-09-04

## 2025-09-04 DIAGNOSIS — L82.1 OTHER SEBORRHEIC KERATOSIS: ICD-10-CM

## 2025-09-04 DIAGNOSIS — Z85.828 PERSONAL HISTORY OF OTHER MALIGNANT NEOPLASM OF SKIN: ICD-10-CM

## 2025-09-04 DIAGNOSIS — D22 MELANOCYTIC NEVI: ICD-10-CM

## 2025-09-04 DIAGNOSIS — L57.8 OTHER SKIN CHANGES DUE TO CHRONIC EXPOSURE TO NONIONIZING RADIATION: ICD-10-CM

## 2025-09-04 DIAGNOSIS — L81.4 OTHER MELANIN HYPERPIGMENTATION: ICD-10-CM

## 2025-09-04 DIAGNOSIS — D18.0 HEMANGIOMA: ICD-10-CM

## 2025-09-04 PROCEDURE — OTHER REASSURANCE: OTHER

## 2025-09-04 PROCEDURE — OTHER SUNSCREEN RECOMMENDATIONS: OTHER

## 2025-09-04 PROCEDURE — OTHER COUNSELING: OTHER

## 2025-09-04 ASSESSMENT — LOCATION DETAILED DESCRIPTION DERM
LOCATION DETAILED: LEFT MEDIAL SUPERIOR CHEST
LOCATION DETAILED: LEFT MEDIAL BREAST 11-12:00 REGION
LOCATION DETAILED: EPIGASTRIC SKIN
LOCATION DETAILED: LEFT SUPERIOR UPPER BACK
LOCATION DETAILED: LEFT INFERIOR UPPER BACK
LOCATION DETAILED: RIGHT RIB CAGE
LOCATION DETAILED: LEFT MEDIAL BREAST 10-11:00 REGION
LOCATION DETAILED: RIGHT SUPERIOR UPPER BACK

## 2025-09-04 ASSESSMENT — LOCATION SIMPLE DESCRIPTION DERM
LOCATION SIMPLE: ABDOMEN
LOCATION SIMPLE: LEFT UPPER BACK
LOCATION SIMPLE: RIGHT UPPER BACK
LOCATION SIMPLE: LEFT BREAST
LOCATION SIMPLE: CHEST

## 2025-09-04 ASSESSMENT — LOCATION ZONE DERM: LOCATION ZONE: TRUNK
